# Patient Record
Sex: MALE | Race: WHITE | NOT HISPANIC OR LATINO | Employment: OTHER | URBAN - NONMETROPOLITAN AREA
[De-identification: names, ages, dates, MRNs, and addresses within clinical notes are randomized per-mention and may not be internally consistent; named-entity substitution may affect disease eponyms.]

---

## 2017-01-03 ENCOUNTER — HOSPITAL ENCOUNTER (OUTPATIENT)
Dept: SPEECH THERAPY | Facility: HOSPITAL | Age: 77
Setting detail: THERAPIES SERIES
Discharge: HOME | End: 2017-01-03

## 2017-01-03 DIAGNOSIS — R13.12 OROPHARYNGEAL DYSPHAGIA: Primary | ICD-10-CM

## 2017-01-03 PROCEDURE — 92526 ORAL FUNCTION THERAPY: CPT | Performed by: SPEECH-LANGUAGE PATHOLOGIST

## 2017-01-03 NOTE — PROGRESS NOTES
Outpatient Speech Language Pathology   Adult Swallow Treatment Note   [place]     Patient Name: Elliot Palma  : 1940  MRN: [id number]  Today's Date: 1/3/2017         Visit Date: 2017   Patient Active Problem List   Diagnosis   • Metastatic squamous cell carcinoma   • Cerebrovascular accident (CVA) due to thrombosis of left middle cerebral artery   • Atrial fibrillation   • Dyslipidemia   • Paroxysmal atrial flutter        Visit Dx:    ICD-10-CM ICD-9-CM   1. Oropharyngeal dysphagia R13.12 787.22                               SLP OP Goals       17 0859 16 0915 16 0918    Subjective Comments    Subjective Comments Patient reports that he has been doing his exercises. He reports improvement in his ability to use his duck call which involves manipulation of itra-oral pressure, precise tongue movements, and blowing strength and endurance.    -KG Patient was alert and able to participate in all therapy activities. He has been practing his goals at home.   -KG     Subjective Pain    Able to rate subjective pain? no  -KG no  -KG no  -KG    Dysphagia Goals    Dysphagia LTG's Patient will safely consume the recommended diet without complications such as aspiration pneumonia  -KG Patient will safely consume the recommended diet without complications such as aspiration pneumonia  -KG Patient will safely consume the recommended diet without complications such as aspiration pneumonia  -KG    Patient will safely consume the recommended diet without complications such as aspiration pneumonia Regular diet with nectar thick liquids  -KG Regular diet with nectar thick liquids  -KG Regular diet with nectar thick liquids  -KG    Status: Patient will safely consume the recommended diet without complications such as aspiration pneumonia Progressing as expected  -KG Progressing as expected  -KG Progressing as expected  -KG    Comments: Patient will safely consume the recommended diet without complications  such as aspiration pneumonia Patient reports no problems with regular diet. Less anterior loss today.   -KG Patient reports no problems with regular diet. Some anterior loss and drooling noted during therapy today.   -KG Per [facility] repeated, pt consuming regular diet with thin liquids. Reports no overt problems, does clear throat.   -KG    Dysphagia STG's Patient will improve oral skills to enhance safety and increase eating efficiency and bolus control through labial seal exercises;Patient will improve oral skills to enhance safety and increase eating efficiency by increasing accuracy of back of tongue control;Patient will increase laryngeal elevation to reduce residue that might fall into airway by completing;Patient will increase closure of larynx to keep food from falling into the airway by completing;Patient will increase strength of tongue base and posterior pharyngeal walls to reduce residue that might fall into airway by completing  -KG Patient will improve oral skills to enhance safety and increase eating efficiency and bolus control through labial seal exercises;Patient will improve oral skills to enhance safety and increase eating efficiency by increasing accuracy of back of tongue control;Patient will increase laryngeal elevation to reduce residue that might fall into airway by completing;Patient will increase closure of larynx to keep food from falling into the airway by completing;Patient will increase strength of tongue base and posterior pharyngeal walls to reduce residue that might fall into airway by completing  -KG Patient will improve oral skills to enhance safety and increase eating efficiency and bolus control through labial seal exercises;Patient will improve oral skills to enhance safety and increase eating efficiency by increasing accuracy of back of tongue control;Patient will increase laryngeal elevation to reduce residue that might fall into airway by completing;Patient will increase  closure of larynx to keep food from falling into the airway by completing;Patient will increase strength of tongue base and posterior pharyngeal walls to reduce residue that might fall into airway by completing  -KG    Patient will improve oral skills to enhance safety and increase eating efficiency and bolus control through labial seal exercises with cues  -KG with cues  -KG with cues  -KG    Status: Patient will improve oral skills to enhance safety and increase eating efficiency and bolus control through labial seal exercises Progressing as expected  -KG Progressing as expected  -KG Progressing as expected  -KG    Comments: Patient will improve oral skills to enhance safety and increase eating efficiency and bolus control through labial seal exercises Able to complete exercises with minimal cues.   -KG Able to complete exercises with instruction and cues.   -KG Able to complete exercises with instruction and cues.   -KG    Patient will improve oral skills to enhance safety and increase eating efficiency by increasing accuracy of back of tongue control with cues  -KG with cues  -KG with cues  -KG    Status: Patient will improve oral skills to enhance safety and increase eating efficiency by increasing accuracy of back of tongue control Progressing as expected  -KG Progressing as expected  -KG Progressing as expected  -KG    Comments: Patient will improve oral skills to enhance safety and increase eating efficiency by increasing accuracy of back of tongue control Able to complete exercises with minimal cues.   -KG Able to complete exercises with instruction and cues.   -KG Able to complete exercises with instruction and cues.   -KG    Patient will increase laryngeal elevation to reduce residue that might fall into airway by completing Mendelsohn maneuver;falsetto/pitch glide;with cues  -KG Mendelsohn maneuver;falsetto/pitch glide;with cues  -KG Mendelsohn maneuver;falsetto/pitch glide;with cues  -KG    Status:  Patient will increase laryngeal elevation to reduce residue that might fall into airway by completing Progressing as expected  -KG Progressing as expected  -KG Progressing as expected  -KG    Comments: Patient will increase laryngeal elevation to reduce residue that might fall into airway by completing Able to complete exercises with minimal cues.   -KG Able to complete exercises with instruction and cues.   -KG Able to complete exercises with instruction and cues.   -KG    Patient will increase closure of larynx to keep food from falling into the airway by completing super-supraglottic swallow;push-pull with breath hold;with cues  -KG super-supraglottic swallow;push-pull with breath hold;with cues  -KG super-supraglottic swallow;push-pull with breath hold;with cues  -KG    Status: Patient will increase closure of larynx to keep food from falling into the airway by completing Progressing as expected  -KG Progressing as expected  -KG Progressing as expected  -KG    Comments: Patient will increase closure of larynx to keep food from falling into the airway by completing able to complete with minimal cues.  -KG able to complete with instruction and cues.  -KG able to complete with instruction and cues.  -KG    Patient will increase strength of tongue base and posterior pharyngeal walls to reduce residue that might fall into airway by completing effotful swallow;Carly (tongue hold);with cues  -KG effotful swallow;Carly (tongue hold);with cues  -KG effotful swallow;Acrly (tongue hold);with cues  -KG    Status: Patient will increase strength of tongue base and posterior pharyngeal walls to reduce residue that might fall into airway by completing Progressing as expected  -KG Progressing as expected  -KG Progressing as expected  -KG    Comments: Patient will increase strength of tongue base and posterior pharyngeal walls to reduce residue that might fall into airway by completing Able to complete exercises with minimal  cues.   -KG Able to complete exercises with instruction and cues.   -KG Able to complete exercises with instruction and cues.   -KG    SLP Time Calculation    SLP Goal Re-Cert Due Date 01/06/17  -KG 01/06/17  -KG 01/06/17  -KG      User Key  (r) = Recorded By, (t) = Taken By, (c) = Cosigned By    Initials Name Provider Type    [facility]WOT [facility] Speech and Language Pathologist                OP SLP Education       01/03/17 0908          Education    Barriers to Learning Hearing deficit  -KG      Action Taken to Address Barriers Increased volume and repeated when necessary.   -KG      Education Provided Patient demonstrated recommended strategies  -KG      Assessed Learning needs;Learning motivation;Learning preferences;Learning readiness  -KG      Learning Motivation Strong  -KG      Learning Method Explanation;Demonstration  -KG      Teaching Response Verbalized understanding;Demonstrated understanding  -KG        User Key  (r) = Recorded By, (t) = Taken By, (c) = Cosigned By    Initials Name Effective Dates    KG HIWOT Jacome 08/02/16 -                 OP SLP Assessment/Plan - 01/03/17 0906     SLP Assessment    Clinical Impression Comments Patient performing exercieses with minimal cues. Swallow improving. Oral skills improving.   -KG    SLP Plan    Plan Comments Continue therapy  -KG      User Key  (r) = Recorded By, (t) = Taken By, (c) = Cosigned By    Initials Name Provider Type    CHAD Hurtado CCC-SLP Speech and Language Pathologist                Time Calculation:   SLP Start Time: 0845  SLP Stop Time: 0930  SLP Time Calculation (min): 45 min    Therapy Charges for Today     Code Description Service Date Service Provider Modifiers Qty    [id number] HC ST TREATMENT SWALLOW 3 1/3/2017 HIWOT Jacome GN 1                   HIWOT Mcneill  1/3/2017

## 2017-01-05 ENCOUNTER — HOSPITAL ENCOUNTER (OUTPATIENT)
Dept: SPEECH THERAPY | Facility: HOSPITAL | Age: 77
Setting detail: THERAPIES SERIES
Discharge: HOME | End: 2017-01-05

## 2017-01-05 DIAGNOSIS — R13.12 OROPHARYNGEAL DYSPHAGIA: Primary | ICD-10-CM

## 2017-01-05 PROCEDURE — G8996 SWALLOW CURRENT STATUS: HCPCS | Performed by: SPEECH-LANGUAGE PATHOLOGIST

## 2017-01-05 PROCEDURE — 92526 ORAL FUNCTION THERAPY: CPT | Performed by: SPEECH-LANGUAGE PATHOLOGIST

## 2017-01-05 PROCEDURE — G8997 SWALLOW GOAL STATUS: HCPCS | Performed by: SPEECH-LANGUAGE PATHOLOGIST

## 2017-01-05 NOTE — PROGRESS NOTES
Outpatient Speech Language Pathology   Adult Swallow Progress Note   [place]     Patient Name: Elliot Palma  : 1940  MRN: [id number]  Today's Date: 2017         Visit Date: 2017   Patient Active Problem List   Diagnosis   • Metastatic squamous cell carcinoma   • Cerebrovascular accident (CVA) due to thrombosis of left middle cerebral artery   • Atrial fibrillation   • Dyslipidemia   • Paroxysmal atrial flutter        Visit Dx:    ICD-10-CM ICD-9-CM   1. Oropharyngeal dysphagia R13.12 787.22                               SLP OP Goals       17 0856 17 0859 16 0915    Goal Type Needed    Goal Type Needed Other Adult Goals  -KG      Subjective Comments    Subjective Comments Patient alert and able to participate. Tolerated estim at level 15.5 through swallow exercises.   -KG Patient reports that he has been doing his exercises. He reports improvement in his ability to use his duck call which involves manipulation of itra-oral pressure, precise tongue movements, and blowing strength and endurance.    -KG Patient was alert and able to participate in all therapy activities. He has been practing his goals at home.   -KG    Subjective Pain    Able to rate subjective pain? no  -KG no  -KG no  -KG    Dysphagia Goals    Dysphagia LTG's Patient will safely consume the recommended diet without complications such as aspiration pneumonia  -KG Patient will safely consume the recommended diet without complications such as aspiration pneumonia  -KG Patient will safely consume the recommended diet without complications such as aspiration pneumonia  -KG    Patient will safely consume the recommended diet without complications such as aspiration pneumonia Regular diet with nectar thick liquids  -KG Regular diet with nectar thick liquids  -KG Regular diet with nectar thick liquids  -KG    Status: Patient will safely consume the recommended diet without complications such as aspiration pneumonia  Progressing as expected  -KG Progressing as expected  -KG Progressing as expected  -KG    Comments: Patient will safely consume the recommended diet without complications such as aspiration pneumonia Patient reports no problems with regular diet. Continues to clear throat with and without drinking thin water.   -KG Patient reports no problems with regular diet. Less anterior loss today.   -KG Patient reports no problems with regular diet. Some anterior loss and drooling noted during therapy today.   -KG    Dysphagia STG's Patient will improve oral skills to enhance safety and increase eating efficiency and bolus control through labial seal exercises;Patient will improve oral skills to enhance safety and increase eating efficiency by increasing accuracy of back of tongue control;Patient will increase laryngeal elevation to reduce residue that might fall into airway by completing;Patient will increase closure of larynx to keep food from falling into the airway by completing;Patient will increase strength of tongue base and posterior pharyngeal walls to reduce residue that might fall into airway by completing  -KG Patient will improve oral skills to enhance safety and increase eating efficiency and bolus control through labial seal exercises;Patient will improve oral skills to enhance safety and increase eating efficiency by increasing accuracy of back of tongue control;Patient will increase laryngeal elevation to reduce residue that might fall into airway by completing;Patient will increase closure of larynx to keep food from falling into the airway by completing;Patient will increase strength of tongue base and posterior pharyngeal walls to reduce residue that might fall into airway by completing  -KG Patient will improve oral skills to enhance safety and increase eating efficiency and bolus control through labial seal exercises;Patient will improve oral skills to enhance safety and increase eating efficiency by  increasing accuracy of back of tongue control;Patient will increase laryngeal elevation to reduce residue that might fall into airway by completing;Patient will increase closure of larynx to keep food from falling into the airway by completing;Patient will increase strength of tongue base and posterior pharyngeal walls to reduce residue that might fall into airway by completing  -KG    Patient will improve oral skills to enhance safety and increase eating efficiency and bolus control through labial seal exercises with cues;without cues  -KG with cues  -KG with cues  -KG    Status: Patient will improve oral skills to enhance safety and increase eating efficiency and bolus control through labial seal exercises Progressing as expected  -KG Progressing as expected  -KG Progressing as expected  -KG    Comments: Patient will improve oral skills to enhance safety and increase eating efficiency and bolus control through labial seal exercises Able to complete exercises with minimal cues.   -KG Able to complete exercises with minimal cues.   -KG Able to complete exercises with instruction and cues.   -KG    Patient will improve oral skills to enhance safety and increase eating efficiency by increasing accuracy of back of tongue control without cues  -KG with cues  -KG with cues  -KG    Status: Patient will improve oral skills to enhance safety and increase eating efficiency by increasing accuracy of back of tongue control Progressing as expected  -KG Progressing as expected  -KG Progressing as expected  -KG    Comments: Patient will improve oral skills to enhance safety and increase eating efficiency by increasing accuracy of back of tongue control Able to complete exercises with minimal cues.   -KG Able to complete exercises with minimal cues.   -KG Able to complete exercises with instruction and cues.   -KG    Patient will increase laryngeal elevation to reduce residue that might fall into airway by completing Mendelsohn  maneuver;falsetto/pitch glide;without cues  -KG Mendelsohn maneuver;falsetto/pitch glide;with cues  -KG Mendelsohn maneuver;falsetto/pitch glide;with cues  -KG    Status: Patient will increase laryngeal elevation to reduce residue that might fall into airway by completing Progressing as expected  -KG Progressing as expected  -KG Progressing as expected  -KG    Comments: Patient will increase laryngeal elevation to reduce residue that might fall into airway by completing Able to complete exercises with minimal cues.   -KG Able to complete exercises with minimal cues.   -KG Able to complete exercises with instruction and cues.   -KG    Patient will increase closure of larynx to keep food from falling into the airway by completing super-supraglottic swallow;push-pull with breath hold;without cues  -KG super-supraglottic swallow;push-pull with breath hold;with cues  -KG super-supraglottic swallow;push-pull with breath hold;with cues  -KG    Status: Patient will increase closure of larynx to keep food from falling into the airway by completing Progressing as expected  -KG Progressing as expected  -KG Progressing as expected  -KG    Comments: Patient will increase closure of larynx to keep food from falling into the airway by completing able to complete with minimal cues.  -KG able to complete with minimal cues.  -KG able to complete with instruction and cues.  -KG    Patient will increase strength of tongue base and posterior pharyngeal walls to reduce residue that might fall into airway by completing effotful swallow;Carly (tongue hold);without cues  -KG effotful swallow;Carly (tongue hold);with cues  -KG effotful swallow;Carly (tongue hold);with cues  -KG    Status: Patient will increase strength of tongue base and posterior pharyngeal walls to reduce residue that might fall into airway by completing Progressing as expected  -KG Progressing as expected  -KG Progressing as expected  -KG    Comments: Patient will  increase strength of tongue base and posterior pharyngeal walls to reduce residue that might fall into airway by completing Able to complete exercises with minimal cues.   -KG Able to complete exercises with minimal cues.   -KG Able to complete exercises with instruction and cues.   -KG    Other Goals    Other Adult Goal- 1 Patient will increase lip closure to reduce anterior loss and drooling by completing lip closure exercises.   -KG      Status: Other Adult Goal- 1 New  -KG      Other Adult Goal- 2 Patient will increase cheek strength for bolus formation by completing cheek exercises.   -KG      Status: Other Adult Goal- 2 New  -KG      SLP Time Calculation    SLP Goal Re-Cert Due Date 02/03/17  -KG 01/06/17  -KG 01/06/17  -KG      12/28/16 0918          Subjective Pain    Able to rate subjective pain? no  -KG      Dysphagia Goals    Dysphagia LTG's Patient will safely consume the recommended diet without complications such as aspiration pneumonia  -KG      Patient will safely consume the recommended diet without complications such as aspiration pneumonia Regular diet with nectar thick liquids  -KG      Status: Patient will safely consume the recommended diet without complications such as aspiration pneumonia Progressing as expected  -KG      Comments: Patient will safely consume the recommended diet without complications such as aspiration pneumonia Per [facility] repeated, pt consuming regular diet with thin liquids. Reports no overt problems, does clear throat.   -KG      Dysphagia STG's Patient will improve oral skills to enhance safety and increase eating efficiency and bolus control through labial seal exercises;Patient will improve oral skills to enhance safety and increase eating efficiency by increasing accuracy of back of tongue control;Patient will increase laryngeal elevation to reduce residue that might fall into airway by completing;Patient will increase closure of larynx to keep food from falling into  the airway by completing;Patient will increase strength of tongue base and posterior pharyngeal walls to reduce residue that might fall into airway by completing  -KG      Patient will improve oral skills to enhance safety and increase eating efficiency and bolus control through labial seal exercises with cues  -KG      Status: Patient will improve oral skills to enhance safety and increase eating efficiency and bolus control through labial seal exercises Progressing as expected  -KG      Comments: Patient will improve oral skills to enhance safety and increase eating efficiency and bolus control through labial seal exercises Able to complete exercises with instruction and cues.   -KG      Patient will improve oral skills to enhance safety and increase eating efficiency by increasing accuracy of back of tongue control with cues  -KG      Status: Patient will improve oral skills to enhance safety and increase eating efficiency by increasing accuracy of back of tongue control Progressing as expected  -KG      Comments: Patient will improve oral skills to enhance safety and increase eating efficiency by increasing accuracy of back of tongue control Able to complete exercises with instruction and cues.   -KG      Patient will increase laryngeal elevation to reduce residue that might fall into airway by completing Mendelsohn maneuver;falsetto/pitch glide;with cues  -KG      Status: Patient will increase laryngeal elevation to reduce residue that might fall into airway by completing Progressing as expected  -KG      Comments: Patient will increase laryngeal elevation to reduce residue that might fall into airway by completing Able to complete exercises with instruction and cues.   -KG      Patient will increase closure of larynx to keep food from falling into the airway by completing super-supraglottic swallow;push-pull with breath hold;with cues  -KG      Status: Patient will increase closure of larynx to keep food from  falling into the airway by completing Progressing as expected  -KG      Comments: Patient will increase closure of larynx to keep food from falling into the airway by completing able to complete with instruction and cues.  -KG      Patient will increase strength of tongue base and posterior pharyngeal walls to reduce residue that might fall into airway by completing effotful swallow;Carly (tongue hold);with cues  -KG      Status: Patient will increase strength of tongue base and posterior pharyngeal walls to reduce residue that might fall into airway by completing Progressing as expected  -KG      Comments: Patient will increase strength of tongue base and posterior pharyngeal walls to reduce residue that might fall into airway by completing Able to complete exercises with instruction and cues.   -KG      SLP Time Calculation    SLP Goal Re-Cert Due Date 01/06/17  -KG        User Key  (r) = Recorded By, (t) = Taken By, (c) = Cosigned By    Initials Name Provider Type    CHAD Hurtado CCC-SLP Speech and Language Pathologist                OP SLP Education       01/05/17 0919 01/03/17 0908       Education    Barriers to Learning Hearing deficit  -KG Hearing deficit  -KG     Action Taken to Address Barriers Paitent demonstrated understanding.   -KG Increased volume and repeated when necessary.   -KG     Education Provided Patient demonstrated recommended strategies  -KG Patient demonstrated recommended strategies  -KG     Assessed Learning needs;Learning motivation;Learning preferences;Learning readiness  -KG Learning needs;Learning motivation;Learning preferences;Learning readiness  -KG     Learning Motivation Strong  -KG Strong  -KG     Learning Method Explanation;Demonstration  -KG Explanation;Demonstration  -KG     Teaching Response Verbalized understanding;Demonstrated understanding  -KG Verbalized understanding;Demonstrated understanding  -KG       User Key  (r) = Recorded By, (t) = Taken By, (c) =  Cosigned By    Initials Name Effective Dates    KG HIWOT Jacome 08/02/16 -                 OP SLP Assessment/Plan - 01/05/17 0917     SLP Assessment    Functional Problems Swallowing  -KG    Clinical Impression: Swallowing Mild:;oropharyngeal phase dysphagia  -KG    Clinical Impression Comments Patient able to complete swallow exercises. Reduced lip/cheek strength remains.   -KG    SLP Diagnosis Oralpharyngeal dysphagia  -KG    Prognosis Good (comment)  -KG    Patient/caregiver participated in establishment of treatment plan and goals Yes  -KG    Patient would benefit from skilled therapy intervention Yes  -KG    SLP Plan    Frequency 2x/ week  -KG    Duration 4 weeks  -KG    Planned CPT's? SLP SWALLOW THERAPY: 69167  -KG    Expected Duration Therapy Session (min) 15-30 minutes  -KG    Plan Comments continue therapy  -KG      User Key  (r) = Recorded By, (t) = Taken By, (c) = Cosigned By    Initials Name Provider Type    KG HIWOT Jacome Speech and Language Pathologist                Time Calculation:   SLP Start Time: 0845  SLP Stop Time: 0928  SLP Time Calculation (min): 43 min    Therapy Charges for Today     Code Description Service Date Service Provider Modifiers Qty    30846534823 HC ST SWALLOWING CURRENT STATUS 1/5/2017 HIWOT Jacome GN, CI 1    [id number] HC ST SWALLOWING PROJECTED 1/5/2017 HIWOT Jacome,  1    [id number] HC ST TREATMENT SWALLOW 3 1/5/2017 HIWOT Jacome 1          SLP G-Codes  SLP NOMS Used?: Yes  Functional Limitations: Swallowing  Swallow Current Status (): At least 1 percent but less than 20 percent impaired, limited or restricted  Swallow Goal Status (): 0 percent impaired, limited or restricted        HIWOT Mcneill  1/5/2017

## 2017-01-10 ENCOUNTER — HOSPITAL ENCOUNTER (OUTPATIENT)
Dept: SPEECH THERAPY | Facility: HOSPITAL | Age: 77
Setting detail: THERAPIES SERIES
Discharge: HOME | End: 2017-01-10

## 2017-01-10 DIAGNOSIS — R13.12 OROPHARYNGEAL DYSPHAGIA: Primary | ICD-10-CM

## 2017-01-10 PROCEDURE — 92526 ORAL FUNCTION THERAPY: CPT | Performed by: SPEECH-LANGUAGE PATHOLOGIST

## 2017-01-10 NOTE — PROGRESS NOTES
Outpatient Speech Language Pathology   Adult Swallow Treatment Note   [place]     Patient Name: Elliot Palma  : 1940  MRN: [id number]  Today's Date: 1/10/2017         Visit Date: 01/10/2017   Patient Active Problem List   Diagnosis   • Metastatic squamous cell carcinoma   • Cerebrovascular accident (CVA) due to thrombosis of left middle cerebral artery   • Atrial fibrillation   • Dyslipidemia   • Paroxysmal atrial flutter        Visit Dx:    ICD-10-CM ICD-9-CM   1. Oropharyngeal dysphagia R13.12 787.22                               SLP OP Goals       01/10/17 0900 17 [id number]    Goal Type Needed    Goal Type Needed  Other Adult Goals  -KG     Subjective Comments    Subjective Comments Patient continues to complete swallowing exercises at home.   -KG Patient alert and able to participate. Tolerated estim at level 15.5 through swallow exercises.   -KG Patient reports that he has been doing his exercises. He reports improvement in his ability to use his duck call which involves manipulation of itra-oral pressure, precise tongue movements, and blowing strength and endurance.    -KG    Subjective Pain    Able to rate subjective pain? no  -KG no  -KG no  -KG    Dysphagia Goals    Dysphagia LTG's Patient will safely consume the recommended diet without complications such as aspiration pneumonia  -KG Patient will safely consume the recommended diet without complications such as aspiration pneumonia  -KG Patient will safely consume the recommended diet without complications such as aspiration pneumonia  -KG    Patient will safely consume the recommended diet without complications such as aspiration pneumonia Regular diet with nectar thick liquids  -KG Regular diet with nectar thick liquids  -KG Regular diet with nectar thick liquids  -KG    Status: Patient will safely consume the recommended diet without complications such as aspiration pneumonia Progressing as expected  -KG Progressing as expected   -KG Progressing as expected  -KG    Comments: Patient will safely consume the recommended diet without complications such as aspiration pneumonia Patient reports no problems with regular diet. Continues to clear throat.  -KG Patient reports no problems with regular diet. Continues to clear throat with and without drinking thin water.   -KG Patient reports no problems with regular diet. Less anterior loss today.   -KG    Dysphagia STG's Patient will improve oral skills to enhance safety and increase eating efficiency and bolus control through labial seal exercises;Patient will improve oral skills to enhance safety and increase eating efficiency by increasing accuracy of back of tongue control;Patient will increase laryngeal elevation to reduce residue that might fall into airway by completing;Patient will increase closure of larynx to keep food from falling into the airway by completing;Patient will increase strength of tongue base and posterior pharyngeal walls to reduce residue that might fall into airway by completing  -KG Patient will improve oral skills to enhance safety and increase eating efficiency and bolus control through labial seal exercises;Patient will improve oral skills to enhance safety and increase eating efficiency by increasing accuracy of back of tongue control;Patient will increase laryngeal elevation to reduce residue that might fall into airway by completing;Patient will increase closure of larynx to keep food from falling into the airway by completing;Patient will increase strength of tongue base and posterior pharyngeal walls to reduce residue that might fall into airway by completing  -KG Patient will improve oral skills to enhance safety and increase eating efficiency and bolus control through labial seal exercises;Patient will improve oral skills to enhance safety and increase eating efficiency by increasing accuracy of back of tongue control;Patient will increase laryngeal elevation to  reduce residue that might fall into airway by completing;Patient will increase closure of larynx to keep food from falling into the airway by completing;Patient will increase strength of tongue base and posterior pharyngeal walls to reduce residue that might fall into airway by completing  -KG    Patient will improve oral skills to enhance safety and increase eating efficiency and bolus control through labial seal exercises with cues;without cues  -KG with cues;without cues  -KG with cues  -KG    Status: Patient will improve oral skills to enhance safety and increase eating efficiency and bolus control through labial seal exercises Progressing as expected  -KG Progressing as expected  -KG Progressing as expected  -KG    Comments: Patient will improve oral skills to enhance safety and increase eating efficiency and bolus control through labial seal exercises Able to complete exercises with estim on level 13.   -KG Able to complete exercises with minimal cues.   -KG Able to complete exercises with minimal cues.   -KG    Patient will improve oral skills to enhance safety and increase eating efficiency by increasing accuracy of back of tongue control without cues  -KG without cues  -KG with cues  -KG    Status: Patient will improve oral skills to enhance safety and increase eating efficiency by increasing accuracy of back of tongue control Progressing as expected  -KG Progressing as expected  -KG Progressing as expected  -KG    Comments: Patient will improve oral skills to enhance safety and increase eating efficiency by increasing accuracy of back of tongue control Completing exercises at home.   -KG Able to complete exercises with minimal cues.   -KG Able to complete exercises with minimal cues.   -KG    Patient will increase laryngeal elevation to reduce residue that might fall into airway by completing Mendelsohn maneuver;falsetto/pitch glide;without cues  -KG Mendelsohn maneuver;falsetto/pitch glide;without cues   -KG Mendelsohn maneuver;falsetto/pitch glide;with cues  -KG    Status: Patient will increase laryngeal elevation to reduce residue that might fall into airway by completing Progressing as expected  -KG Progressing as expected  -KG Progressing as expected  -KG    Comments: Patient will increase laryngeal elevation to reduce residue that might fall into airway by completing completing swallow exercises at home.   -KG Able to complete exercises with minimal cues.   -KG Able to complete exercises with minimal cues.   -KG    Patient will increase closure of larynx to keep food from falling into the airway by completing super-supraglottic swallow;push-pull with breath hold;without cues  -KG super-supraglottic swallow;push-pull with breath hold;without cues  -KG super-supraglottic swallow;push-pull with breath hold;with cues  -KG    Status: Patient will increase closure of larynx to keep food from falling into the airway by completing Progressing as expected  -KG Progressing as expected  -KG Progressing as expected  -KG    Comments: Patient will increase closure of larynx to keep food from falling into the airway by completing completing swallow exercises at home.   -KG able to complete with minimal cues.  -KG able to complete with minimal cues.  -KG    Patient will increase strength of tongue base and posterior pharyngeal walls to reduce residue that might fall into airway by completing effotful swallow;Carly (tongue hold);without cues  -KG effotful swallow;Carly (tongue hold);without cues  -KG effotful swallow;Caryl (tongue hold);with cues  -KG    Status: Patient will increase strength of tongue base and posterior pharyngeal walls to reduce residue that might fall into airway by completing Progressing as expected  -KG Progressing as expected  -KG Progressing as expected  -KG    Comments: Patient will increase strength of tongue base and posterior pharyngeal walls to reduce residue that might fall into airway by  completing completing swallow exercises at home.   -KG Able to complete exercises with minimal cues.   -KG Able to complete exercises with minimal cues.   -KG    Other Goals    Other Adult Goal- 1 Patient will increase lip closure to reduce anterior loss and drooling by completing lip closure exercises.   -KG Patient will increase lip closure to reduce anterior loss and drooling by completing lip closure exercises.   -KG     Status: Other Adult Goal- 1 Progressing as expected  -KG New  -KG     Comments: Other Adult Goal- 1 Able to complete with estim on  -KG      Other Adult Goal- 2 Patient will increase cheek strength for bolus formation by completing cheek exercises.   -KG Patient will increase cheek strength for bolus formation by completing cheek exercises.   -KG     Status: Other Adult Goal- 2 Progressing as expected  -KG New  -KG     Comments: Other Adult Goal- 2 completing swallow exercises at home.   -KG      SLP Time Calculation    SLP Goal Re-Cert Due Date 02/03/17  -KG 02/03/17  -KG 01/06/17  -KG      12/29/16 0915 12/28/16 0918       Subjective Comments    Subjective Comments Patient was alert and able to participate in all therapy activities. He has been practing his goals at home.   -KG      Subjective Pain    Able to rate subjective pain? no  -KG no  -KG     Dysphagia Goals    Dysphagia LTG's Patient will safely consume the recommended diet without complications such as aspiration pneumonia  -KG Patient will safely consume the recommended diet without complications such as aspiration pneumonia  -KG     Patient will safely consume the recommended diet without complications such as aspiration pneumonia Regular diet with nectar thick liquids  -KG Regular diet with nectar thick liquids  -KG     Status: Patient will safely consume the recommended diet without complications such as aspiration pneumonia Progressing as expected  -KG Progressing as expected  -KG     Comments: Patient will safely consume the  recommended diet without complications such as aspiration pneumonia Patient reports no problems with regular diet. Some anterior loss and drooling noted during therapy today.   -KG Per [facility] repeated, pt consuming regular diet with thin liquids. Reports no overt problems, does clear throat.   -KG     Dysphagia STG's Patient will improve oral skills to enhance safety and increase eating efficiency and bolus control through labial seal exercises;Patient will improve oral skills to enhance safety and increase eating efficiency by increasing accuracy of back of tongue control;Patient will increase laryngeal elevation to reduce residue that might fall into airway by completing;Patient will increase closure of larynx to keep food from falling into the airway by completing;Patient will increase strength of tongue base and posterior pharyngeal walls to reduce residue that might fall into airway by completing  -KG Patient will improve oral skills to enhance safety and increase eating efficiency and bolus control through labial seal exercises;Patient will improve oral skills to enhance safety and increase eating efficiency by increasing accuracy of back of tongue control;Patient will increase laryngeal elevation to reduce residue that might fall into airway by completing;Patient will increase closure of larynx to keep food from falling into the airway by completing;Patient will increase strength of tongue base and posterior pharyngeal walls to reduce residue that might fall into airway by completing  -KG     Patient will improve oral skills to enhance safety and increase eating efficiency and bolus control through labial seal exercises with cues  -KG with cues  -KG     Status: Patient will improve oral skills to enhance safety and increase eating efficiency and bolus control through labial seal exercises Progressing as expected  -KG Progressing as expected  -KG     Comments: Patient will improve oral skills to enhance safety  and increase eating efficiency and bolus control through labial seal exercises Able to complete exercises with instruction and cues.   -KG Able to complete exercises with instruction and cues.   -KG     Patient will improve oral skills to enhance safety and increase eating efficiency by increasing accuracy of back of tongue control with cues  -KG with cues  -KG     Status: Patient will improve oral skills to enhance safety and increase eating efficiency by increasing accuracy of back of tongue control Progressing as expected  -KG Progressing as expected  -KG     Comments: Patient will improve oral skills to enhance safety and increase eating efficiency by increasing accuracy of back of tongue control Able to complete exercises with instruction and cues.   -KG Able to complete exercises with instruction and cues.   -KG     Patient will increase laryngeal elevation to reduce residue that might fall into airway by completing Mendelsohn maneuver;falsetto/pitch glide;with cues  -KG Mendelsohn maneuver;falsetto/pitch glide;with cues  -KG     Status: Patient will increase laryngeal elevation to reduce residue that might fall into airway by completing Progressing as expected  -KG Progressing as expected  -KG     Comments: Patient will increase laryngeal elevation to reduce residue that might fall into airway by completing Able to complete exercises with instruction and cues.   -KG Able to complete exercises with instruction and cues.   -KG     Patient will increase closure of larynx to keep food from falling into the airway by completing super-supraglottic swallow;push-pull with breath hold;with cues  -KG super-supraglottic swallow;push-pull with breath hold;with cues  -KG     Status: Patient will increase closure of larynx to keep food from falling into the airway by completing Progressing as expected  -KG Progressing as expected  -KG     Comments: Patient will increase closure of larynx to keep food from falling into the  airway by completing able to complete with instruction and cues.  -KG able to complete with instruction and cues.  -KG     Patient will increase strength of tongue base and posterior pharyngeal walls to reduce residue that might fall into airway by completing effotful swallow;Carly (tongue hold);with cues  -KG effotful swallow;Carly (tongue hold);with cues  -KG     Status: Patient will increase strength of tongue base and posterior pharyngeal walls to reduce residue that might fall into airway by completing Progressing as expected  -KG Progressing as expected  -KG     Comments: Patient will increase strength of tongue base and posterior pharyngeal walls to reduce residue that might fall into airway by completing Able to complete exercises with instruction and cues.   -KG Able to complete exercises with instruction and cues.   -KG     SLP Time Calculation    SLP Goal Re-Cert Due Date 01/06/17  -KG 01/06/17  -KG       User Key  (r) = Recorded By, (t) = Taken By, (c) = Cosigned By    Initials Name Provider Type    KG Waleska Hurtado CCC-SLP Speech and Language Pathologist                OP SLP Education       01/10/17 0919          Education    Barriers to Learning Hearing deficit  -KG      Action Taken to Address Barriers Pt demonstrated understanding  -KG      Education Provided Patient demonstrated recommended strategies  -KG      Assessed Learning needs;Learning motivation;Learning preferences;Learning readiness  -KG      Learning Motivation Strong  -KG      Learning Method Explanation;Demonstration  -KG      Teaching Response Verbalized understanding;Demonstrated understanding  -KG        User Key  (r) = Recorded By, (t) = Taken By, (c) = Cosigned By    Initials Name Effective Dates    KG Waleska Hurtado CCC-SLP 08/02/16 -                 OP SLP Assessment/Plan - 01/10/17 0918     SLP Assessment    Clinical Impression Comments Continuing to progress.   -KG    SLP Plan    Plan Comments continue therapy  -KG       User Key  (r) = Recorded By, (t) = Taken By, (c) = Cosigned By    Initials Name Provider Type    KG Waleska Hurtado CCC-SLP Speech and Language Pathologist                Time Calculation:   SLP Start Time: 0850  SLP Stop Time: 0930  SLP Time Calculation (min): 40 min    Therapy Charges for Today     Code Description Service Date Service Provider Modifiers Qty    [id number] HC ST TREATMENT SWALLOW 3 1/10/2017 Waleska Hurtado CCC-SLP GN 1                   HIWOT Mcneill  1/10/2017

## 2017-01-12 ENCOUNTER — HOSPITAL ENCOUNTER (OUTPATIENT)
Dept: SPEECH THERAPY | Facility: HOSPITAL | Age: 77
Setting detail: THERAPIES SERIES
Discharge: HOME | End: 2017-01-12

## 2017-01-12 DIAGNOSIS — R13.12 OROPHARYNGEAL DYSPHAGIA: Primary | ICD-10-CM

## 2017-01-12 PROCEDURE — 92526 ORAL FUNCTION THERAPY: CPT | Performed by: SPEECH-LANGUAGE PATHOLOGIST

## 2017-01-12 NOTE — PROGRESS NOTES
Outpatient Speech Language Pathology   Adult Swallow Treatment Note   [facility]     Patient Name: Elliot Palma  : 1940  MRN: [id number]  Today's Date: 2017         Visit Date: 2017   Patient Active Problem List   Diagnosis   • Metastatic squamous cell carcinoma   • Cerebrovascular accident (CVA) due to thrombosis of left middle cerebral artery   • Atrial fibrillation   • Dyslipidemia   • Paroxysmal atrial flutter        Visit Dx:    ICD-10-CM ICD-9-CM   1. Oropharyngeal dysphagia R13.12 787.22                               SLP OP Goals       17 0900 01/10/17 0900 17 0856    Goal Type Needed    Goal Type Needed   Other Adult Goals  -KG    Subjective Comments    Subjective Comments Patient was alert and able to complete all exercies. He reported no problems swallowing and is with completing swallow exercises at home.  -KG Patient continues to complete swallowing exercises at home.   -KG Patient alert and able to participate. Tolerated estim at level 15.5 through swallow exercises.   -KG    Subjective Pain    Able to rate subjective pain? no  -KG no  -KG no  -KG    Dysphagia Goals    Dysphagia LTG's Patient will safely consume the recommended diet without complications such as aspiration pneumonia  -KG Patient will safely consume the recommended diet without complications such as aspiration pneumonia  -KG Patient will safely consume the recommended diet without complications such as aspiration pneumonia  -KG    Patient will safely consume the recommended diet without complications such as aspiration pneumonia Regular diet with nectar thick liquids  -KG Regular diet with nectar thick liquids  -KG Regular diet with nectar thick liquids  -KG    Status: Patient will safely consume the recommended diet without complications such as aspiration pneumonia Progressing as expected  -KG Progressing as expected  -KG Progressing as expected  -KG    Comments: Patient will safely consume the recommended  diet without complications such as aspiration pneumonia Patient reports no problems with regular diet. Continues to clear throat.  -KG Patient reports no problems with regular diet. Continues to clear throat.  -KG Patient reports no problems with regular diet. Continues to clear throat with and without drinking thin water.   -KG    Dysphagia STG's Patient will improve oral skills to enhance safety and increase eating efficiency and bolus control through labial seal exercises;Patient will improve oral skills to enhance safety and increase eating efficiency by increasing accuracy of back of tongue control;Patient will increase laryngeal elevation to reduce residue that might fall into airway by completing;Patient will increase closure of larynx to keep food from falling into the airway by completing;Patient will increase strength of tongue base and posterior pharyngeal walls to reduce residue that might fall into airway by completing  -KG Patient will improve oral skills to enhance safety and increase eating efficiency and bolus control through labial seal exercises;Patient will improve oral skills to enhance safety and increase eating efficiency by increasing accuracy of back of tongue control;Patient will increase laryngeal elevation to reduce residue that might fall into airway by completing;Patient will increase closure of larynx to keep food from falling into the airway by completing;Patient will increase strength of tongue base and posterior pharyngeal walls to reduce residue that might fall into airway by completing  -KG Patient will improve oral skills to enhance safety and increase eating efficiency and bolus control through labial seal exercises;Patient will improve oral skills to enhance safety and increase eating efficiency by increasing accuracy of back of tongue control;Patient will increase laryngeal elevation to reduce residue that might fall into airway by completing;Patient will increase closure of  larynx to keep food from falling into the airway by completing;Patient will increase strength of tongue base and posterior pharyngeal walls to reduce residue that might fall into airway by completing  -KG    Patient will improve oral skills to enhance safety and increase eating efficiency and bolus control through labial seal exercises with cues;without cues  -KG with cues;without cues  -KG with cues;without cues  -KG    Status: Patient will improve oral skills to enhance safety and increase eating efficiency and bolus control through labial seal exercises Progressing as expected  -KG Progressing as expected  -KG Progressing as expected  -KG    Comments: Patient will improve oral skills to enhance safety and increase eating efficiency and bolus control through labial seal exercises Able to complete exercises with estim on level 16  -KG Able to complete exercises with estim on level 13.   -KG Able to complete exercises with minimal cues.   -KG    Patient will improve oral skills to enhance safety and increase eating efficiency by increasing accuracy of back of tongue control without cues  -KG without cues  -KG without cues  -KG    Status: Patient will improve oral skills to enhance safety and increase eating efficiency by increasing accuracy of back of tongue control Progressing as expected  -KG Progressing as expected  -KG Progressing as expected  -KG    Comments: Patient will improve oral skills to enhance safety and increase eating efficiency by increasing accuracy of back of tongue control Completing exercises at home.   -KG Completing exercises at home.   -KG Able to complete exercises with minimal cues.   -KG    Patient will increase laryngeal elevation to reduce residue that might fall into airway by completing Mendelsohn maneuver;falsetto/pitch glide;without cues  -KG Mendelsohn maneuver;falsetto/pitch glide;without cues  -KG Mendelsohn maneuver;falsetto/pitch glide;without cues  -KG    Status: Patient will  increase laryngeal elevation to reduce residue that might fall into airway by completing Progressing as expected  -KG Progressing as expected  -KG Progressing as expected  -KG    Comments: Patient will increase laryngeal elevation to reduce residue that might fall into airway by completing completing swallow exercises at home.   -KG completing swallow exercises at home.   -KG Able to complete exercises with minimal cues.   -KG    Patient will increase closure of larynx to keep food from falling into the airway by completing super-supraglottic swallow;push-pull with breath hold;without cues  -KG super-supraglottic swallow;push-pull with breath hold;without cues  -KG super-supraglottic swallow;push-pull with breath hold;without cues  -KG    Status: Patient will increase closure of larynx to keep food from falling into the airway by completing Progressing as expected  -KG Progressing as expected  -KG Progressing as expected  -KG    Comments: Patient will increase closure of larynx to keep food from falling into the airway by completing completing swallow exercises at home.   -KG completing swallow exercises at home.   -KG able to complete with minimal cues.  -KG    Patient will increase strength of tongue base and posterior pharyngeal walls to reduce residue that might fall into airway by completing effotful swallow;Carly (tongue hold);without cues  -KG effotful swallow;Carly (tongue hold);without cues  -KG effotful swallow;Carly (tongue hold);without cues  -KG    Status: Patient will increase strength of tongue base and posterior pharyngeal walls to reduce residue that might fall into airway by completing Progressing as expected  -KG Progressing as expected  -KG Progressing as expected  -KG    Comments: Patient will increase strength of tongue base and posterior pharyngeal walls to reduce residue that might fall into airway by completing completing swallow exercises at home.   -KG completing swallow exercises at  home.   -KG Able to complete exercises with minimal cues.   -KG    Other Goals    Other Adult Goal- 1 Patient will increase lip closure to reduce anterior loss and drooling by completing lip closure exercises.   -KG Patient will increase lip closure to reduce anterior loss and drooling by completing lip closure exercises.   -KG Patient will increase lip closure to reduce anterior loss and drooling by completing lip closure exercises.   -KG    Status: Other Adult Goal- 1 Progressing as expected  -KG Progressing as expected  -KG New  -KG    Comments: Other Adult Goal- 1 Able to complete with estim on  -KG Able to complete with estim on  -KG     Other Adult Goal- 2 Patient will increase cheek strength for bolus formation by completing cheek exercises.   -KG Patient will increase cheek strength for bolus formation by completing cheek exercises.   -KG Patient will increase cheek strength for bolus formation by completing cheek exercises.   -KG    Status: Other Adult Goal- 2 Progressing as expected  -KG Progressing as expected  -KG New  -KG    Comments: Other Adult Goal- 2 He is completing oral motor and swallow exercises at home.   -KG completing swallow exercises at home.   -KG     SLP Time Calculation    SLP Goal Re-Cert Due Date 02/03/17  -KG 02/03/17  -KG 02/03/17  -KG      01/03/17 1256          Subjective Comments    Subjective Comments Patient reports that he has been doing his exercises. He reports improvement in his ability to use his duck call which involves manipulation of itra-oral pressure, precise tongue movements, and blowing strength and endurance.    -KG      Subjective Pain    Able to rate subjective pain? no  -KG      Dysphagia Goals    Dysphagia LTG's Patient will safely consume the recommended diet without complications such as aspiration pneumonia  -KG      Patient will safely consume the recommended diet without complications such as aspiration pneumonia Regular diet with nectar thick liquids  -KG       Status: Patient will safely consume the recommended diet without complications such as aspiration pneumonia Progressing as expected  -KG      Comments: Patient will safely consume the recommended diet without complications such as aspiration pneumonia Patient reports no problems with regular diet. Less anterior loss today.   -KG      Dysphagia STG's Patient will improve oral skills to enhance safety and increase eating efficiency and bolus control through labial seal exercises;Patient will improve oral skills to enhance safety and increase eating efficiency by increasing accuracy of back of tongue control;Patient will increase laryngeal elevation to reduce residue that might fall into airway by completing;Patient will increase closure of larynx to keep food from falling into the airway by completing;Patient will increase strength of tongue base and posterior pharyngeal walls to reduce residue that might fall into airway by completing  -KG      Patient will improve oral skills to enhance safety and increase eating efficiency and bolus control through labial seal exercises with cues  -KG      Status: Patient will improve oral skills to enhance safety and increase eating efficiency and bolus control through labial seal exercises Progressing as expected  -KG      Comments: Patient will improve oral skills to enhance safety and increase eating efficiency and bolus control through labial seal exercises Able to complete exercises with minimal cues.   -KG      Patient will improve oral skills to enhance safety and increase eating efficiency by increasing accuracy of back of tongue control with cues  -KG      Status: Patient will improve oral skills to enhance safety and increase eating efficiency by increasing accuracy of back of tongue control Progressing as expected  -KG      Comments: Patient will improve oral skills to enhance safety and increase eating efficiency by increasing accuracy of back of tongue control  Able to complete exercises with minimal cues.   -KG      Patient will increase laryngeal elevation to reduce residue that might fall into airway by completing Mendelsohn maneuver;falsetto/pitch glide;with cues  -KG      Status: Patient will increase laryngeal elevation to reduce residue that might fall into airway by completing Progressing as expected  -KG      Comments: Patient will increase laryngeal elevation to reduce residue that might fall into airway by completing Able to complete exercises with minimal cues.   -KG      Patient will increase closure of larynx to keep food from falling into the airway by completing super-supraglottic swallow;push-pull with breath hold;with cues  -KG      Status: Patient will increase closure of larynx to keep food from falling into the airway by completing Progressing as expected  -KG      Comments: Patient will increase closure of larynx to keep food from falling into the airway by completing able to complete with minimal cues.  -KG      Patient will increase strength of tongue base and posterior pharyngeal walls to reduce residue that might fall into airway by completing effotful swallow;Carly (tongue hold);with cues  -KG      Status: Patient will increase strength of tongue base and posterior pharyngeal walls to reduce residue that might fall into airway by completing Progressing as expected  -KG      Comments: Patient will increase strength of tongue base and posterior pharyngeal walls to reduce residue that might fall into airway by completing Able to complete exercises with minimal cues.   -KG      SLP Time Calculation    SLP Goal Re-Cert Due Date 01/06/17  -KG        User Key  (r) = Recorded By, (t) = Taken By, (c) = Cosigned By    Initials Name Provider Type    KG Waleska Hurtado CCC-SLP Speech and Language Pathologist                OP SLP Education       01/12/17 1103 01/10/17 0919       Education    Barriers to Learning No barriers identified  -KG Hearing deficit   -KG     Action Taken to Address Barriers  Pt demonstrated understanding  -KG     Education Provided  Patient demonstrated recommended strategies  -KG     Assessed  Learning needs;Learning motivation;Learning preferences;Learning readiness  -KG     Learning Motivation  Strong  -KG     Learning Method  Explanation;Demonstration  -KG     Teaching Response  Verbalized understanding;Demonstrated understanding  -KG     Education Comments Patient demonstrated knowledge of goals and progress. Demonstrated knowledge of swallow exercises completed at home.   -KG        User Key  (r) = Recorded By, (t) = Taken By, (c) = Cosigned By    Initials Name Effective Dates    KG HIWOT Jacome 08/02/16 -                 OP SLP Assessment/Plan - 01/12/17 1103     SLP Assessment    Clinical Impression Comments Continuing to progress.   -KG    SLP Plan    Plan Comments Continue therapy  -KG      User Key  (r) = Recorded By, (t) = Taken By, (c) = Cosigned By    Initials Name Provider Type    CHAD Hurtado CCC-SLP Speech and Language Pathologist                Time Calculation:   SLP Start Time: 0845  SLP Stop Time: 0930  SLP Time Calculation (min): 45 min    Therapy Charges for Today     Code Description Service Date Service Provider Modifiers Qty    [id number] HC ST TREATMENT SWALLOW 3 1/12/2017 HIWOT Jacome GN 1                   HIWOT Mcneill  1/12/2017

## 2017-01-17 ENCOUNTER — HOSPITAL ENCOUNTER (OUTPATIENT)
Dept: SPEECH THERAPY | Facility: HOSPITAL | Age: 77
Setting detail: THERAPIES SERIES
Discharge: HOME | End: 2017-01-17

## 2017-01-17 DIAGNOSIS — R13.12 OROPHARYNGEAL DYSPHAGIA: Primary | ICD-10-CM

## 2017-01-17 PROCEDURE — 92526 ORAL FUNCTION THERAPY: CPT | Performed by: SPEECH-LANGUAGE PATHOLOGIST

## 2017-01-17 NOTE — PROGRESS NOTES
Outpatient Speech Language Pathology   Adult Swallow Treatment Note   Tutor Key     Patient Name: Elliot Palma  : 1940  MRN: [id number]  Today's Date: 2017         Visit Date: 2017   Patient Active Problem List   Diagnosis   • Metastatic squamous cell carcinoma   • Cerebrovascular accident (CVA) due to thrombosis of left middle cerebral artery   • Atrial fibrillation   • Dyslipidemia   • Paroxysmal atrial flutter        Visit Dx:    ICD-10-CM ICD-9-CM   1. Oropharyngeal dysphagia R13.12 787.22                               SLP OP Goals       1700 01/12/17 0900 01/10/17 0900    Subjective Comments    Subjective Comments Patient expressed no concerns. He was able to complete all exercises independently with estim on level 16.5  -KG Patient was alert and able to complete all exercies. He reported no problems swallowing and is with completing swallow exercises at home.  -KG Patient continues to complete swallowing exercises at home.   -KG    Subjective Pain    Able to rate subjective pain? no  -KG no  -KG no  -KG    Dysphagia Goals    Dysphagia LTG's Patient will safely consume the recommended diet without complications such as aspiration pneumonia  -KG Patient will safely consume the recommended diet without complications such as aspiration pneumonia  -KG Patient will safely consume the recommended diet without complications such as aspiration pneumonia  -KG    Patient will safely consume the recommended diet without complications such as aspiration pneumonia Regular diet with nectar thick liquids  -KG Regular diet with nectar thick liquids  -KG Regular diet with nectar thick liquids  -KG    Status: Patient will safely consume the recommended diet without complications such as aspiration pneumonia Progressing as expected  -KG Progressing as expected  -KG Progressing as expected  -KG    Comments: Patient will safely consume the recommended diet without complications such as aspiration  pneumonia Patient reports no problems with regular diet. Continues to clear throat, throat clearing has improved.   -KG Patient reports no problems with regular diet. Continues to clear throat.  -KG Patient reports no problems with regular diet. Continues to clear throat.  -KG    Dysphagia STG's Patient will improve oral skills to enhance safety and increase eating efficiency and bolus control through labial seal exercises;Patient will improve oral skills to enhance safety and increase eating efficiency by increasing accuracy of back of tongue control;Patient will increase laryngeal elevation to reduce residue that might fall into airway by completing;Patient will increase closure of larynx to keep food from falling into the airway by completing;Patient will increase strength of tongue base and posterior pharyngeal walls to reduce residue that might fall into airway by completing  -KG Patient will improve oral skills to enhance safety and increase eating efficiency and bolus control through labial seal exercises;Patient will improve oral skills to enhance safety and increase eating efficiency by increasing accuracy of back of tongue control;Patient will increase laryngeal elevation to reduce residue that might fall into airway by completing;Patient will increase closure of larynx to keep food from falling into the airway by completing;Patient will increase strength of tongue base and posterior pharyngeal walls to reduce residue that might fall into airway by completing  -KG Patient will improve oral skills to enhance safety and increase eating efficiency and bolus control through labial seal exercises;Patient will improve oral skills to enhance safety and increase eating efficiency by increasing accuracy of back of tongue control;Patient will increase laryngeal elevation to reduce residue that might fall into airway by completing;Patient will increase closure of larynx to keep food from falling into the airway by  completing;Patient will increase strength of tongue base and posterior pharyngeal walls to reduce residue that might fall into airway by completing  -KG    Patient will improve oral skills to enhance safety and increase eating efficiency and bolus control through labial seal exercises with cues;without cues  -KG with cues;without cues  -KG with cues;without cues  -KG    Status: Patient will improve oral skills to enhance safety and increase eating efficiency and bolus control through labial seal exercises Progressing as expected  -KG Progressing as expected  -KG Progressing as expected  -KG    Comments: Patient will improve oral skills to enhance safety and increase eating efficiency and bolus control through labial seal exercises Able to complete exercises with estim on level 16.5  -KG Able to complete exercises with estim on level 16  -KG Able to complete exercises with estim on level 13.   -KG    Patient will improve oral skills to enhance safety and increase eating efficiency by increasing accuracy of back of tongue control without cues  -KG without cues  -KG without cues  -KG    Status: Patient will improve oral skills to enhance safety and increase eating efficiency by increasing accuracy of back of tongue control Progressing as expected  -KG Progressing as expected  -KG Progressing as expected  -KG    Comments: Patient will improve oral skills to enhance safety and increase eating efficiency by increasing accuracy of back of tongue control Completing exercises at home.   -KG Completing exercises at home.   -KG Completing exercises at home.   -KG    Patient will increase laryngeal elevation to reduce residue that might fall into airway by completing Mendelsohn maneuver;falsetto/pitch glide;without cues  -KG Mendelsohn maneuver;falsetto/pitch glide;without cues  -KG Mendelsohn maneuver;falsetto/pitch glide;without cues  -KG    Status: Patient will increase laryngeal elevation to reduce residue that might fall  into airway by completing Progressing as expected  -KG Progressing as expected  -KG Progressing as expected  -KG    Comments: Patient will increase laryngeal elevation to reduce residue that might fall into airway by completing completing swallow exercises at home.   -KG completing swallow exercises at home.   -KG completing swallow exercises at home.   -KG    Patient will increase closure of larynx to keep food from falling into the airway by completing super-supraglottic swallow;push-pull with breath hold;without cues  -KG super-supraglottic swallow;push-pull with breath hold;without cues  -KG super-supraglottic swallow;push-pull with breath hold;without cues  -KG    Status: Patient will increase closure of larynx to keep food from falling into the airway by completing Progressing as expected  -KG Progressing as expected  -KG Progressing as expected  -KG    Comments: Patient will increase closure of larynx to keep food from falling into the airway by completing completing swallow exercises at home.   -KG completing swallow exercises at home.   -KG completing swallow exercises at home.   -KG    Patient will increase strength of tongue base and posterior pharyngeal walls to reduce residue that might fall into airway by completing effotful swallow;Carly (tongue hold);without cues  -KG effotful swallow;Carly (tongue hold);without cues  -KG effotful swallow;Carly (tongue hold);without cues  -KG    Status: Patient will increase strength of tongue base and posterior pharyngeal walls to reduce residue that might fall into airway by completing Progressing as expected  -KG Progressing as expected  -KG Progressing as expected  -KG    Comments: Patient will increase strength of tongue base and posterior pharyngeal walls to reduce residue that might fall into airway by completing completing swallow exercises at home.   -KG completing swallow exercises at home.   -KG completing swallow exercises at home.   -KG    Other Goals     Other Adult Goal- 1 Patient will increase lip closure to reduce anterior loss and drooling by completing lip closure exercises.   -KG Patient will increase lip closure to reduce anterior loss and drooling by completing lip closure exercises.   -KG Patient will increase lip closure to reduce anterior loss and drooling by completing lip closure exercises.   -KG    Status: Other Adult Goal- 1 Progressing as expected  -KG Progressing as expected  -KG Progressing as expected  -KG    Comments: Other Adult Goal- 1 Able to complete with estim on  -KG Able to complete with estim on  -KG Able to complete with estim on  -KG    Other Adult Goal- 2 Patient will increase cheek strength for bolus formation by completing cheek exercises.   -KG Patient will increase cheek strength for bolus formation by completing cheek exercises.   -KG Patient will increase cheek strength for bolus formation by completing cheek exercises.   -KG    Status: Other Adult Goal- 2 Progressing as expected  -KG Progressing as expected  -KG Progressing as expected  -KG    Comments: Other Adult Goal- 2 He is completing oral motor and swallow exercises at home.   -KG He is completing oral motor and swallow exercises at home.   -KG completing swallow exercises at home.   -KG    SLP Time Calculation    SLP Goal Re-Cert Due Date 02/03/17  -KG 02/03/17  -KG 02/03/17  -KG      01/05/17 0856          Goal Type Needed    Goal Type Needed Other Adult Goals  -KG      Subjective Comments    Subjective Comments Patient alert and able to participate. Tolerated estim at level 15.5 through swallow exercises.   -KG      Subjective Pain    Able to rate subjective pain? no  -KG      Dysphagia Goals    Dysphagia LTG's Patient will safely consume the recommended diet without complications such as aspiration pneumonia  -KG      Patient will safely consume the recommended diet without complications such as aspiration pneumonia Regular diet with nectar thick liquids  -KG       Status: Patient will safely consume the recommended diet without complications such as aspiration pneumonia Progressing as expected  -KG      Comments: Patient will safely consume the recommended diet without complications such as aspiration pneumonia Patient reports no problems with regular diet. Continues to clear throat with and without drinking thin water.   -KG      Dysphagia STG's Patient will improve oral skills to enhance safety and increase eating efficiency and bolus control through labial seal exercises;Patient will improve oral skills to enhance safety and increase eating efficiency by increasing accuracy of back of tongue control;Patient will increase laryngeal elevation to reduce residue that might fall into airway by completing;Patient will increase closure of larynx to keep food from falling into the airway by completing;Patient will increase strength of tongue base and posterior pharyngeal walls to reduce residue that might fall into airway by completing  -KG      Patient will improve oral skills to enhance safety and increase eating efficiency and bolus control through labial seal exercises with cues;without cues  -KG      Status: Patient will improve oral skills to enhance safety and increase eating efficiency and bolus control through labial seal exercises Progressing as expected  -KG      Comments: Patient will improve oral skills to enhance safety and increase eating efficiency and bolus control through labial seal exercises Able to complete exercises with minimal cues.   -KG      Patient will improve oral skills to enhance safety and increase eating efficiency by increasing accuracy of back of tongue control without cues  -KG      Status: Patient will improve oral skills to enhance safety and increase eating efficiency by increasing accuracy of back of tongue control Progressing as expected  -KG      Comments: Patient will improve oral skills to enhance safety and increase eating efficiency by  increasing accuracy of back of tongue control Able to complete exercises with minimal cues.   -KG      Patient will increase laryngeal elevation to reduce residue that might fall into airway by completing Mendelsohn maneuver;falsetto/pitch glide;without cues  -KG      Status: Patient will increase laryngeal elevation to reduce residue that might fall into airway by completing Progressing as expected  -KG      Comments: Patient will increase laryngeal elevation to reduce residue that might fall into airway by completing Able to complete exercises with minimal cues.   -KG      Patient will increase closure of larynx to keep food from falling into the airway by completing super-supraglottic swallow;push-pull with breath hold;without cues  -KG      Status: Patient will increase closure of larynx to keep food from falling into the airway by completing Progressing as expected  -KG      Comments: Patient will increase closure of larynx to keep food from falling into the airway by completing able to complete with minimal cues.  -KG      Patient will increase strength of tongue base and posterior pharyngeal walls to reduce residue that might fall into airway by completing effotful swallow;Carly (tongue hold);without cues  -KG      Status: Patient will increase strength of tongue base and posterior pharyngeal walls to reduce residue that might fall into airway by completing Progressing as expected  -KG      Comments: Patient will increase strength of tongue base and posterior pharyngeal walls to reduce residue that might fall into airway by completing Able to complete exercises with minimal cues.   -KG      Other Goals    Other Adult Goal- 1 Patient will increase lip closure to reduce anterior loss and drooling by completing lip closure exercises.   -KG      Status: Other Adult Goal- 1 New  -KG      Other Adult Goal- 2 Patient will increase cheek strength for bolus formation by completing cheek exercises.   -KG      Status:  Other Adult Goal- 2 New  -KG      SLP Time Calculation    SLP Goal Re-Cert Due Date 02/03/17  -KG        User Key  (r) = Recorded By, (t) = Taken By, (c) = Cosigned By    Initials Name Provider Type    [facility] Lawrence Speech and Language Pathologist                OP SLP Education       01/17/17 0905          Education    Barriers to Learning No barriers identified  -KG      Education Provided Patient demonstrated recommended strategies  -KG      Assessed Learning needs;Learning motivation;Learning preferences;Learning readiness  -KG      Learning Motivation Strong  -KG      Learning Method Demonstration  -KG      Teaching Response Verbalized understanding;Demonstrated understanding  -KG      Education Comments Continue therapy  -KG        User Key  (r) = Recorded By, (t) = Taken By, (c) = Cosigned By    Initials Name Effective Dates    [facility]OT Lawrence 08/02/16 -                 OP SLP Assessment/Plan - 01/17/17 0904     SLP Assessment    Clinical Impression Comments Continuing to progress, Decrease in facial asymmetry.   -KG    SLP Plan    Plan Comments Continue therapy.  -KG      User Key  (r) = Recorded By, (t) = Taken By, (c) = Cosigned By    Initials Name Provider Type    [facility] Speech and Language Pathologist                Time Calculation:   SLP Start Time: 0845  SLP Stop Time: 0925  SLP Time Calculation (min): 40 min    Therapy Charges for Today     Code Description Service Date Service Provider Modifiers Qty    [id number] HC ST TREATMENT SWALLOW 3 1/17/2017 HI[facility]OT Jacome GN 1                   HIWOT Mcneill  1/17/2017

## 2017-01-19 ENCOUNTER — HOSPITAL ENCOUNTER (OUTPATIENT)
Dept: SPEECH THERAPY | Facility: HOSPITAL | Age: 77
Setting detail: THERAPIES SERIES
Discharge: HOME | End: 2017-01-19

## 2017-01-19 DIAGNOSIS — R13.12 OROPHARYNGEAL DYSPHAGIA: Primary | ICD-10-CM

## 2017-01-19 PROCEDURE — 92526 ORAL FUNCTION THERAPY: CPT | Performed by: SPEECH-LANGUAGE PATHOLOGIST

## 2017-01-19 NOTE — PROGRESS NOTES
Outpatient Speech Language Pathology   Adult Swallow Treatment Note   Colon     Patient Name: Elliot Palma  : 1940  MRN: [id number]  Today's Date: 2017         Visit Date: 2017   Patient Active Problem List   Diagnosis   • Metastatic squamous cell carcinoma   • Cerebrovascular accident (CVA) due to thrombosis of left middle cerebral artery   • Atrial fibrillation   • Dyslipidemia   • Paroxysmal atrial flutter        Visit Dx:    ICD-10-CM ICD-9-CM   1. Oropharyngeal dysphagia R13.12 787.22                               SLP OP Goals       17 0858 17 0900 17 09    Subjective Comments    Subjective Comments Patient expressed no concerns. He feels his cheek is getting stronger. No problems with diet.   -KG Patient expressed no concerns. He was able to complete all exercises independently with estim on level 16.5  -KG Patient was alert and able to complete all exercies. He reported no problems swallowing and is with completing swallow exercises at home.  -KG    Subjective Pain    Able to rate subjective pain?  no  -KG no  -KG    Dysphagia Goals    Dysphagia LTG's Patient will safely consume the recommended diet without complications such as aspiration pneumonia  -KG Patient will safely consume the recommended diet without complications such as aspiration pneumonia  -KG Patient will safely consume the recommended diet without complications such as aspiration pneumonia  -KG    Patient will safely consume the recommended diet without complications such as aspiration pneumonia Regular diet with nectar thick liquids  -KG Regular diet with nectar thick liquids  -KG Regular diet with nectar thick liquids  -KG    Status: Patient will safely consume the recommended diet without complications such as aspiration pneumonia Progressing as expected  -KG Progressing as expected  -KG Progressing as expected  -KG    Comments: Patient will safely consume the recommended diet without complications  such as aspiration pneumonia Patient reports no problems with regular diet. Throat clearing has improved.   -KG Patient reports no problems with regular diet. Continues to clear throat, throat clearing has improved.   -KG Patient reports no problems with regular diet. Continues to clear throat.  -KG    Dysphagia STG's Patient will improve oral skills to enhance safety and increase eating efficiency and bolus control through labial seal exercises;Patient will improve oral skills to enhance safety and increase eating efficiency by increasing accuracy of back of tongue control;Patient will increase laryngeal elevation to reduce residue that might fall into airway by completing;Patient will increase closure of larynx to keep food from falling into the airway by completing;Patient will increase strength of tongue base and posterior pharyngeal walls to reduce residue that might fall into airway by completing  -KG Patient will improve oral skills to enhance safety and increase eating efficiency and bolus control through labial seal exercises;Patient will improve oral skills to enhance safety and increase eating efficiency by increasing accuracy of back of tongue control;Patient will increase laryngeal elevation to reduce residue that might fall into airway by completing;Patient will increase closure of larynx to keep food from falling into the airway by completing;Patient will increase strength of tongue base and posterior pharyngeal walls to reduce residue that might fall into airway by completing  -KG Patient will improve oral skills to enhance safety and increase eating efficiency and bolus control through labial seal exercises;Patient will improve oral skills to enhance safety and increase eating efficiency by increasing accuracy of back of tongue control;Patient will increase laryngeal elevation to reduce residue that might fall into airway by completing;Patient will increase closure of larynx to keep food from  falling into the airway by completing;Patient will increase strength of tongue base and posterior pharyngeal walls to reduce residue that might fall into airway by completing  -KG    Patient will improve oral skills to enhance safety and increase eating efficiency and bolus control through labial seal exercises with cues;without cues  -KG with cues;without cues  -KG with cues;without cues  -KG    Status: Patient will improve oral skills to enhance safety and increase eating efficiency and bolus control through labial seal exercises Progressing as expected  -KG Progressing as expected  -KG Progressing as expected  -KG    Comments: Patient will improve oral skills to enhance safety and increase eating efficiency and bolus control through labial seal exercises Able to complete exercises with estim on level 16.5  -KG Able to complete exercises with estim on level 16.5  -KG Able to complete exercises with estim on level 16  -KG    Patient will improve oral skills to enhance safety and increase eating efficiency by increasing accuracy of back of tongue control without cues  -KG without cues  -KG without cues  -KG    Status: Patient will improve oral skills to enhance safety and increase eating efficiency by increasing accuracy of back of tongue control Progressing as expected  -KG Progressing as expected  -KG Progressing as expected  -KG    Comments: Patient will improve oral skills to enhance safety and increase eating efficiency by increasing accuracy of back of tongue control Completing exercises at home.   -KG Completing exercises at home.   -KG Completing exercises at home.   -KG    Patient will increase laryngeal elevation to reduce residue that might fall into airway by completing Mendelsohn maneuver;falsetto/pitch glide;without cues  -KG Mendelsohn maneuver;falsetto/pitch glide;without cues  -KG Mendelsohn maneuver;falsetto/pitch glide;without cues  -KG    Status: Patient will increase laryngeal elevation to  reduce residue that might fall into airway by completing Progressing as expected  -KG Progressing as expected  -KG Progressing as expected  -KG    Comments: Patient will increase laryngeal elevation to reduce residue that might fall into airway by completing completing swallow exercises at home.   -KG completing swallow exercises at home.   -KG completing swallow exercises at home.   -KG    Patient will increase closure of larynx to keep food from falling into the airway by completing super-supraglottic swallow;push-pull with breath hold;without cues  -KG super-supraglottic swallow;push-pull with breath hold;without cues  -KG super-supraglottic swallow;push-pull with breath hold;without cues  -KG    Status: Patient will increase closure of larynx to keep food from falling into the airway by completing Progressing as expected  -KG Progressing as expected  -KG Progressing as expected  -KG    Comments: Patient will increase closure of larynx to keep food from falling into the airway by completing completing swallow exercises at home.   -KG completing swallow exercises at home.   -KG completing swallow exercises at home.   -KG    Patient will increase strength of tongue base and posterior pharyngeal walls to reduce residue that might fall into airway by completing effotful swallow;Carly (tongue hold);without cues  -KG effotful swallow;Carly (tongue hold);without cues  -KG effotful swallow;Carly (tongue hold);without cues  -KG    Status: Patient will increase strength of tongue base and posterior pharyngeal walls to reduce residue that might fall into airway by completing Progressing as expected  -KG Progressing as expected  -KG Progressing as expected  -KG    Comments: Patient will increase strength of tongue base and posterior pharyngeal walls to reduce residue that might fall into airway by completing completing swallow exercises at home.   -KG completing swallow exercises at home.   -KG completing swallow  exercises at home.   -KG    Other Goals    Other Adult Goal- 1 Patient will increase lip closure to reduce anterior loss and drooling by completing lip closure exercises.   -KG Patient will increase lip closure to reduce anterior loss and drooling by completing lip closure exercises.   -KG Patient will increase lip closure to reduce anterior loss and drooling by completing lip closure exercises.   -KG    Status: Other Adult Goal- 1 Progressing as expected  -KG Progressing as expected  -KG Progressing as expected  -KG    Comments: Other Adult Goal- 1 Able to complete with estim on  -KG Able to complete with estim on  -KG Able to complete with estim on  -KG    Other Adult Goal- 2 Patient will increase cheek strength for bolus formation by completing cheek exercises.   -KG Patient will increase cheek strength for bolus formation by completing cheek exercises.   -KG Patient will increase cheek strength for bolus formation by completing cheek exercises.   -KG    Status: Other Adult Goal- 2 Progressing as expected  -KG Progressing as expected  -KG Progressing as expected  -KG    Comments: Other Adult Goal- 2 He is completing oral motor and swallow exercises at home.   -KG He is completing oral motor and swallow exercises at home.   -KG He is completing oral motor and swallow exercises at home.   -KG    SLP Time Calculation    SLP Goal Re-Cert Due Date 02/23/17  -KG 02/03/17  -KG 02/03/17  -KG      01/10/17 0900          Subjective Comments    Subjective Comments Patient continues to complete swallowing exercises at home.   -KG      Subjective Pain    Able to rate subjective pain? no  -KG      Dysphagia Goals    Dysphagia LTG's Patient will safely consume the recommended diet without complications such as aspiration pneumonia  -KG      Patient will safely consume the recommended diet without complications such as aspiration pneumonia Regular diet with nectar thick liquids  -KG      Status: Patient will safely consume the  recommended diet without complications such as aspiration pneumonia Progressing as expected  -KG      Comments: Patient will safely consume the recommended diet without complications such as aspiration pneumonia Patient reports no problems with regular diet. Continues to clear throat.  -KG      Dysphagia STG's Patient will improve oral skills to enhance safety and increase eating efficiency and bolus control through labial seal exercises;Patient will improve oral skills to enhance safety and increase eating efficiency by increasing accuracy of back of tongue control;Patient will increase laryngeal elevation to reduce residue that might fall into airway by completing;Patient will increase closure of larynx to keep food from falling into the airway by completing;Patient will increase strength of tongue base and posterior pharyngeal walls to reduce residue that might fall into airway by completing  -KG      Patient will improve oral skills to enhance safety and increase eating efficiency and bolus control through labial seal exercises with cues;without cues  -KG      Status: Patient will improve oral skills to enhance safety and increase eating efficiency and bolus control through labial seal exercises Progressing as expected  -KG      Comments: Patient will improve oral skills to enhance safety and increase eating efficiency and bolus control through labial seal exercises Able to complete exercises with estim on level 13.   -KG      Patient will improve oral skills to enhance safety and increase eating efficiency by increasing accuracy of back of tongue control without cues  -KG      Status: Patient will improve oral skills to enhance safety and increase eating efficiency by increasing accuracy of back of tongue control Progressing as expected  -KG      Comments: Patient will improve oral skills to enhance safety and increase eating efficiency by increasing accuracy of back of tongue control Completing exercises at  home.   -KG      Patient will increase laryngeal elevation to reduce residue that might fall into airway by completing Mendelsohn maneuver;falsetto/pitch glide;without cues  -KG      Status: Patient will increase laryngeal elevation to reduce residue that might fall into airway by completing Progressing as expected  -KG      Comments: Patient will increase laryngeal elevation to reduce residue that might fall into airway by completing completing swallow exercises at home.   -KG      Patient will increase closure of larynx to keep food from falling into the airway by completing super-supraglottic swallow;push-pull with breath hold;without cues  -KG      Status: Patient will increase closure of larynx to keep food from falling into the airway by completing Progressing as expected  -KG      Comments: Patient will increase closure of larynx to keep food from falling into the airway by completing completing swallow exercises at home.   -KG      Patient will increase strength of tongue base and posterior pharyngeal walls to reduce residue that might fall into airway by completing effotful swallow;Carly (tongue hold);without cues  -KG      Status: Patient will increase strength of tongue base and posterior pharyngeal walls to reduce residue that might fall into airway by completing Progressing as expected  -KG      Comments: Patient will increase strength of tongue base and posterior pharyngeal walls to reduce residue that might fall into airway by completing completing swallow exercises at home.   -KG      Other Goals    Other Adult Goal- 1 Patient will increase lip closure to reduce anterior loss and drooling by completing lip closure exercises.   -KG      Status: Other Adult Goal- 1 Progressing as expected  -KG      Comments: Other Adult Goal- 1 Able to complete with estim on  -KG      Other Adult Goal- 2 Patient will increase cheek strength for bolus formation by completing cheek exercises.   -KG      Status: Other  Adult Goal- 2 Progressing as expected  -KG      Comments: Other Adult Goal- 2 completing swallow exercises at home.   -KG      SLP Time Calculation    SLP Goal Re-Cert Due Date 02/03/17  -KG        User Key  (r) = Recorded By, (t) = Taken By, (c) = Cosigned By    Initials Name Provider Type    [facility] Speech and Language Pathologist                OP SLP Education       01/19/17 0900 01/17/17 0905       Education    Barriers to Learning  No barriers identified  -KG     Education Provided  Patient demonstrated recommended strategies  -KG     Assessed  Learning needs;Learning motivation;Learning preferences;Learning readiness  -KG     Learning Motivation  Strong  -KG     Learning Method  Demonstration  -KG     Teaching Response  Verbalized understanding;Demonstrated understanding  -KG     Education Comments Patient expressed understanding of goals and progress.   -KG Continue therapy  -KG       User Key  (r) = Recorded By, (t) = Taken By, (c) = Cosigned By    Initials Name Effective Dates    KG [facility] Jacome 08/02/16 -                 OP SLP Assessment/Plan - 01/19/17 0900     SLP Assessment    Clinical Impression Comments Continuing to progress. Cheek is stronger.   -KG    SLP Plan    Plan Comments Continue therapy.   -KG      User Key  (r) = Recorded By, (t) = Taken By, (c) = Cosigned By    Initials Name Provider Type    [facility] Speech and Language Pathologist                Time Calculation:   SLP Start Time: 0845  SLP Stop Time: 0925  SLP Time Calculation (min): 40 min    Therapy Charges for Today     Code Description Service Date Service Provider Modifiers Qty    [id number] HC ST TREATMENT SWALLOW 3 1/19/2017 HI[facility]OT Jacome GN 1                   HIWOT Mcneill  1/19/2017

## 2017-01-24 ENCOUNTER — HOSPITAL ENCOUNTER (OUTPATIENT)
Dept: SPEECH THERAPY | Facility: HOSPITAL | Age: 77
Setting detail: THERAPIES SERIES
Discharge: HOME | End: 2017-01-24

## 2017-01-24 DIAGNOSIS — R13.12 OROPHARYNGEAL DYSPHAGIA: Primary | ICD-10-CM

## 2017-01-24 PROCEDURE — 92526 ORAL FUNCTION THERAPY: CPT | Performed by: SPEECH-LANGUAGE PATHOLOGIST

## 2017-01-24 NOTE — PROGRESS NOTES
Outpatient Speech Language Pathology   Adult Swallow Treatment Note   [place]     Patient Name: Elliot Palma  : 1940  MRN: [id number]  Today's Date: 2017         Visit Date: 2017   Patient Active Problem List   Diagnosis   • Metastatic squamous cell carcinoma   • Cerebrovascular accident (CVA) due to thrombosis of left middle cerebral artery   • Atrial fibrillation   • Dyslipidemia   • Paroxysmal atrial flutter        Visit Dx:    ICD-10-CM ICD-9-CM   1. Oropharyngeal dysphagia R13.12 787.22                               SLP OP Goals       17 0900 17 [id number] 17    Subjective Comments    Subjective Comments Patient was alert, he stated that he feels he is improving.   -KG Patient expressed no concerns. He feels his cheek is getting stronger. No problems with diet.   -KG Patient expressed no concerns. He was able to complete all exercises independently with estim on level 16.5  -KG    Subjective Pain    Able to rate subjective pain? no  -KG  no  -KG    Dysphagia Goals    Dysphagia LTG's Patient will safely consume the recommended diet without complications such as aspiration pneumonia  -KG Patient will safely consume the recommended diet without complications such as aspiration pneumonia  -KG Patient will safely consume the recommended diet without complications such as aspiration pneumonia  -KG    Patient will safely consume the recommended diet without complications such as aspiration pneumonia Regular diet with nectar thick liquids  -KG Regular diet with nectar thick liquids  -KG Regular diet with nectar thick liquids  -KG    Status: Patient will safely consume the recommended diet without complications such as aspiration pneumonia Progressing as expected  -KG Progressing as expected  -KG Progressing as expected  -KG    Comments: Patient will safely consume the recommended diet without complications such as aspiration pneumonia Patient reports no problems with regular diet.  Throat clearing has improved.   -KG Patient reports no problems with regular diet. Throat clearing has improved.   -KG Patient reports no problems with regular diet. Continues to clear throat, throat clearing has improved.   -KG    Dysphagia STG's Patient will improve oral skills to enhance safety and increase eating efficiency and bolus control through labial seal exercises;Patient will improve oral skills to enhance safety and increase eating efficiency by increasing accuracy of back of tongue control;Patient will increase laryngeal elevation to reduce residue that might fall into airway by completing;Patient will increase closure of larynx to keep food from falling into the airway by completing;Patient will increase strength of tongue base and posterior pharyngeal walls to reduce residue that might fall into airway by completing  -KG Patient will improve oral skills to enhance safety and increase eating efficiency and bolus control through labial seal exercises;Patient will improve oral skills to enhance safety and increase eating efficiency by increasing accuracy of back of tongue control;Patient will increase laryngeal elevation to reduce residue that might fall into airway by completing;Patient will increase closure of larynx to keep food from falling into the airway by completing;Patient will increase strength of tongue base and posterior pharyngeal walls to reduce residue that might fall into airway by completing  -KG Patient will improve oral skills to enhance safety and increase eating efficiency and bolus control through labial seal exercises;Patient will improve oral skills to enhance safety and increase eating efficiency by increasing accuracy of back of tongue control;Patient will increase laryngeal elevation to reduce residue that might fall into airway by completing;Patient will increase closure of larynx to keep food from falling into the airway by completing;Patient will increase strength of tongue  base and posterior pharyngeal walls to reduce residue that might fall into airway by completing  -KG    Patient will improve oral skills to enhance safety and increase eating efficiency and bolus control through labial seal exercises with cues;without cues  -KG with cues;without cues  -KG with cues;without cues  -KG    Status: Patient will improve oral skills to enhance safety and increase eating efficiency and bolus control through labial seal exercises Progressing as expected  -KG Progressing as expected  -KG Progressing as expected  -KG    Comments: Patient will improve oral skills to enhance safety and increase eating efficiency and bolus control through labial seal exercises Able to complete exercises with estim on level 16.5  -KG Able to complete exercises with estim on level 16.5  -KG Able to complete exercises with estim on level 16.5  -KG    Patient will improve oral skills to enhance safety and increase eating efficiency by increasing accuracy of back of tongue control without cues  -KG without cues  -KG without cues  -KG    Status: Patient will improve oral skills to enhance safety and increase eating efficiency by increasing accuracy of back of tongue control Progressing as expected  -KG Progressing as expected  -KG Progressing as expected  -KG    Comments: Patient will improve oral skills to enhance safety and increase eating efficiency by increasing accuracy of back of tongue control Completing exercises at home.   -KG Completing exercises at home.   -KG Completing exercises at home.   -KG    Patient will increase laryngeal elevation to reduce residue that might fall into airway by completing Mendelsohn maneuver;falsetto/pitch glide;without cues  -KG Mendelsohn maneuver;falsetto/pitch glide;without cues  -KG Mendelsohn maneuver;falsetto/pitch glide;without cues  -KG    Status: Patient will increase laryngeal elevation to reduce residue that might fall into airway by completing Progressing as expected   -KG Progressing as expected  -KG Progressing as expected  -KG    Comments: Patient will increase laryngeal elevation to reduce residue that might fall into airway by completing completing swallow exercises at home.   -KG completing swallow exercises at home.   -KG completing swallow exercises at home.   -KG    Patient will increase closure of larynx to keep food from falling into the airway by completing super-supraglottic swallow;push-pull with breath hold;without cues  -KG super-supraglottic swallow;push-pull with breath hold;without cues  -KG super-supraglottic swallow;push-pull with breath hold;without cues  -KG    Status: Patient will increase closure of larynx to keep food from falling into the airway by completing Progressing as expected  -KG Progressing as expected  -KG Progressing as expected  -KG    Comments: Patient will increase closure of larynx to keep food from falling into the airway by completing completing swallow exercises at home.   -KG completing swallow exercises at home.   -KG completing swallow exercises at home.   -KG    Patient will increase strength of tongue base and posterior pharyngeal walls to reduce residue that might fall into airway by completing effotful swallow;Carly (tongue hold);without cues  -KG effotful swallow;Carly (tongue hold);without cues  -KG effotful swallow;Carly (tongue hold);without cues  -KG    Status: Patient will increase strength of tongue base and posterior pharyngeal walls to reduce residue that might fall into airway by completing Progressing as expected  -KG Progressing as expected  -KG Progressing as expected  -KG    Comments: Patient will increase strength of tongue base and posterior pharyngeal walls to reduce residue that might fall into airway by completing completing swallow exercises at home.   -KG completing swallow exercises at home.   -KG completing swallow exercises at home.   -KG    Other Goals    Other Adult Goal- 1 Patient will increase lip  closure to reduce anterior loss and drooling by completing lip closure exercises.   -KG Patient will increase lip closure to reduce anterior loss and drooling by completing lip closure exercises.   -KG Patient will increase lip closure to reduce anterior loss and drooling by completing lip closure exercises.   -KG    Status: Other Adult Goal- 1 Progressing as expected  -KG Progressing as expected  -KG Progressing as expected  -KG    Comments: Other Adult Goal- 1 Able to complete with estim on. Facial symmetry is improved. Drooling has decreased.   -KG Able to complete with estim on  -KG Able to complete with estim on  -KG    Other Adult Goal- 2 Patient will increase cheek strength for bolus formation by completing cheek exercises.   -KG Patient will increase cheek strength for bolus formation by completing cheek exercises.   -KG Patient will increase cheek strength for bolus formation by completing cheek exercises.   -KG    Status: Other Adult Goal- 2 Progressing as expected  -KG Progressing as expected  -KG Progressing as expected  -KG    Comments: Other Adult Goal- 2 He is completing oral motor and swallow exercises at home. Facial symmetry is improved. No reports of chokeing.   -KG He is completing oral motor and swallow exercises at home.   -KG He is completing oral motor and swallow exercises at home.   -KG    SLP Time Calculation    SLP Goal Re-Cert Due Date 02/23/17  -KG 02/23/17  -KG 02/03/17  -KG      01/12/17 0900          Subjective Comments    Subjective Comments Patient was alert and able to complete all exercies. He reported no problems swallowing and is with completing swallow exercises at home.  -KG      Subjective Pain    Able to rate subjective pain? no  -KG      Dysphagia Goals    Dysphagia LTG's Patient will safely consume the recommended diet without complications such as aspiration pneumonia  -KG      Patient will safely consume the recommended diet without complications such as aspiration  pneumonia Regular diet with nectar thick liquids  -KG      Status: Patient will safely consume the recommended diet without complications such as aspiration pneumonia Progressing as expected  -KG      Comments: Patient will safely consume the recommended diet without complications such as aspiration pneumonia Patient reports no problems with regular diet. Continues to clear throat.  -KG      Dysphagia STG's Patient will improve oral skills to enhance safety and increase eating efficiency and bolus control through labial seal exercises;Patient will improve oral skills to enhance safety and increase eating efficiency by increasing accuracy of back of tongue control;Patient will increase laryngeal elevation to reduce residue that might fall into airway by completing;Patient will increase closure of larynx to keep food from falling into the airway by completing;Patient will increase strength of tongue base and posterior pharyngeal walls to reduce residue that might fall into airway by completing  -KG      Patient will improve oral skills to enhance safety and increase eating efficiency and bolus control through labial seal exercises with cues;without cues  -KG      Status: Patient will improve oral skills to enhance safety and increase eating efficiency and bolus control through labial seal exercises Progressing as expected  -KG      Comments: Patient will improve oral skills to enhance safety and increase eating efficiency and bolus control through labial seal exercises Able to complete exercises with estim on level 16  -KG      Patient will improve oral skills to enhance safety and increase eating efficiency by increasing accuracy of back of tongue control without cues  -KG      Status: Patient will improve oral skills to enhance safety and increase eating efficiency by increasing accuracy of back of tongue control Progressing as expected  -KG      Comments: Patient will improve oral skills to enhance safety and  increase eating efficiency by increasing accuracy of back of tongue control Completing exercises at home.   -KG      Patient will increase laryngeal elevation to reduce residue that might fall into airway by completing Mendelsohn maneuver;falsetto/pitch glide;without cues  -KG      Status: Patient will increase laryngeal elevation to reduce residue that might fall into airway by completing Progressing as expected  -KG      Comments: Patient will increase laryngeal elevation to reduce residue that might fall into airway by completing completing swallow exercises at home.   -KG      Patient will increase closure of larynx to keep food from falling into the airway by completing super-supraglottic swallow;push-pull with breath hold;without cues  -KG      Status: Patient will increase closure of larynx to keep food from falling into the airway by completing Progressing as expected  -KG      Comments: Patient will increase closure of larynx to keep food from falling into the airway by completing completing swallow exercises at home.   -KG      Patient will increase strength of tongue base and posterior pharyngeal walls to reduce residue that might fall into airway by completing effotful swallow;Carly (tongue hold);without cues  -KG      Status: Patient will increase strength of tongue base and posterior pharyngeal walls to reduce residue that might fall into airway by completing Progressing as expected  -KG      Comments: Patient will increase strength of tongue base and posterior pharyngeal walls to reduce residue that might fall into airway by completing completing swallow exercises at home.   -KG      Other Goals    Other Adult Goal- 1 Patient will increase lip closure to reduce anterior loss and drooling by completing lip closure exercises.   -KG      Status: Other Adult Goal- 1 Progressing as expected  -KG      Comments: Other Adult Goal- 1 Able to complete with estim on  -KG      Other Adult Goal- 2 Patient will  increase cheek strength for bolus formation by completing cheek exercises.   -KG      Status: Other Adult Goal- 2 Progressing as expected  -KG      Comments: Other Adult Goal- 2 He is completing oral motor and swallow exercises at home.   -KG      SLP Time Calculation    SLP Goal Re-Cert Due Date 02/03/17  -KG        User Key  (r) = Recorded By, (t) = Taken By, (c) = Cosigned By    Initials Name Provider Type    [facility] Speech and Language Pathologist                OP SLP Education       01/24/17 0905          Education    Barriers to Learning No barriers identified  -KG      Education Provided Patient demonstrated recommended strategies  -KG      Assessed Learning needs;Learning motivation;Learning preferences;Learning readiness  -KG      Learning Motivation Strong  -KG      Learning Method Explanation;Demonstration  -KG      Teaching Response Verbalized understanding;Demonstrated understanding  -KG        User Key  (r) = Recorded By, (t) = Taken By, (c) = Cosigned By    Initials Name Effective Dates    KG [facility] 08/02/16 -                 OP SLP Assessment/Plan - 01/24/17 0904     SLP Assessment    Clinical Impression Comments Continues to progress. Facial symmetry is imrproved.   -KG    SLP Plan    Plan Comments Continue therapy  -KG      User Key  (r) = Recorded By, (t) = Taken By, (c) = Cosigned By    Initials Name Provider Type    [facility] Speech and Language Pathologist                Time Calculation:   SLP Start Time: 0845  SLP Stop Time: 0925  SLP Time Calculation (min): 40 min    Therapy Charges for Today     Code Description Service Date Service Provider Modifiers Qty    [id number] HC ST TREATMENT SWALLOW 3 1/24/2017 HI[facility] Jacome GN 1                   [facility] Mcneill  1/24/2017

## 2017-01-26 ENCOUNTER — HOSPITAL ENCOUNTER (OUTPATIENT)
Dept: SPEECH THERAPY | Facility: HOSPITAL | Age: 77
Setting detail: THERAPIES SERIES
Discharge: HOME | End: 2017-01-26

## 2017-01-26 DIAGNOSIS — R13.12 OROPHARYNGEAL DYSPHAGIA: ICD-10-CM

## 2017-01-26 PROCEDURE — 92526 ORAL FUNCTION THERAPY: CPT | Performed by: SPEECH-LANGUAGE PATHOLOGIST

## 2017-01-26 NOTE — PROGRESS NOTES
Outpatient Speech Language Pathology   Adult Swallow Treatment Note   [place]     Patient Name: Elliot Palma  : 1940  MRN: [id number]  Today's Date: 2017         Visit Date: 2017   Patient Active Problem List   Diagnosis   • Metastatic squamous cell carcinoma   • Cerebrovascular accident (CVA) due to thrombosis of left middle cerebral artery   • Atrial fibrillation   • Dyslipidemia   • Paroxysmal atrial flutter        Visit Dx:    ICD-10-CM ICD-9-CM   1. Oropharyngeal dysphagia R13.12 787.22                               SLP OP Goals       17 0900 17 0900 17 [id number]    Subjective Comments    Subjective Comments Patient was alert.   -KG Patient was alert, he stated that he feels he is improving.   -KG Patient expressed no concerns. He feels his cheek is getting stronger. No problems with diet.   -KG    Subjective Pain    Able to rate subjective pain? no  -KG no  -KG     Dysphagia Goals    Dysphagia LTG's Patient will safely consume the recommended diet without complications such as aspiration pneumonia  -KG Patient will safely consume the recommended diet without complications such as aspiration pneumonia  -KG Patient will safely consume the recommended diet without complications such as aspiration pneumonia  -KG    Patient will safely consume the recommended diet without complications such as aspiration pneumonia Regular diet with nectar thick liquids  -KG Regular diet with nectar thick liquids  -KG Regular diet with nectar thick liquids  -KG    Status: Patient will safely consume the recommended diet without complications such as aspiration pneumonia Progressing as expected  -KG Progressing as expected  -KG Progressing as expected  -KG    Comments: Patient will safely consume the recommended diet without complications such as aspiration pneumonia Patient reports no problems with regular diet.   -KG Patient reports no problems with regular diet. Throat clearing has improved.   -KG  Patient reports no problems with regular diet. Throat clearing has improved.   -KG    Dysphagia STG's Patient will improve oral skills to enhance safety and increase eating efficiency and bolus control through labial seal exercises;Patient will improve oral skills to enhance safety and increase eating efficiency by increasing accuracy of back of tongue control;Patient will increase laryngeal elevation to reduce residue that might fall into airway by completing;Patient will increase closure of larynx to keep food from falling into the airway by completing;Patient will increase strength of tongue base and posterior pharyngeal walls to reduce residue that might fall into airway by completing  -KG Patient will improve oral skills to enhance safety and increase eating efficiency and bolus control through labial seal exercises;Patient will improve oral skills to enhance safety and increase eating efficiency by increasing accuracy of back of tongue control;Patient will increase laryngeal elevation to reduce residue that might fall into airway by completing;Patient will increase closure of larynx to keep food from falling into the airway by completing;Patient will increase strength of tongue base and posterior pharyngeal walls to reduce residue that might fall into airway by completing  -KG Patient will improve oral skills to enhance safety and increase eating efficiency and bolus control through labial seal exercises;Patient will improve oral skills to enhance safety and increase eating efficiency by increasing accuracy of back of tongue control;Patient will increase laryngeal elevation to reduce residue that might fall into airway by completing;Patient will increase closure of larynx to keep food from falling into the airway by completing;Patient will increase strength of tongue base and posterior pharyngeal walls to reduce residue that might fall into airway by completing  -KG    Patient will improve oral skills to  enhance safety and increase eating efficiency and bolus control through labial seal exercises with cues;without cues  -KG with cues;without cues  -KG with cues;without cues  -KG    Status: Patient will improve oral skills to enhance safety and increase eating efficiency and bolus control through labial seal exercises Progressing as expected  -KG Progressing as expected  -KG Progressing as expected  -KG    Comments: Patient will improve oral skills to enhance safety and increase eating efficiency and bolus control through labial seal exercises Able to complete exercises with estim on level 16.5  -KG Able to complete exercises with estim on level 16.5  -KG Able to complete exercises with estim on level 16.5  -KG    Patient will improve oral skills to enhance safety and increase eating efficiency by increasing accuracy of back of tongue control without cues  -KG without cues  -KG without cues  -KG    Status: Patient will improve oral skills to enhance safety and increase eating efficiency by increasing accuracy of back of tongue control Progressing as expected  -KG Progressing as expected  -KG Progressing as expected  -KG    Comments: Patient will improve oral skills to enhance safety and increase eating efficiency by increasing accuracy of back of tongue control Completing exercises at home. No reported problems  -KG Completing exercises at home.   -KG Completing exercises at home.   -KG    Patient will increase laryngeal elevation to reduce residue that might fall into airway by completing Mendelsohn maneuver;falsetto/pitch glide;without cues  -KG Mendelsohn maneuver;falsetto/pitch glide;without cues  -KG Mendelsohn maneuver;falsetto/pitch glide;without cues  -KG    Status: Patient will increase laryngeal elevation to reduce residue that might fall into airway by completing Progressing as expected  -KG Progressing as expected  -KG Progressing as expected  -KG    Comments: Patient will increase laryngeal elevation to  reduce residue that might fall into airway by completing completing swallow exercises at home. No reported problems.  -KG completing swallow exercises at home.   -KG completing swallow exercises at home.   -KG    Patient will increase closure of larynx to keep food from falling into the airway by completing super-supraglottic swallow;push-pull with breath hold;without cues  -KG super-supraglottic swallow;push-pull with breath hold;without cues  -KG super-supraglottic swallow;push-pull with breath hold;without cues  -KG    Status: Patient will increase closure of larynx to keep food from falling into the airway by completing Progressing as expected  -KG Progressing as expected  -KG Progressing as expected  -KG    Comments: Patient will increase closure of larynx to keep food from falling into the airway by completing completing swallow exercises at home. No reported problems.   -KG completing swallow exercises at home.   -KG completing swallow exercises at home.   -KG    Patient will increase strength of tongue base and posterior pharyngeal walls to reduce residue that might fall into airway by completing effotful swallow;Carly (tongue hold);without cues  -KG effotful swallow;Carly (tongue hold);without cues  -KG effotful swallow;Carly (tongue hold);without cues  -KG    Status: Patient will increase strength of tongue base and posterior pharyngeal walls to reduce residue that might fall into airway by completing Progressing as expected  -KG Progressing as expected  -KG Progressing as expected  -KG    Comments: Patient will increase strength of tongue base and posterior pharyngeal walls to reduce residue that might fall into airway by completing completing swallow exercises at home. No reported problems.   -KG completing swallow exercises at home.   -KG completing swallow exercises at home.   -KG    Other Goals    Other Adult Goal- 1 Patient will increase lip closure to reduce anterior loss and drooling by  completing lip closure exercises.   -KG Patient will increase lip closure to reduce anterior loss and drooling by completing lip closure exercises.   -KG Patient will increase lip closure to reduce anterior loss and drooling by completing lip closure exercises.   -KG    Status: Other Adult Goal- 1 Progressing as expected  -KG Progressing as expected  -KG Progressing as expected  -KG    Comments: Other Adult Goal- 1 Able to complete with estim on. Facial symmetry is improved. Drooling has decreased.   -KG Able to complete with estim on. Facial symmetry is improved. Drooling has decreased.   -KG Able to complete with estim on  -KG    Other Adult Goal- 2 Patient will increase cheek strength for bolus formation by completing cheek exercises.   -KG Patient will increase cheek strength for bolus formation by completing cheek exercises.   -KG Patient will increase cheek strength for bolus formation by completing cheek exercises.   -KG    Status: Other Adult Goal- 2 Progressing as expected  -KG Progressing as expected  -KG Progressing as expected  -KG    Comments: Other Adult Goal- 2 He is completing oral motor and swallow exercises at home. Facial symmetry is improved. No reports of choking.   -KG He is completing oral motor and swallow exercises at home. Facial symmetry is improved. No reports of chokeing.   -KG He is completing oral motor and swallow exercises at home.   -KG    SLP Time Calculation    SLP Goal Re-Cert Due Date 02/23/17  -KG 02/23/17  -KG 02/23/17  -KG      01/17/17 0900          Subjective Comments    Subjective Comments Patient expressed no concerns. He was able to complete all exercises independently with estim on level 16.5  -KG      Subjective Pain    Able to rate subjective pain? no  -KG      Dysphagia Goals    Dysphagia LTG's Patient will safely consume the recommended diet without complications such as aspiration pneumonia  -KG      Patient will safely consume the recommended diet without  complications such as aspiration pneumonia Regular diet with nectar thick liquids  -KG      Status: Patient will safely consume the recommended diet without complications such as aspiration pneumonia Progressing as expected  -KG      Comments: Patient will safely consume the recommended diet without complications such as aspiration pneumonia Patient reports no problems with regular diet. Continues to clear throat, throat clearing has improved.   -KG      Dysphagia STG's Patient will improve oral skills to enhance safety and increase eating efficiency and bolus control through labial seal exercises;Patient will improve oral skills to enhance safety and increase eating efficiency by increasing accuracy of back of tongue control;Patient will increase laryngeal elevation to reduce residue that might fall into airway by completing;Patient will increase closure of larynx to keep food from falling into the airway by completing;Patient will increase strength of tongue base and posterior pharyngeal walls to reduce residue that might fall into airway by completing  -KG      Patient will improve oral skills to enhance safety and increase eating efficiency and bolus control through labial seal exercises with cues;without cues  -KG      Status: Patient will improve oral skills to enhance safety and increase eating efficiency and bolus control through labial seal exercises Progressing as expected  -KG      Comments: Patient will improve oral skills to enhance safety and increase eating efficiency and bolus control through labial seal exercises Able to complete exercises with estim on level 16.5  -KG      Patient will improve oral skills to enhance safety and increase eating efficiency by increasing accuracy of back of tongue control without cues  -KG      Status: Patient will improve oral skills to enhance safety and increase eating efficiency by increasing accuracy of back of tongue control Progressing as expected  -KG       Comments: Patient will improve oral skills to enhance safety and increase eating efficiency by increasing accuracy of back of tongue control Completing exercises at home.   -KG      Patient will increase laryngeal elevation to reduce residue that might fall into airway by completing Mendelsohn maneuver;falsetto/pitch glide;without cues  -KG      Status: Patient will increase laryngeal elevation to reduce residue that might fall into airway by completing Progressing as expected  -KG      Comments: Patient will increase laryngeal elevation to reduce residue that might fall into airway by completing completing swallow exercises at home.   -KG      Patient will increase closure of larynx to keep food from falling into the airway by completing super-supraglottic swallow;push-pull with breath hold;without cues  -KG      Status: Patient will increase closure of larynx to keep food from falling into the airway by completing Progressing as expected  -KG      Comments: Patient will increase closure of larynx to keep food from falling into the airway by completing completing swallow exercises at home.   -KG      Patient will increase strength of tongue base and posterior pharyngeal walls to reduce residue that might fall into airway by completing effotful swallow;Carly (tongue hold);without cues  -KG      Status: Patient will increase strength of tongue base and posterior pharyngeal walls to reduce residue that might fall into airway by completing Progressing as expected  -KG      Comments: Patient will increase strength of tongue base and posterior pharyngeal walls to reduce residue that might fall into airway by completing completing swallow exercises at home.   -KG      Other Goals    Other Adult Goal- 1 Patient will increase lip closure to reduce anterior loss and drooling by completing lip closure exercises.   -KG      Status: Other Adult Goal- 1 Progressing as expected  -KG      Comments: Other Adult Goal- 1 Able to  complete with estim on  -KG      Other Adult Goal- 2 Patient will increase cheek strength for bolus formation by completing cheek exercises.   -KG      Status: Other Adult Goal- 2 Progressing as expected  -KG      Comments: Other Adult Goal- 2 He is completing oral motor and swallow exercises at home.   -KG      SLP Time Calculation    SLP Goal Re-Cert Due Date 02/03/17  -KG        User Key  (r) = Recorded By, (t) = Taken By, (c) = Cosigned By    Initials Name Provider Type    CHAD Hurtado CCC-SLP Speech and Language Pathologist                OP SLP Education       01/26/17 0912 01/24/17 0905       Education    Barriers to Learning No barriers identified  -KG No barriers identified  -KG     Education Provided Patient demonstrated recommended strategies  -KG Patient demonstrated recommended strategies  -KG     Assessed Learning needs;Learning motivation;Learning preferences;Learning readiness  -KG Learning needs;Learning motivation;Learning preferences;Learning readiness  -KG     Learning Motivation Strong  -KG Strong  -KG     Learning Method Explanation;Demonstration  -KG Explanation;Demonstration  -KG     Teaching Response Verbalized understanding  -KG Verbalized understanding;Demonstrated understanding  -KG       User Key  (r) = Recorded By, (t) = Taken By, (c) = Cosigned By    Initials Name Effective Dates    KG MARGARITO JacomeSLP 08/02/16 -                 OP SLP Assessment/Plan - 01/26/17 0911     SLP Assessment    Clinical Impression Comments Patient continues to progress. Drooling is decreased. Facial symmetry improved. No reports of choking.   -KG    SLP Plan    Plan Comments Continue therapy one more week (2 sessions).   -KG      User Key  (r) = Recorded By, (t) = Taken By, (c) = Cosigned By    Initials Name Provider Type    CHAD Hurtado CCC-SLP Speech and Language Pathologist                Time Calculation:   SLP Start Time: 0845  SLP Stop Time: 0925  SLP Time Calculation (min): 40  min    Therapy Charges for Today     Code Description Service Date Service Provider Modifiers Qty    [id number]  ST TREATMENT SWALLOW 2 1/26/2017 Waleska Hurtado CCC-SLP GN 1                   Waleska Hurtado CCC-DENITA  1/26/2017

## 2017-01-31 ENCOUNTER — HOSPITAL ENCOUNTER (OUTPATIENT)
Dept: SPEECH THERAPY | Facility: HOSPITAL | Age: 77
Setting detail: THERAPIES SERIES
Discharge: HOME | End: 2017-01-31

## 2017-01-31 DIAGNOSIS — R13.12 OROPHARYNGEAL DYSPHAGIA: Primary | ICD-10-CM

## 2017-01-31 PROCEDURE — 92526 ORAL FUNCTION THERAPY: CPT | Performed by: SPEECH-LANGUAGE PATHOLOGIST

## 2017-02-02 ENCOUNTER — HOSPITAL ENCOUNTER (OUTPATIENT)
Dept: SPEECH THERAPY | Facility: HOSPITAL | Age: 77
Setting detail: THERAPIES SERIES
Discharge: HOME | End: 2017-02-02

## 2017-02-02 DIAGNOSIS — R13.12 OROPHARYNGEAL DYSPHAGIA: Primary | ICD-10-CM

## 2017-02-02 PROCEDURE — 92526 ORAL FUNCTION THERAPY: CPT

## 2017-02-02 PROCEDURE — G8997 SWALLOW GOAL STATUS: HCPCS | Performed by: SPEECH-LANGUAGE PATHOLOGIST

## 2017-02-02 PROCEDURE — G8998 SWALLOW D/C STATUS: HCPCS | Performed by: SPEECH-LANGUAGE PATHOLOGIST

## 2017-02-02 NOTE — THERAPY DISCHARGE NOTE
Outpatient Speech Language Pathology   Adult Swallow Treatment Note/Discharge Summary   [place]     Patient Name: Elilot Palma  : 1940  MRN: [id number]  Today's Date: 2017         Visit Date: 2017   Patient Active Problem List   Diagnosis   • Metastatic squamous cell carcinoma   • Cerebrovascular accident (CVA) due to thrombosis of left middle cerebral artery   • Atrial fibrillation   • Dyslipidemia   • Paroxysmal atrial flutter        Visit Dx:    ICD-10-CM ICD-9-CM   1. Oropharyngeal dysphagia R13.12 787.22                               SLP OP Goals       17    Subjective Comments    Subjective Comments (P)  Patient was alert and cooperative. He was able to complete all exercises with minimal cues.   -KR Patient was alert and able to complete all exercises with minimal cues.   -KG Patient was alert.   -KG    Subjective Pain    Able to rate subjective pain?  no  -KG no  -KG    Dysphagia Goals    Dysphagia LTG's (P)  Patient will safely consume the recommended diet without complications such as aspiration pneumonia  -KR Patient will safely consume the recommended diet without complications such as aspiration pneumonia  -KG Patient will safely consume the recommended diet without complications such as aspiration pneumonia  -KG    Patient will safely consume the recommended diet without complications such as aspiration pneumonia (P)  Regular diet with nectar thick liquids  -KR Regular diet with nectar thick liquids  -KG Regular diet with nectar thick liquids  -KG    Status: Patient will safely consume the recommended diet without complications such as aspiration pneumonia (P)  Progressing as expected  -KR Progressing as expected  -KG Progressing as expected  -KG    Comments: Patient will safely consume the recommended diet without complications such as aspiration pneumonia (P)  Patient reports no problems with regular diet.   -KR Patient reports no problems  with regular diet.   -KG Patient reports no problems with regular diet.   -KG    Dysphagia STG's (P)  Patient will improve oral skills to enhance safety and increase eating efficiency and bolus control through labial seal exercises;Patient will improve oral skills to enhance safety and increase eating efficiency by increasing accuracy of back of tongue control;Patient will increase laryngeal elevation to reduce residue that might fall into airway by completing;Patient will increase closure of larynx to keep food from falling into the airway by completing;Patient will increase strength of tongue base and posterior pharyngeal walls to reduce residue that might fall into airway by completing  -KR Patient will improve oral skills to enhance safety and increase eating efficiency and bolus control through labial seal exercises;Patient will improve oral skills to enhance safety and increase eating efficiency by increasing accuracy of back of tongue control;Patient will increase laryngeal elevation to reduce residue that might fall into airway by completing;Patient will increase closure of larynx to keep food from falling into the airway by completing;Patient will increase strength of tongue base and posterior pharyngeal walls to reduce residue that might fall into airway by completing  -KG Patient will improve oral skills to enhance safety and increase eating efficiency and bolus control through labial seal exercises;Patient will improve oral skills to enhance safety and increase eating efficiency by increasing accuracy of back of tongue control;Patient will increase laryngeal elevation to reduce residue that might fall into airway by completing;Patient will increase closure of larynx to keep food from falling into the airway by completing;Patient will increase strength of tongue base and posterior pharyngeal walls to reduce residue that might fall into airway by completing  -KG    Patient will improve oral skills to  enhance safety and increase eating efficiency and bolus control through labial seal exercises (P)  with cues;without cues  -KR with cues;without cues  -KG with cues;without cues  -KG    Status: Patient will improve oral skills to enhance safety and increase eating efficiency and bolus control through labial seal exercises (P)  Progressing as expected  -KR Progressing as expected  -KG Progressing as expected  -KG    Comments: Patient will improve oral skills to enhance safety and increase eating efficiency and bolus control through labial seal exercises (P)  Able to complete exercises with estim on level 16.5  -KR Able to complete exercises with estim on level 16.5  -KG Able to complete exercises with estim on level 16.5  -KG    Patient will improve oral skills to enhance safety and increase eating efficiency by increasing accuracy of back of tongue control (P)  without cues  -KR without cues  -KG without cues  -KG    Status: Patient will improve oral skills to enhance safety and increase eating efficiency by increasing accuracy of back of tongue control (P)  Progressing as expected  -KR Progressing as expected  -KG Progressing as expected  -KG    Comments: Patient will improve oral skills to enhance safety and increase eating efficiency by increasing accuracy of back of tongue control (P)  Patient reports completing exercises at home with no problems.   -KR Completing exercises at home. No reported problems  -KG Completing exercises at home. No reported problems  -KG    Patient will increase laryngeal elevation to reduce residue that might fall into airway by completing (P)  Mendelsohn maneuver;falsetto/pitch glide;without cues  -KR Mendelsohn maneuver;falsetto/pitch glide;without cues  -KG Mendelsohn maneuver;falsetto/pitch glide;without cues  -KG    Status: Patient will increase laryngeal elevation to reduce residue that might fall into airway by completing (P)  Progressing as expected  -KR Progressing as expected   -KG Progressing as expected  -KG    Comments: Patient will increase laryngeal elevation to reduce residue that might fall into airway by completing (P)  Patient completes exercises at home with no problems.   -KR completing swallow exercises at home. No reported problems.  -KG completing swallow exercises at home. No reported problems.  -KG    Patient will increase closure of larynx to keep food from falling into the airway by completing (P)  super-supraglottic swallow;push-pull with breath hold;without cues  -KR super-supraglottic swallow;push-pull with breath hold;without cues  -KG super-supraglottic swallow;push-pull with breath hold;without cues  -KG    Status: Patient will increase closure of larynx to keep food from falling into the airway by completing (P)  Progressing as expected  -KR Progressing as expected  -KG Progressing as expected  -KG    Comments: Patient will increase closure of larynx to keep food from falling into the airway by completing (P)  completing swallow exercises at home. No reported problems.   -KR completing swallow exercises at home. No reported problems.   -KG completing swallow exercises at home. No reported problems.   -KG    Patient will increase strength of tongue base and posterior pharyngeal walls to reduce residue that might fall into airway by completing (P)  effotful swallow;Carly (tongue hold);without cues  -KR effotful swallow;Carly (tongue hold);without cues  -KG effotful swallow;Carly (tongue hold);without cues  -KG    Status: Patient will increase strength of tongue base and posterior pharyngeal walls to reduce residue that might fall into airway by completing (P)  Progressing as expected  -KR Progressing as expected  -KG Progressing as expected  -KG    Comments: Patient will increase strength of tongue base and posterior pharyngeal walls to reduce residue that might fall into airway by completing (P)  completing swallow exercises at home. No reported problems.   -KR  completing swallow exercises at home. No reported problems.   -KG completing swallow exercises at home. No reported problems.   -KG    Other Goals    Other Adult Goal- 1 (P)  Patient will increase lip closure to reduce anterior loss and drooling by completing lip closure exercises.   -KR Patient will increase lip closure to reduce anterior loss and drooling by completing lip closure exercises.   -KG Patient will increase lip closure to reduce anterior loss and drooling by completing lip closure exercises.   -KG    Status: Other Adult Goal- 1 (P)  Progressing as expected  -KR Progressing as expected  -KG Progressing as expected  -KG    Comments: Other Adult Goal- 1 (P)  Able to complete with estim on. Facial symmetry is improved with no drooling or other difficulties noted.   -KR Able to complete with estim on. Facial symmetry is improved. No drooling or mouth wiping noted.   -KG Able to complete with estim on. Facial symmetry is improved. Drooling has decreased.   -KG    Other Adult Goal- 2 (P)  Patient will increase cheek strength for bolus formation by completing cheek exercises.   -KR Patient will increase cheek strength for bolus formation by completing cheek exercises.   -KG Patient will increase cheek strength for bolus formation by completing cheek exercises.   -KG    Status: Other Adult Goal- 2 (P)  Progressing as expected  -KR Progressing as expected  -KG Progressing as expected  -KG    Comments: Other Adult Goal- 2 (P)  He is completing oral motor and swallow exercises at home. Facial symmetry is improved and no drooling was noted. Patient reported improvement since initially beginning therapy with no continuing issues.  -KR He is completing oral motor and swallow exercises at home. Facial symmetry is improved. No reports of choking.   -KG He is completing oral motor and swallow exercises at home. Facial symmetry is improved. No reports of choking.   -KG    SLP Time Calculation    SLP Goal Re-Cert Due Date  (P)  02/02/17  -KR 02/02/17   error previous note  -KG 02/23/17  -KG      01/24/17 0900          Subjective Comments    Subjective Comments Patient was alert, he stated that he feels he is improving.   -KG      Subjective Pain    Able to rate subjective pain? no  -KG      Dysphagia Goals    Dysphagia LTG's Patient will safely consume the recommended diet without complications such as aspiration pneumonia  -KG      Patient will safely consume the recommended diet without complications such as aspiration pneumonia Regular diet with nectar thick liquids  -KG      Status: Patient will safely consume the recommended diet without complications such as aspiration pneumonia Progressing as expected  -KG      Comments: Patient will safely consume the recommended diet without complications such as aspiration pneumonia Patient reports no problems with regular diet. Throat clearing has improved.   -KG      Dysphagia STG's Patient will improve oral skills to enhance safety and increase eating efficiency and bolus control through labial seal exercises;Patient will improve oral skills to enhance safety and increase eating efficiency by increasing accuracy of back of tongue control;Patient will increase laryngeal elevation to reduce residue that might fall into airway by completing;Patient will increase closure of larynx to keep food from falling into the airway by completing;Patient will increase strength of tongue base and posterior pharyngeal walls to reduce residue that might fall into airway by completing  -KG      Patient will improve oral skills to enhance safety and increase eating efficiency and bolus control through labial seal exercises with cues;without cues  -KG      Status: Patient will improve oral skills to enhance safety and increase eating efficiency and bolus control through labial seal exercises Progressing as expected  -KG      Comments: Patient will improve oral skills to enhance safety and increase eating  efficiency and bolus control through labial seal exercises Able to complete exercises with estim on level 16.5  -KG      Patient will improve oral skills to enhance safety and increase eating efficiency by increasing accuracy of back of tongue control without cues  -KG      Status: Patient will improve oral skills to enhance safety and increase eating efficiency by increasing accuracy of back of tongue control Progressing as expected  -KG      Comments: Patient will improve oral skills to enhance safety and increase eating efficiency by increasing accuracy of back of tongue control Completing exercises at home.   -KG      Patient will increase laryngeal elevation to reduce residue that might fall into airway by completing Mendelsohn maneuver;falsetto/pitch glide;without cues  -KG      Status: Patient will increase laryngeal elevation to reduce residue that might fall into airway by completing Progressing as expected  -KG      Comments: Patient will increase laryngeal elevation to reduce residue that might fall into airway by completing completing swallow exercises at home.   -KG      Patient will increase closure of larynx to keep food from falling into the airway by completing super-supraglottic swallow;push-pull with breath hold;without cues  -KG      Status: Patient will increase closure of larynx to keep food from falling into the airway by completing Progressing as expected  -KG      Comments: Patient will increase closure of larynx to keep food from falling into the airway by completing completing swallow exercises at home.   -KG      Patient will increase strength of tongue base and posterior pharyngeal walls to reduce residue that might fall into airway by completing effotful swallow;Carly (tongue hold);without cues  -KG      Status: Patient will increase strength of tongue base and posterior pharyngeal walls to reduce residue that might fall into airway by completing Progressing as expected  -KG       Comments: Patient will increase strength of tongue base and posterior pharyngeal walls to reduce residue that might fall into airway by completing completing swallow exercises at home.   -KG      Other Goals    Other Adult Goal- 1 Patient will increase lip closure to reduce anterior loss and drooling by completing lip closure exercises.   -KG      Status: Other Adult Goal- 1 Progressing as expected  -KG      Comments: Other Adult Goal- 1 Able to complete with estim on. Facial symmetry is improved. Drooling has decreased.   -KG      Other Adult Goal- 2 Patient will increase cheek strength for bolus formation by completing cheek exercises.   -KG      Status: Other Adult Goal- 2 Progressing as expected  -KG      Comments: Other Adult Goal- 2 He is completing oral motor and swallow exercises at home. Facial symmetry is improved. No reports of chokeing.   -KG      SLP Time Calculation    SLP Goal Re-Cert Due Date 02/23/17  -KG        User Key  (r) = Recorded By, (t) = Taken By, (c) = Cosigned By    Initials Name Provider Type    KG Waleska Hurtado CCC-SLP Speech and Language Pathologist    ZARA Villegas, Speech Therapy Student Speech Therapy Student                OP SLP Education       02/02/17 1119 01/31/17 0906       Education    Barriers to Learning (P)  No barriers identified  -KR No barriers identified  -KG     Education Provided (P)  Patient demonstrated recommended strategies  -KR Patient demonstrated recommended strategies  -KG     Assessed (P)  Learning needs;Learning motivation;Learning preferences;Learning readiness  -KR Learning needs;Learning motivation;Learning preferences;Learning readiness  -KG     Learning Motivation (P)  Strong  -KR Strong  -KG     Learning Method (P)  Demonstration;Explanation  -KR Teach back  -KG     Teaching Response (P)  Demonstrated understanding  -KR Verbalized understanding  -KG       User Key  (r) = Recorded By, (t) = Taken By, (c) = Cosigned By    Initials Name  Effective Dates    CHAD Galeano MALIKA Hurtado CCC-SLP 08/02/16 -     ZARA Villegas Speech Therapy Student 12/19/16 -                 OP SLP Assessment/Plan - 02/02/17 1116     SLP Assessment    Clinical Impression Comments (P)  Patient was able to complete all swallowing and oral motor exercises with minimal cues. Patient reported successfully completing exercises at home with no issues. Facial symmetry has increased and there was no sign of drooling or choking.   -KR    SLP Plan    Plan Comments (P)  Patient will be discharged from therapy and was instructed to continue routine oral motor and swallowing exercises at home.   -ZARA      User Key  (r) = Recorded By, (t) = Taken By, (c) = Cosigned By    Initials Name Provider Type    ZARA Villegas Speech Therapy Student Speech Therapy Student                Time Calculation:   SLP Start Time: (P) 0900  SLP Stop Time: (P) 0940  SLP Time Calculation (min): (P) 40 min    Therapy Charges for Today     Code Description Service Date Service Provider Modifiers Qty    [id number] HC ST TREATMENT SWALLOW 3 2/2/2017 Carole Villegas Speech Therapy Student GN 1               OP SLP Discharge Summary  Date of Discharge: (P) 02/02/17  Reason for Discharge: (P) All goals achieved  Outcomes Achieved: (P) Able to achieve all goals within established timeline  Discharge Destination: (P) Home with home program  Discharge Instructions: (P) Patient will continue with home exercises.       Carole Villegas Speech Therapy Student  2/2/2017

## 2017-02-24 ENCOUNTER — OFFICE VISIT (OUTPATIENT)
Dept: NEUROLOGY | Facility: CLINIC | Age: 77
End: 2017-02-24

## 2017-02-24 VITALS
BODY MASS INDEX: 21.94 KG/M2 | WEIGHT: 162 LBS | SYSTOLIC BLOOD PRESSURE: 118 MMHG | HEART RATE: 64 BPM | DIASTOLIC BLOOD PRESSURE: 82 MMHG | RESPIRATION RATE: 18 BRPM | HEIGHT: 72 IN

## 2017-02-24 DIAGNOSIS — I63.312 CEREBROVASCULAR ACCIDENT (CVA) DUE TO THROMBOSIS OF LEFT MIDDLE CEREBRAL ARTERY (HCC): Primary | ICD-10-CM

## 2017-02-24 DIAGNOSIS — R13.10 DYSPHAGIA, UNSPECIFIED TYPE: ICD-10-CM

## 2017-02-24 DIAGNOSIS — E78.5 DYSLIPIDEMIA: ICD-10-CM

## 2017-02-24 DIAGNOSIS — I48.91 ATRIAL FIBRILLATION, UNSPECIFIED TYPE (HCC): ICD-10-CM

## 2017-02-24 PROCEDURE — 99213 OFFICE O/P EST LOW 20 MIN: CPT | Performed by: CLINICAL NURSE SPECIALIST

## 2017-02-24 RX ORDER — DOXYCYCLINE HYCLATE 50 MG/1
CAPSULE ORAL
Refills: 1 | COMMUNITY
Start: 2017-01-27 | End: 2017-08-22

## 2017-02-24 RX ORDER — BRIMONIDINE TARTRATE 2 MG/ML
SOLUTION/ DROPS OPHTHALMIC
Refills: 0 | COMMUNITY
Start: 2017-02-06 | End: 2023-01-25

## 2017-02-24 RX ORDER — LATANOPROST 50 UG/ML
SOLUTION/ DROPS OPHTHALMIC
Refills: 0 | COMMUNITY
Start: 2017-02-06

## 2017-02-24 RX ORDER — ATENOLOL 100 MG/1
TABLET ORAL
Refills: 5 | COMMUNITY
Start: 2017-02-06 | End: 2017-03-17 | Stop reason: SDUPTHER

## 2017-02-24 RX ORDER — LISINOPRIL AND HYDROCHLOROTHIAZIDE 20; 12.5 MG/1; MG/1
1 TABLET ORAL DAILY
COMMUNITY
Start: 2017-02-22

## 2017-02-24 NOTE — PROGRESS NOTES
Subjective     Chief Complaint   Patient presents with   • Stroke     3 month          Elliot Palma is a 76 y.o. male right handed retiree, . He is here today for follow up for stroke. He was last seen 11/2016. At that time he was having choking with liquids and was referred to . Dysphagia has resolved and he was released 1/31/17.  He has no complaints today. As you recall he had a right frontal stroke 11/2016 and was found to have afib. He has been taking Pradaxa since an denies bleeding.  He is also taking a statin. He did have petechial hemorrhage after and f/u Ct head showed evolving hemorrhage.         HPI Comments: Patient woke on the morning of 11/7/16 with slurred speech and left facial droop. He was outside the window for TPA. He was newly diagnosed with AFIB and at that time was not taking an antocoagulant.     Stroke   This is a new problem. The current episode started 1 to 4 weeks ago (11/7/2016). The problem has been gradually improving. Pertinent negatives include no arthralgias, chest pain, fatigue, fever, headaches, nausea, sore throat, vomiting or weakness. Associated symptoms comments: Left lower facial droop, slurred speech, and drooling. This has improved and with mild flattening of nasolabial crease and patient does report some coughing with liquids.. Exacerbated by: was new diagnosis of afib and not taking anticoagulant. Treatments tried: now taking Pradaxa.        Current Outpatient Prescriptions   Medication Sig Dispense Refill   • aspirin 81 MG chewable tablet Chew 81 mg Daily.     • atorvastatin (LIPITOR) 10 MG tablet Take 1 tablet by mouth Every Night. 30 tablet 5   • brimonidine (ALPHAGAN) 0.2 % ophthalmic solution INSTILL ONE DROP INTO EACH EYE BID  0   • Dabigatran Etexilate Mesylate (PRADAXA PO) Take  by mouth 2 (Two) Times a Day.     • doxycycline (VIBRAMYCIN) 100 MG capsule Take 100 mg by mouth 2 (Two) Times a Day.     • latanoprost (XALATAN) 0.005 % ophthalmic  solution INSTILL ONE DROP INTO EACH EYE QHS  0   • lisinopril-hydrochlorothiazide (PRINZIDE,ZESTORETIC) 20-12.5 MG per tablet      • Multiple Vitamins-Minerals (MULTIVITAMIN ADULT PO) Take  by mouth.     • PRADAXA 150 MG capsu TK 1 C PO BID  5   • doxycycline (VIBRAMYCIN) 50 MG capsule TK 1 C PO BID  1     No current facility-administered medications for this visit.        Past Medical History   Diagnosis Date   • Arrhythmia    • Atrial fibrillation    • Body mass index (BMI) of 23.0-23.9 in adult    • Glaucoma    • Hearing loss    • Hypertension    • Multiple actinic keratoses    • Squamous cell cancer of scalp and skin of neck    • Squamous cell carcinoma      metastatic, of left posterior cervical lymph node   • Squamous cell carcinoma      of scalp and lower left extremity   • Stroke    • Wears glasses        Past Surgical History   Procedure Laterality Date   • Orif ankle fracture Left    • Knee arthroscopy Bilateral    • Cervical lymph node biopsy/excision Left      excision of left posterior cervical lymph node   • Other surgical history  2013     electrodesiccation and curettage of scalp   • Other surgical history  2011     electrodesiccation and curettage of left lower extremity   • Parotidectomy Left      left superficial parotidectomy/left posterolateral neck dissection/resection of prior scar/ligation of left chle leak   • Colonoscopy       normal   • Mohs surgery Right 09/2016     Right Neck and Right Leg   • Mohs surgery Right 2016     Ear   • Neck surgery         family history includes Cancer in his mother and sister; No Known Problems in his son; Stroke in his father.    Social History   Substance Use Topics   • Smoking status: Never Smoker   • Smokeless tobacco: Never Used      Comment: nonuser   • Alcohol use Yes      Comment: few times a week       Review of Systems   Constitutional: Negative.  Negative for fatigue and fever.   HENT: Negative.  Negative for nosebleeds, sinus pressure and sore  "throat.    Eyes: Negative.  Negative for visual disturbance.   Respiratory: Negative.  Negative for chest tightness and shortness of breath.    Cardiovascular: Negative.  Negative for chest pain.   Gastrointestinal: Negative.  Negative for blood in stool, constipation, diarrhea, nausea and vomiting.   Endocrine: Negative.    Genitourinary: Negative.  Negative for dysuria, frequency and hematuria.   Musculoskeletal: Negative for arthralgias, back pain and gait problem.   Skin: Negative.    Allergic/Immunologic: Negative.    Neurological: Negative.  Negative for speech difficulty, weakness, light-headedness and headaches.        Some coughing with liquids   Hematological: Negative.  Negative for adenopathy.   Psychiatric/Behavioral: Negative.  Negative for agitation and confusion.   All other systems reviewed and are negative.      Objective     Visit Vitals   • /82 (BP Location: Left arm, Patient Position: Sitting, Cuff Size: Adult)   • Pulse 64   • Resp 18   • Ht 72\" (182.9 cm)   • Wt 162 lb (73.5 kg)   • BMI 21.97 kg/m2   , Body mass index is 21.97 kg/(m^2).    Physical Exam   Constitutional: He is oriented to person, place, and time. Vital signs are normal. He appears well-developed and well-nourished.   HENT:   Head: Normocephalic and atraumatic.   Right Ear: Hearing and external ear normal.   Left Ear: Hearing and external ear normal.   Nose: Nose normal.   Mouth/Throat: Uvula is midline, oropharynx is clear and moist and mucous membranes are normal.   Eyes: EOM and lids are normal. Pupils are equal, round, and reactive to light.   Neck: Trachea normal and normal range of motion. Neck supple. Carotid bruit is not present.   Cardiovascular: Normal rate, regular rhythm, S1 normal, S2 normal and normal heart sounds.    No murmur heard.  Pulmonary/Chest: Effort normal and breath sounds normal.   Abdominal: Soft. Bowel sounds are normal.   Musculoskeletal: Normal range of motion.   Neurological: He is alert and " oriented to person, place, and time. He has normal strength and normal reflexes. No cranial nerve deficit (mild flattening of nasolabial crease and expression react symmetrically) or sensory deficit. He displays a negative Romberg sign. Coordination (no ataxia, no tremor) and gait normal. GCS eye subscore is 4. GCS verbal subscore is 5. GCS motor subscore is 6.   Reflex Scores:       Tricep reflexes are 2+ on the right side and 2+ on the left side.       Bicep reflexes are 2+ on the right side and 2+ on the left side.       Brachioradialis reflexes are 2+ on the right side and 2+ on the left side.       Patellar reflexes are 2+ on the right side and 2+ on the left side.       Achilles reflexes are 2+ on the right side and 2+ on the left side.  CN II:  Visual fields full.  Pupils equally reactive to light  CN III, IV, VI:  Extraocular Muscles full with no signs of nystagmus  CN V:  Facial sensory is symmetric with no asymetries.  CN VII:  Facial motor symmetric  CN VIII:  Gross hearing intact bilaterally  CN IX:  Palate elevates symmetrically  CN X:  Palate elevates symmetrically  CN XI:  Shoulder shrug symmetric  CN XII:  Tongue is midline on protrusion   Skin: Skin is warm and dry.   Psychiatric: He has a normal mood and affect. His speech is normal and behavior is normal. Cognition and memory are normal.   Nursing note and vitals reviewed.      Results for orders placed or performed during the hospital encounter of 11/07/16   Comprehensive Metabolic Panel   Result Value Ref Range    Glucose 115 (H) 70 - 100 mg/dL    BUN 16 5 - 21 mg/dL    Creatinine 0.86 0.50 - 1.40 mg/dL    Sodium 141 135 - 145 mmol/L    Potassium 4.5 3.5 - 5.3 mmol/L    Chloride 101 98 - 110 mmol/L    CO2 29.0 24.0 - 31.0 mmol/L    Calcium 9.8 8.4 - 10.4 mg/dL    Total Protein 8.0 6.3 - 8.7 g/dL    Albumin 4.80 3.50 - 5.00 g/dL    ALT (SGPT) 35 0 - 54 U/L    AST (SGOT) 38 7 - 45 U/L    Alkaline Phosphatase 61 24 - 120 U/L    Total Bilirubin 1.3  (H) 0.1 - 1.0 mg/dL    eGFR Non African Amer 64 >60 mL/min/1.73    Globulin 3.2 gm/dL    A/G Ratio 1.5 1.1 - 2.5 g/dL    BUN/Creatinine Ratio 18.6 7.0 - 25.0    Anion Gap 11.0 4.0 - 13.0 mmol/L   Protime-INR   Result Value Ref Range    Protime 12.8 11.9 - 14.6 Seconds    INR 0.94 0.91 - 1.09   CBC Auto Differential   Result Value Ref Range    WBC 3.60 (L) 4.80 - 10.80 10*3/mm3    RBC 4.36 4.20 - 5.40 10*6/mm3    Hemoglobin 14.2 12.0 - 16.0 g/dL    Hematocrit 40.9 37.0 - 47.0 %    MCV 93.8 82.0 - 98.0 fL    MCH 32.6 (H) 28.0 - 32.0 pg    MCHC 34.7 33.0 - 36.0 g/dL    RDW 13.2 12.0 - 15.0 %    RDW-SD 45.0 40.0 - 54.0 fl    MPV 9.6 6.0 - 12.0 fL    Platelets 188 130 - 400 10*3/mm3    Neutrophil % 60.0 39.0 - 78.0 %    Lymphocyte % 29.2 15.0 - 45.0 %    Monocyte % 8.3 4.0 - 12.0 %    Eosinophil % 1.9 0.0 - 4.0 %    Basophil % 0.6 0.0 - 2.0 %    Immature Grans % 0.0 0.0 - 5.0 %    Neutrophils, Absolute 2.16 1.87 - 8.40 10*3/mm3    Lymphocytes, Absolute 1.05 0.72 - 4.86 10*3/mm3    Monocytes, Absolute 0.30 0.19 - 1.30 10*3/mm3    Eosinophils, Absolute 0.07 0.00 - 0.70 10*3/mm3    Basophils, Absolute 0.02 0.00 - 0.20 10*3/mm3    Immature Grans, Absolute 0.00 0.00 - 0.03 10*3/mm3   Hemoglobin A1c   Result Value Ref Range    Hemoglobin A1C 5.6 %   Lipid Panel   Result Value Ref Range    Total Cholesterol 170 130 - 200 mg/dL    Triglycerides 105 0 - 149 mg/dL    HDL Cholesterol 53 >=40 mg/dL    LDL Cholesterol  94 0 - 99 mg/dL    LDL/HDL Ratio 1.81    C-reactive Protein   Result Value Ref Range    C-Reactive Protein <0.50 0.00 - 0.99 mg/dL   Folate   Result Value Ref Range    Folate >20.00 >2.75 ng/mL   Sedimentation Rate   Result Value Ref Range    Sed Rate 9 0 - 15 mm/hr   TSH   Result Value Ref Range    TSH 1.540 0.470 - 4.680 mIU/mL   Vitamin B12   Result Value Ref Range    Vitamin B-12 486 239 - 931 pg/mL   RPR   Result Value Ref Range    RPR Non Reactive Non Reactive   POC Glucose Fingerstick   Result Value Ref Range     Glucose 115 70 - 130 mg/dL   Adult Transthoracic Echo Complete   Result Value Ref Range    BSA 2.0 m^2    IVSd 0.95 cm    LVIDd 4.2 cm    LVIDs 3.1 cm    LVPWd 1.1 cm    IVS/LVPW 0.88     FS 27.4 %    EDV(Teich) 78.6 ml    ESV(Teich) 36.4 ml    EF(Teich) 53.6 %    EDV(cubed) 74.1 ml    ESV(cubed) 28.4 ml    EF(cubed) 61.7 %    LV mass(C)d 138.7 grams    LV mass(C)dI 70.6 grams/m^2    SV(Teich) 42.1 ml    SI(Teich) 21.5 ml/m^2    SV(cubed) 45.7 ml    SI(cubed) 23.3 ml/m^2    Ao root diam 3.8 cm    Ao root area 11.3 cm^2    LA dimension 2.8 cm    LA/Ao 0.74     LVOT diam 2.2 cm    LVOT area 3.8 cm^2    LVOT area(traced) 3.8 cm^2    LVLd ap4 6.9 cm    EDV(MOD-sp4) 34.5 ml    LVLs ap4 6.3 cm    ESV(MOD-sp4) 16.1 ml    EF(MOD-sp4) 53.3 %    SV(MOD-sp4) 18.4 ml    SI(MOD-sp4) 9.4 ml/m^2    Ao root area (BSA corrected) 1.9     CONTRAST EF 4CH 53.3 ml/m^2    LV Diastolic corrected for BSA 17.6 ml/m^2    LV Systolic corrected for BSA 8.2 ml/m^2    MV E max vangie 93.1 cm/sec    MV dec time 0.21 sec    Ao pk vangie 121.0 cm/sec    Ao max PG 5.9 mmHg    Ao max PG (full) 4.0 mmHg    Ao V2 mean 78.4 cm/sec    Ao mean PG 3.0 mmHg    Ao mean PG (full) 2.0 mmHg    Ao V2 VTI 17.9 cm    KEVIN(I,A) 2.4 cm^2    KEVIN(I,D) 2.4 cm^2    KEVIN(V,A) 2.2 cm^2    KEVIN(V,D) 2.2 cm^2    LV V1 max PG 1.9 mmHg    LV V1 mean PG 1.0 mmHg    LV V1 max 68.7 cm/sec    LV V1 mean 39.9 cm/sec    LV V1 VTI 11.3 cm    SV(Ao) 203.0 ml    SI(Ao) 103.4 ml/m^2    SV(LVOT) 43.0 ml    SI(LVOT) 21.9 ml/m^2    TR max vangie 259.0 cm/sec    RVSP(TR) 36.8 mmHg    RAP systole 10.0 mmHg     CV ECHO GABRIEL - BZI_BMI 22.4 kilograms/m^2     CV ECHO GABRIEL - BSA(HAYCOCK) 1.9 m^2     CV ECHO GABRIEL - BZI_METRIC_WEIGHT 74.8 kg     CV ECHO GABRIEL - BZI_METRIC_HEIGHT 182.9 cm   Light Blue Top   Result Value Ref Range    Extra Tube hold for add-on    Green Top (Gel)   Result Value Ref Range    Extra Tube Hold for add-ons.    Lavender Top   Result Value Ref Range    Extra Tube hold for add-on     Red Top   Result Value Ref Range    Extra Tube Hold for add-ons.         ASSESSMENT/PLAN    Diagnoses and all orders for this visit:    Cerebrovascular accident (CVA) due to thrombosis of left middle cerebral artery    Atrial fibrillation, unspecified type    Dyslipidemia    Dysphagia, unspecified type  Comments:  resolved and released from ST    Other orders  -     brimonidine (ALPHAGAN) 0.2 % ophthalmic solution; INSTILL ONE DROP INTO EACH EYE BID  -     PRADAXA 150 MG capsu; TK 1 C PO BID  -     doxycycline (VIBRAMYCIN) 50 MG capsule; TK 1 C PO BID  -     latanoprost (XALATAN) 0.005 % ophthalmic solution; INSTILL ONE DROP INTO EACH EYE QHS  -     lisinopril-hydrochlorothiazide (PRINZIDE,ZESTORETIC) 20-12.5 MG per tablet;     MEDICAL DECISION MAKIN. Continue with Pradaxa per PCP and cardiology. For anticoagulation for afib  2. Continue with statin managed by PCP for LDL goal less than 70.  3.continue with BP management per PCP for systolic less than 140.  4. Patient is counseled on stroke signs and symptoms using FAST and Time Saved is Brain Saved.  5.Discussed secondary stroke prevention to include a systolic blood pressure goal of less than 140, LDL goal less than 70, and 30-40 minutes of heart rate elevating exercise 3-4 times per week. Continue antiplatelet.   6. dysphagia has resolved and released from ST.       allergies and all known medications/prescriptions have been reviewed using resources available on this encounter.    Return in about 6 months (around 2017).        Gita Blanca, [place]

## 2017-03-17 ENCOUNTER — OFFICE VISIT (OUTPATIENT)
Dept: CARDIOLOGY | Facility: CLINIC | Age: 77
End: 2017-03-17

## 2017-03-17 VITALS
BODY MASS INDEX: 21.67 KG/M2 | OXYGEN SATURATION: 95 % | HEIGHT: 72 IN | HEART RATE: 66 BPM | SYSTOLIC BLOOD PRESSURE: 110 MMHG | DIASTOLIC BLOOD PRESSURE: 60 MMHG | WEIGHT: 160 LBS

## 2017-03-17 DIAGNOSIS — I10 ESSENTIAL HYPERTENSION: ICD-10-CM

## 2017-03-17 DIAGNOSIS — I63.312 CEREBROVASCULAR ACCIDENT (CVA) DUE TO THROMBOSIS OF LEFT MIDDLE CEREBRAL ARTERY (HCC): ICD-10-CM

## 2017-03-17 DIAGNOSIS — E78.5 DYSLIPIDEMIA: ICD-10-CM

## 2017-03-17 DIAGNOSIS — I48.92 PAROXYSMAL ATRIAL FLUTTER (HCC): Primary | ICD-10-CM

## 2017-03-17 PROCEDURE — 99214 OFFICE O/P EST MOD 30 MIN: CPT | Performed by: INTERNAL MEDICINE

## 2017-03-17 PROCEDURE — 93000 ELECTROCARDIOGRAM COMPLETE: CPT | Performed by: INTERNAL MEDICINE

## 2017-03-17 NOTE — PROGRESS NOTES
Reason for Visit: cardiovascular follow up.    HPI:  Elliot Palma is a 76 y.o. male is here today for 3 month follow-up.  He was initially found to be in atrial flutter when admitted with a CVA in November 2016.  He was back in sinus rhythm at last visit in December.  He is back in atrial flutter on EKG today.  He has still been running and went about 1.5 to 2 miles this morning.  His blood pressure is low today and he does not check it at home. At last doctor visit his systolic blood pressure was 140.  He denies any dizziness, syncope, PND, or orthopnea.      Previous Cardiac Testing and Procedures:  - Echo (11/09/2016) EF 55-60%, left atrium mildly dilated, normal RV size and function, mild MR and TR    Patient Active Problem List   Diagnosis   • Metastatic squamous cell carcinoma   • Cerebrovascular accident (CVA) due to thrombosis of left middle cerebral artery   • Atrial fibrillation   • Dyslipidemia   • Paroxysmal atrial flutter   • Dysphagia       Social History   Substance Use Topics   • Smoking status: Never Smoker   • Smokeless tobacco: Never Used      Comment: nonuser   • Alcohol use Yes      Comment: few times a week       Family History   Problem Relation Age of Onset   • Cancer Mother      breast and metastatic, cause of death   • Stroke Father      cause of death   • Cancer Sister      skin   • No Known Problems Son        The following portions of the patient's history were reviewed and updated as appropriate: allergies, current medications, past family history, past medical history, past social history, past surgical history and problem list.      Current Outpatient Prescriptions:   •  aspirin 81 MG chewable tablet, Chew 81 mg Daily., Disp: , Rfl:   •  atorvastatin (LIPITOR) 10 MG tablet, Take 1 tablet by mouth Every Night., Disp: 30 tablet, Rfl: 5  •  brimonidine (ALPHAGAN) 0.2 % ophthalmic solution, INSTILL ONE DROP INTO EACH EYE BID, Disp: , Rfl: 0  •  Dabigatran Etexilate Mesylate (PRADAXA  "PO), Take  by mouth 2 (Two) Times a Day., Disp: , Rfl:   •  doxycycline (VIBRAMYCIN) 100 MG capsule, Take 100 mg by mouth 2 (Two) Times a Day., Disp: , Rfl:   •  doxycycline (VIBRAMYCIN) 50 MG capsule, TK 1 C PO BID, Disp: , Rfl: 1  •  latanoprost (XALATAN) 0.005 % ophthalmic solution, INSTILL ONE DROP INTO EACH EYE QHS, Disp: , Rfl: 0  •  lisinopril-hydrochlorothiazide (PRINZIDE,ZESTORETIC) 20-12.5 MG per tablet, , Disp: , Rfl:   •  Multiple Vitamins-Minerals (MULTIVITAMIN ADULT PO), Take  by mouth., Disp: , Rfl:     Review of Systems   Constitution: Negative for chills and fever.   Cardiovascular: Negative for chest pain and paroxysmal nocturnal dyspnea.   Respiratory: Negative for cough and shortness of breath.    Skin: Negative for rash.   Gastrointestinal: Negative for abdominal pain and heartburn.   Neurological: Negative for dizziness and numbness.       Objective   Visit Vitals   • /60 (BP Location: Left arm, Patient Position: Sitting, Cuff Size: Adult)   • Pulse 66   • Ht 72\" (182.9 cm)   • Wt 160 lb (72.6 kg)   • SpO2 95%   • BMI 21.7 kg/m2     Physical Exam   Constitutional: He is oriented to person, place, and time. He appears well-developed and well-nourished.   HENT:   Head: Normocephalic and atraumatic.   Cardiovascular: Normal rate and normal heart sounds.  An irregularly irregular rhythm present.   No murmur heard.  Pulmonary/Chest: Effort normal and breath sounds normal.   Musculoskeletal: He exhibits no edema.   Neurological: He is alert and oriented to person, place, and time.   Skin: Skin is warm and dry.   Psychiatric: He has a normal mood and affect.       ECG 12 Lead  Date/Time: 3/17/2017 9:24 AM  Performed by: HEATH PEREZ  Authorized by: HEATH PEREZ   Comparison: compared with previous ECG from 12/16/2016  Comparison to previous ECG: Atrial flutter has replaced sinus rhythm  Rhythm: atrial flutter  Rate: normal              ICD-10-CM ICD-9-CM   1. Paroxysmal atrial flutter " I48.92 427.32   2. Cerebrovascular accident (CVA) due to thrombosis of left middle cerebral artery I63.312 434.01   3. Dyslipidemia E78.5 272.4   4. Essential hypertension I10 401.9         Assessment/Plan:  1. Paroxysmal atrial flutter: Patient is back in atrial flutter today.  He appears completely asymptomatic with excellent functional capacity and will therefore continue rate control strategy.  Heart rate is controlled without any AV krystina blocking drugs.  Continue anticoagulation with Pradaxa.      2. History of CVA:  Left MCA. November 2016.  Likely secondary to atrial flutter.  Continue Pradaxa.     3. Dyslipidemia: Continue atorvastatin 10 mg.    4. Hypertension: Blood pressure is well controlled on current medications.

## 2017-07-17 ENCOUNTER — OFFICE VISIT (OUTPATIENT)
Dept: CARDIOLOGY | Facility: CLINIC | Age: 77
End: 2017-07-17

## 2017-07-17 VITALS
DIASTOLIC BLOOD PRESSURE: 84 MMHG | HEART RATE: 58 BPM | BODY MASS INDEX: 20.72 KG/M2 | SYSTOLIC BLOOD PRESSURE: 112 MMHG | WEIGHT: 153 LBS | HEIGHT: 72 IN | OXYGEN SATURATION: 98 %

## 2017-07-17 DIAGNOSIS — I10 ESSENTIAL HYPERTENSION: ICD-10-CM

## 2017-07-17 DIAGNOSIS — I48.92 PAROXYSMAL ATRIAL FLUTTER (HCC): Primary | ICD-10-CM

## 2017-07-17 DIAGNOSIS — E78.5 DYSLIPIDEMIA: ICD-10-CM

## 2017-07-17 PROCEDURE — 99213 OFFICE O/P EST LOW 20 MIN: CPT | Performed by: INTERNAL MEDICINE

## 2017-07-17 PROCEDURE — 93000 ELECTROCARDIOGRAM COMPLETE: CPT | Performed by: INTERNAL MEDICINE

## 2017-07-17 NOTE — PROGRESS NOTES
Reason for Visit: cardiovascular follow up.    HPI:  Elliot Palma is a 77 y.o. male is here today for follow-up.  He recently had a basal cell carcinoma removed and had a skin graft to his ear and neck on the right.  He is otherwise doing well.  He has not been able to do much running due to the surgery.  He did not have to stop the Pradaxa.  He does not notice much that he is out of rhythm but he can tell his heart rate is irregular when he checks his rate.      Previous Cardiac Testing and Procedures:  - Echo (11/09/2016) EF 55-60%, left atrium mildly dilated, normal RV size and function, mild MR and TR    Patient Active Problem List   Diagnosis   • Metastatic squamous cell carcinoma   • Cerebrovascular accident (CVA) due to thrombosis of left middle cerebral artery   • Atrial fibrillation   • Dyslipidemia   • Paroxysmal atrial flutter   • Dysphagia       Social History   Substance Use Topics   • Smoking status: Never Smoker   • Smokeless tobacco: Never Used      Comment: nonuser   • Alcohol use Yes      Comment: few times a week       Family History   Problem Relation Age of Onset   • Cancer Mother      breast and metastatic, cause of death   • Stroke Father      cause of death   • Cancer Sister      skin   • No Known Problems Son        The following portions of the patient's history were reviewed and updated as appropriate: allergies, current medications, past family history, past medical history, past social history, past surgical history and problem list.      Current Outpatient Prescriptions:   •  aspirin 81 MG chewable tablet, Chew 81 mg Daily., Disp: , Rfl:   •  atorvastatin (LIPITOR) 10 MG tablet, Take 1 tablet by mouth Every Night., Disp: 30 tablet, Rfl: 5  •  brimonidine (ALPHAGAN) 0.2 % ophthalmic solution, INSTILL ONE DROP INTO EACH EYE BID, Disp: , Rfl: 0  •  Dabigatran Etexilate Mesylate (PRADAXA PO), Take  by mouth 2 (Two) Times a Day., Disp: , Rfl:   •  doxycycline (VIBRAMYCIN) 100 MG capsule,  "Take 100 mg by mouth 2 (Two) Times a Day., Disp: , Rfl:   •  doxycycline (VIBRAMYCIN) 50 MG capsule, TK 1 C PO BID, Disp: , Rfl: 1  •  latanoprost (XALATAN) 0.005 % ophthalmic solution, INSTILL ONE DROP INTO EACH EYE QHS, Disp: , Rfl: 0  •  lisinopril-hydrochlorothiazide (PRINZIDE,ZESTORETIC) 20-12.5 MG per tablet, , Disp: , Rfl:   •  Multiple Vitamins-Minerals (MULTIVITAMIN ADULT PO), Take  by mouth., Disp: , Rfl:     Review of Systems   Constitution: Negative for chills and fever.   Cardiovascular: Negative for chest pain and paroxysmal nocturnal dyspnea.   Respiratory: Negative for cough and shortness of breath.    Skin: Negative for rash.   Gastrointestinal: Negative for abdominal pain and heartburn.   Neurological: Negative for dizziness and numbness.       Objective   /84 (BP Location: Right arm, Patient Position: Sitting, Cuff Size: Adult)  Pulse 58  Ht 72\" (182.9 cm)  Wt 153 lb (69.4 kg)  SpO2 98%  BMI 20.75 kg/m2  Physical Exam   Constitutional: He is oriented to person, place, and time. He appears well-developed and well-nourished.   HENT:   Head: Normocephalic and atraumatic.   Cardiovascular: Normal rate and normal heart sounds.  An irregular rhythm present.   No murmur heard.  Pulmonary/Chest: Effort normal and breath sounds normal.   Musculoskeletal: He exhibits no edema.   Neurological: He is alert and oriented to person, place, and time.   Skin: Skin is warm and dry.   Psychiatric: He has a normal mood and affect.       ECG 12 Lead  Date/Time: 7/17/2017 8:45 AM  Performed by: HEATH PEREZ  Authorized by: HEATH PEREZ   Comparison: compared with previous ECG from 3/17/2017  Rhythm: atrial flutter  Rate: normal              ICD-10-CM ICD-9-CM   1. Paroxysmal atrial flutter I48.92 427.32   2. Essential hypertension I10 401.9   3. Dyslipidemia E78.5 272.4         Assessment/Plan:  1. Paroxysmal atrial flutter: Remains in atrial flutter and primarily asymptomatic.  Continue Pradaxa.  " Consider stopping aspirin.  Heart rate normal without any AV krystina blocking drugs. No strong indication for cardioversion.      2. Hypertension: Blood pressure remains within normal limits.      3. Dyslipidemia: Continue therapy with atorvastatin 10 mg daily.

## 2017-08-22 ENCOUNTER — OFFICE VISIT (OUTPATIENT)
Dept: NEUROLOGY | Facility: CLINIC | Age: 77
End: 2017-08-22

## 2017-08-22 VITALS
HEART RATE: 62 BPM | BODY MASS INDEX: 20.32 KG/M2 | DIASTOLIC BLOOD PRESSURE: 88 MMHG | HEIGHT: 72 IN | SYSTOLIC BLOOD PRESSURE: 128 MMHG | WEIGHT: 150 LBS

## 2017-08-22 DIAGNOSIS — I48.91 ATRIAL FIBRILLATION, UNSPECIFIED TYPE (HCC): ICD-10-CM

## 2017-08-22 DIAGNOSIS — I63.312 CEREBROVASCULAR ACCIDENT (CVA) DUE TO THROMBOSIS OF LEFT MIDDLE CEREBRAL ARTERY (HCC): Primary | ICD-10-CM

## 2017-08-22 DIAGNOSIS — E78.5 DYSLIPIDEMIA: ICD-10-CM

## 2017-08-22 PROCEDURE — 99213 OFFICE O/P EST LOW 20 MIN: CPT | Performed by: CLINICAL NURSE SPECIALIST

## 2017-08-22 NOTE — PROGRESS NOTES
Subjective     Chief Complaint   Patient presents with   • Stroke     No new stroke symptoms         Elliot Palma is a 77 y.o. male right handed retiree, . He is here today for follow up for stroke. He was last seen 2/2017. Since then he as done well and denies new stroke symptoms. He does have bilateral conjuctivae irritation and is seeing Dr. Ellis and is taking eye drops for increased pressure. He denies changes/loss of vision. He has no lasting effects of his stroke. He states he runs aobut 1 1/2 miles daily.      As you recall he had a right frontal stroke 11/2016 and was found to have afib. He has been taking Pradaxa since an denies bleeding.  He is also taking a statin. He did have petechial hemorrhage after and f/u Ct head showed evolving hemorrhage.      HPI Comments: Patient woke on the morning of 11/7/16 with slurred speech and left facial droop. He was outside the window for TPA. He was newly diagnosed with AFIB and at that time was not taking an antocoagulant.     Stroke   This is a new problem. The current episode started 1 to 4 weeks ago (11/7/2016). The problem has been gradually improving. Pertinent negatives include no arthralgias, chest pain, fatigue, fever, headaches, nausea, sore throat, vomiting or weakness. Associated symptoms comments: Left lower facial droop, slurred speech, and drooling. This has improved and with mild flattening of nasolabial crease and patient does report some coughing with liquids.. Exacerbated by: was new diagnosis of afib and not taking anticoagulant. Treatments tried: now taking Pradaxa.        Current Outpatient Prescriptions   Medication Sig Dispense Refill   • aspirin 81 MG chewable tablet Chew 81 mg Daily.     • atorvastatin (LIPITOR) 10 MG tablet Take 1 tablet by mouth Every Night. 30 tablet 5   • brimonidine (ALPHAGAN) 0.2 % ophthalmic solution INSTILL ONE DROP INTO EACH EYE BID  0   • Dabigatran Etexilate Mesylate (PRADAXA PO) Take  by mouth  2 (Two) Times a Day.     • doxycycline (VIBRAMYCIN) 100 MG capsule Take 100 mg by mouth 2 (Two) Times a Day.     • latanoprost (XALATAN) 0.005 % ophthalmic solution INSTILL ONE DROP INTO EACH EYE QHS  0   • lisinopril-hydrochlorothiazide (PRINZIDE,ZESTORETIC) 20-12.5 MG per tablet Take 1 tablet by mouth Daily.     • Multiple Vitamins-Minerals (MULTIVITAMIN ADULT PO) Take  by mouth.       No current facility-administered medications for this visit.        Past Medical History:   Diagnosis Date   • Arrhythmia    • Atrial fibrillation    • Body mass index (BMI) of 23.0-23.9 in adult    • Glaucoma    • Hearing loss    • Hypertension    • Multiple actinic keratoses    • Squamous cell cancer of scalp and skin of neck    • Squamous cell carcinoma     metastatic, of left posterior cervical lymph node   • Squamous cell carcinoma     of scalp and lower left extremity   • Stroke    • Wears glasses        Past Surgical History:   Procedure Laterality Date   • CERVICAL LYMPH NODE BIOPSY/EXCISION Left     excision of left posterior cervical lymph node   • COLONOSCOPY      normal   • KNEE ARTHROSCOPY Bilateral    • MOHS SURGERY Right 09/2016    Right Neck and Right Leg   • MOHS SURGERY Right 2016    Ear   • NECK SURGERY     • ORIF ANKLE FRACTURE Left    • OTHER SURGICAL HISTORY  2013    electrodesiccation and curettage of scalp   • OTHER SURGICAL HISTORY  2011    electrodesiccation and curettage of left lower extremity   • PAROTIDECTOMY Left     left superficial parotidectomy/left posterolateral neck dissection/resection of prior scar/ligation of left chle leak   • SKIN GRAFT      righ ear        family history includes Cancer in his mother and sister; No Known Problems in his son; Stroke in his father.    Social History   Substance Use Topics   • Smoking status: Never Smoker   • Smokeless tobacco: Never Used      Comment: nonuser   • Alcohol use Yes      Comment: few times a week       Review of Systems   Constitutional:  "Negative.  Negative for fatigue and fever.   HENT: Negative.  Negative for nosebleeds, postnasal drip, sinus pressure and sore throat.    Eyes: Negative.  Negative for visual disturbance.   Respiratory: Negative.  Negative for chest tightness and shortness of breath.    Cardiovascular: Negative.  Negative for chest pain.   Gastrointestinal: Negative.  Negative for blood in stool, constipation, diarrhea, nausea and vomiting.   Endocrine: Negative.    Genitourinary: Negative.  Negative for dysuria, frequency and hematuria.   Musculoskeletal: Negative for arthralgias, back pain and gait problem.   Skin: Negative.    Allergic/Immunologic: Negative.    Neurological: Negative.  Negative for speech difficulty, weakness, light-headedness and headaches.        Some coughing with liquids   Hematological: Negative.  Negative for adenopathy.   Psychiatric/Behavioral: Negative.  Negative for agitation, confusion and hallucinations.   All other systems reviewed and are negative.      Objective     /88  Pulse 62  Ht 72\" (182.9 cm)  Wt 150 lb (68 kg)  BMI 20.34 kg/m2, Body mass index is 20.34 kg/(m^2).    Physical Exam   Constitutional: He is oriented to person, place, and time. Vital signs are normal. He appears well-developed and well-nourished.   HENT:   Head: Normocephalic and atraumatic.   Right Ear: Hearing and external ear normal.   Left Ear: Hearing and external ear normal.   Nose: Nose normal.   Mouth/Throat: Uvula is midline, oropharynx is clear and moist and mucous membranes are normal.   Eyes: EOM and lids are normal. Pupils are equal, round, and reactive to light.   Bilateral conjunctivae redness/irritation   Neck: Trachea normal and normal range of motion. Neck supple. Carotid bruit is not present.   Cardiovascular: Normal rate, regular rhythm, S1 normal, S2 normal and normal heart sounds.    No murmur heard.  Pulmonary/Chest: Effort normal and breath sounds normal.   Abdominal: Soft. Bowel sounds are " normal.   Musculoskeletal: Normal range of motion.   Neurological: He is alert and oriented to person, place, and time. He has normal strength and normal reflexes. No cranial nerve deficit (mild flattening of nasolabial crease and expression react symmetrically) or sensory deficit. He displays a negative Romberg sign. Coordination (no ataxia, no tremor) and gait normal. GCS eye subscore is 4. GCS verbal subscore is 5. GCS motor subscore is 6.   Reflex Scores:       Tricep reflexes are 2+ on the right side and 2+ on the left side.       Bicep reflexes are 2+ on the right side and 2+ on the left side.       Brachioradialis reflexes are 2+ on the right side and 2+ on the left side.       Patellar reflexes are 2+ on the right side and 2+ on the left side.       Achilles reflexes are 2+ on the right side and 2+ on the left side.  CN II:  Visual fields full.  Pupils equally reactive to light  CN III, IV, VI:  Extraocular Muscles full with no signs of nystagmus  CN V:  Facial sensory is symmetric with no asymetries.  CN VII:  Facial motor symmetric  CN VIII:  Gross hearing intact bilaterally  CN IX:  Palate elevates symmetrically  CN X:  Palate elevates symmetrically  CN XI:  Shoulder shrug symmetric  CN XII:  Tongue is midline on protrusion   Skin: Skin is warm and dry.   Psychiatric: He has a normal mood and affect. His speech is normal and behavior is normal. Cognition and memory are normal.   Nursing note and vitals reviewed.      Results for orders placed or performed during the hospital encounter of 11/07/16   Comprehensive Metabolic Panel   Result Value Ref Range    Glucose 115 (H) 70 - 100 mg/dL    BUN 16 5 - 21 mg/dL    Creatinine 0.86 0.50 - 1.40 mg/dL    Sodium 141 135 - 145 mmol/L    Potassium 4.5 3.5 - 5.3 mmol/L    Chloride 101 98 - 110 mmol/L    CO2 29.0 24.0 - 31.0 mmol/L    Calcium 9.8 8.4 - 10.4 mg/dL    Total Protein 8.0 6.3 - 8.7 g/dL    Albumin 4.80 3.50 - 5.00 g/dL    ALT (SGPT) 35 0 - 54 U/L    AST  (SGOT) 38 7 - 45 U/L    Alkaline Phosphatase 61 24 - 120 U/L    Total Bilirubin 1.3 (H) 0.1 - 1.0 mg/dL    eGFR Non African Amer 64 >60 mL/min/1.73    Globulin 3.2 gm/dL    A/G Ratio 1.5 1.1 - 2.5 g/dL    BUN/Creatinine Ratio 18.6 7.0 - 25.0    Anion Gap 11.0 4.0 - 13.0 mmol/L   Protime-INR   Result Value Ref Range    Protime 12.8 11.9 - 14.6 Seconds    INR 0.94 0.91 - 1.09   CBC Auto Differential   Result Value Ref Range    WBC 3.60 (L) 4.80 - 10.80 10*3/mm3    RBC 4.36 4.20 - 5.40 10*6/mm3    Hemoglobin 14.2 12.0 - 16.0 g/dL    Hematocrit 40.9 37.0 - 47.0 %    MCV 93.8 82.0 - 98.0 fL    MCH 32.6 (H) 28.0 - 32.0 pg    MCHC 34.7 33.0 - 36.0 g/dL    RDW 13.2 12.0 - 15.0 %    RDW-SD 45.0 40.0 - 54.0 fl    MPV 9.6 6.0 - 12.0 fL    Platelets 188 130 - 400 10*3/mm3    Neutrophil % 60.0 39.0 - 78.0 %    Lymphocyte % 29.2 15.0 - 45.0 %    Monocyte % 8.3 4.0 - 12.0 %    Eosinophil % 1.9 0.0 - 4.0 %    Basophil % 0.6 0.0 - 2.0 %    Immature Grans % 0.0 0.0 - 5.0 %    Neutrophils, Absolute 2.16 1.87 - 8.40 10*3/mm3    Lymphocytes, Absolute 1.05 0.72 - 4.86 10*3/mm3    Monocytes, Absolute 0.30 0.19 - 1.30 10*3/mm3    Eosinophils, Absolute 0.07 0.00 - 0.70 10*3/mm3    Basophils, Absolute 0.02 0.00 - 0.20 10*3/mm3    Immature Grans, Absolute 0.00 0.00 - 0.03 10*3/mm3   Hemoglobin A1c   Result Value Ref Range    Hemoglobin A1C 5.6 %   Lipid Panel   Result Value Ref Range    Total Cholesterol 170 130 - 200 mg/dL    Triglycerides 105 0 - 149 mg/dL    HDL Cholesterol 53 >=40 mg/dL    LDL Cholesterol  94 0 - 99 mg/dL    LDL/HDL Ratio 1.81    C-reactive Protein   Result Value Ref Range    C-Reactive Protein <0.50 0.00 - 0.99 mg/dL   Folate   Result Value Ref Range    Folate >20.00 >2.75 ng/mL   Sedimentation Rate   Result Value Ref Range    Sed Rate 9 0 - 15 mm/hr   TSH   Result Value Ref Range    TSH 1.540 0.470 - 4.680 mIU/mL   Vitamin B12   Result Value Ref Range    Vitamin B-12 486 239 - 931 pg/mL   RPR   Result Value Ref Range     RPR Non Reactive Non Reactive   POC Glucose Fingerstick   Result Value Ref Range    Glucose 115 70 - 130 mg/dL   Adult Transthoracic Echo Complete   Result Value Ref Range    BSA 2.0 m^2    IVSd 0.95 cm    LVIDd 4.2 cm    LVIDs 3.1 cm    LVPWd 1.1 cm    IVS/LVPW 0.88     FS 27.4 %    EDV(Teich) 78.6 ml    ESV(Teich) 36.4 ml    EF(Teich) 53.6 %    EDV(cubed) 74.1 ml    ESV(cubed) 28.4 ml    EF(cubed) 61.7 %    LV mass(C)d 138.7 grams    LV mass(C)dI 70.6 grams/m^2    SV(Teich) 42.1 ml    SI(Teich) 21.5 ml/m^2    SV(cubed) 45.7 ml    SI(cubed) 23.3 ml/m^2    Ao root diam 3.8 cm    Ao root area 11.3 cm^2    LA dimension 2.8 cm    LA/Ao 0.74     LVOT diam 2.2 cm    LVOT area 3.8 cm^2    LVOT area(traced) 3.8 cm^2    LVLd ap4 6.9 cm    EDV(MOD-sp4) 34.5 ml    LVLs ap4 6.3 cm    ESV(MOD-sp4) 16.1 ml    EF(MOD-sp4) 53.3 %    SV(MOD-sp4) 18.4 ml    SI(MOD-sp4) 9.4 ml/m^2    Ao root area (BSA corrected) 1.9     CONTRAST EF 4CH 53.3 ml/m^2    LV Diastolic corrected for BSA 17.6 ml/m^2    LV Systolic corrected for BSA 8.2 ml/m^2    MV E max vangie 93.1 cm/sec    MV dec time 0.21 sec    Ao pk vangie 121.0 cm/sec    Ao max PG 5.9 mmHg    Ao max PG (full) 4.0 mmHg    Ao V2 mean 78.4 cm/sec    Ao mean PG 3.0 mmHg    Ao mean PG (full) 2.0 mmHg    Ao V2 VTI 17.9 cm    KEVIN(I,A) 2.4 cm^2    KEVIN(I,D) 2.4 cm^2    KEVIN(V,A) 2.2 cm^2    KEVIN(V,D) 2.2 cm^2    LV V1 max PG 1.9 mmHg    LV V1 mean PG 1.0 mmHg    LV V1 max 68.7 cm/sec    LV V1 mean 39.9 cm/sec    LV V1 VTI 11.3 cm    SV(Ao) 203.0 ml    SI(Ao) 103.4 ml/m^2    SV(LVOT) 43.0 ml    SI(LVOT) 21.9 ml/m^2    TR max vangie 259.0 cm/sec    RVSP(TR) 36.8 mmHg    RAP systole 10.0 mmHg     CV ECHO GABRIEL - BZI_BMI 22.4 kilograms/m^2     CV ECHO GABRIEL - BSA(HAYCOCK) 1.9 m^2     CV ECHO GABRIEL - BZI_METRIC_WEIGHT 74.8 kg     CV ECHO GABRIEL - BZI_METRIC_HEIGHT 182.9 cm   Light Blue Top   Result Value Ref Range    Extra Tube hold for add-on    Green Top (Gel)   Result Value Ref Range    Extra Tube Hold  for add-ons.    Lavender Top   Result Value Ref Range    Extra Tube hold for add-on    Red Top   Result Value Ref Range    Extra Tube Hold for add-ons.         ASSESSMENT/PLAN    Diagnoses and all orders for this visit:    Cerebrovascular accident (CVA) due to thrombosis of left middle cerebral artery    Atrial fibrillation, unspecified type    Dyslipidemia      MEDICAL DECISION MAKIN. Continue with Pradaxa per PCP and cardiology. For anticoagulation for afib  2. Continue with statin managed by PCP for LDL goal less than 70.  3.continue with BP management per PCP for systolic less than 140.  4. Patient is counseled on stroke signs and symptoms using FAST and Time Saved is Brain Saved.  5.Discussed secondary stroke prevention to include a systolic blood pressure goal of less than 140, LDL goal less than 70, and 30-40 minutes of heart rate elevating exercise 3-4 times per week. Continue antiplatelet.   6. Healthy BMI  7. Does not use tobacco.     allergies and all known medications/prescriptions have been reviewed using resources available on this encounter.    Return in about 6 months (around 2018).        Gita Blanca, APRN

## 2018-02-05 ENCOUNTER — OFFICE VISIT (OUTPATIENT)
Dept: CARDIOLOGY | Facility: CLINIC | Age: 78
End: 2018-02-05

## 2018-02-05 VITALS
HEART RATE: 59 BPM | OXYGEN SATURATION: 98 % | SYSTOLIC BLOOD PRESSURE: 120 MMHG | DIASTOLIC BLOOD PRESSURE: 86 MMHG | HEIGHT: 72 IN | BODY MASS INDEX: 20.99 KG/M2 | WEIGHT: 155 LBS

## 2018-02-05 DIAGNOSIS — R55 SYNCOPE AND COLLAPSE: ICD-10-CM

## 2018-02-05 DIAGNOSIS — I10 ESSENTIAL HYPERTENSION: ICD-10-CM

## 2018-02-05 DIAGNOSIS — E78.5 DYSLIPIDEMIA: ICD-10-CM

## 2018-02-05 DIAGNOSIS — I48.92 PAROXYSMAL ATRIAL FLUTTER (HCC): Primary | ICD-10-CM

## 2018-02-05 PROCEDURE — 99214 OFFICE O/P EST MOD 30 MIN: CPT | Performed by: INTERNAL MEDICINE

## 2018-02-05 NOTE — PROGRESS NOTES
Reason for Visit: cardiovascular follow up.    HPI:  Elliot Palma is a 77 y.o. male is here today for follow-up.  He has been doing well for the most part.  Had some high blood pressure around the time of his skin graft. And had an episode of syncope.  Blood pressure has since been better controlled and has not had any further syncopal episodes.  Has not had any issues from the atrial fibrillation/flutter.      Previous Cardiac Testing and Procedures:  - Echo (11/09/2016) EF 55-60%, left atrium mildly dilated, normal RV size and function, mild MR and TR    Patient Active Problem List   Diagnosis   • Metastatic squamous cell carcinoma   • Cerebrovascular accident (CVA) due to thrombosis of left middle cerebral artery   • Atrial fibrillation   • Dyslipidemia   • Paroxysmal atrial flutter   • Dysphagia   • Essential hypertension       Social History   Substance Use Topics   • Smoking status: Never Smoker   • Smokeless tobacco: Never Used      Comment: nonuser   • Alcohol use Yes      Comment: few times a week       Family History   Problem Relation Age of Onset   • Cancer Mother      breast and metastatic, cause of death   • Stroke Father      cause of death   • Cancer Sister      skin   • No Known Problems Son        The following portions of the patient's history were reviewed and updated as appropriate: allergies, current medications, past family history, past medical history, past social history, past surgical history and problem list.      Current Outpatient Prescriptions:   •  aspirin 81 MG chewable tablet, Chew 81 mg Daily., Disp: , Rfl:   •  atorvastatin (LIPITOR) 10 MG tablet, Take 1 tablet by mouth Every Night., Disp: 30 tablet, Rfl: 5  •  brimonidine (ALPHAGAN) 0.2 % ophthalmic solution, INSTILL ONE DROP INTO EACH EYE BID, Disp: , Rfl: 0  •  Dabigatran Etexilate Mesylate (PRADAXA PO), Take  by mouth 2 (Two) Times a Day., Disp: , Rfl:   •  doxycycline (VIBRAMYCIN) 100 MG capsule, Take 100 mg by mouth 2  "(Two) Times a Day., Disp: , Rfl:   •  latanoprost (XALATAN) 0.005 % ophthalmic solution, INSTILL ONE DROP INTO EACH EYE QHS, Disp: , Rfl: 0  •  lisinopril-hydrochlorothiazide (PRINZIDE,ZESTORETIC) 20-12.5 MG per tablet, Take 1 tablet by mouth Daily., Disp: , Rfl:   •  Multiple Vitamins-Minerals (MULTIVITAMIN ADULT PO), Take  by mouth., Disp: , Rfl:     Review of Systems   Constitution: Negative for chills and fever.   Cardiovascular: Negative for chest pain and paroxysmal nocturnal dyspnea.   Respiratory: Negative for cough and shortness of breath.    Skin: Negative for rash.   Gastrointestinal: Negative for abdominal pain and heartburn.   Neurological: Negative for dizziness and numbness.       Objective   /86 (BP Location: Left arm, Patient Position: Sitting, Cuff Size: Adult)  Pulse 59  Ht 182.9 cm (72.01\")  Wt 70.3 kg (155 lb)  SpO2 98%  BMI 21.02 kg/m2  Physical Exam   Constitutional: He is oriented to person, place, and time. He appears well-developed and well-nourished.   HENT:   Head: Normocephalic and atraumatic.   Cardiovascular: Normal rate and normal heart sounds.  An irregular rhythm present.   No murmur heard.  Pulmonary/Chest: Effort normal and breath sounds normal.   Musculoskeletal: He exhibits no edema.   Neurological: He is alert and oriented to person, place, and time.   Skin: Skin is warm and dry.   Psychiatric: He has a normal mood and affect.     Procedures      ICD-10-CM ICD-9-CM   1. Paroxysmal atrial flutter I48.92 427.32   2. Essential hypertension I10 401.9   3. Dyslipidemia E78.5 272.4   4. Syncope and collapse R55 780.2         Assessment/Plan:  1. Paroxysmal atrial flutter: Remains asymptomatic.  Continue Pradaxa.  Consider stopping aspirin.  Heart rate normal without any AV krystina blocking drugs. No strong indication for cardioversion.      2. Hypertension: Blood pressure remains well controlled on current medications.      3. Dyslipidemia: Continue atorvastatin 10 mg " daily.    4.  Syncope and collapse: Secondary to medication in ED for hypertension.  No further episodes.

## 2018-02-22 ENCOUNTER — OFFICE VISIT (OUTPATIENT)
Dept: NEUROLOGY | Facility: CLINIC | Age: 78
End: 2018-02-22

## 2018-02-22 VITALS
BODY MASS INDEX: 20.86 KG/M2 | SYSTOLIC BLOOD PRESSURE: 116 MMHG | WEIGHT: 154 LBS | HEART RATE: 60 BPM | HEIGHT: 72 IN | DIASTOLIC BLOOD PRESSURE: 78 MMHG

## 2018-02-22 DIAGNOSIS — I63.312 CEREBROVASCULAR ACCIDENT (CVA) DUE TO THROMBOSIS OF LEFT MIDDLE CEREBRAL ARTERY (HCC): Primary | ICD-10-CM

## 2018-02-22 DIAGNOSIS — I48.91 ATRIAL FIBRILLATION, UNSPECIFIED TYPE (HCC): ICD-10-CM

## 2018-02-22 DIAGNOSIS — I10 ESSENTIAL HYPERTENSION: ICD-10-CM

## 2018-02-22 DIAGNOSIS — I48.92 PAROXYSMAL ATRIAL FLUTTER (HCC): ICD-10-CM

## 2018-02-22 PROCEDURE — 99213 OFFICE O/P EST LOW 20 MIN: CPT | Performed by: CLINICAL NURSE SPECIALIST

## 2018-02-22 NOTE — PATIENT INSTRUCTIONS
Stroke Prevention  Some medical conditions and behaviors are associated with an increased chance of having a stroke. You may prevent a stroke by making healthy choices and managing medical conditions.  How can I reduce my risk of having a stroke?  · Stay physically active. Get at least 30 minutes of activity on most or all days.  · Do not smoke. It may also be helpful to avoid exposure to secondhand smoke.  · Limit alcohol use. Moderate alcohol use is considered to be:  ¨ No more than 2 drinks per day for men.  ¨ No more than 1 drink per day for nonpregnant women.  · Eat healthy foods. This involves:  ¨ Eating 5 or more servings of fruits and vegetables a day.  ¨ Making dietary changes that address high blood pressure (hypertension), high cholesterol, diabetes, or obesity.  · Manage your cholesterol levels.  ¨ Making food choices that are high in fiber and low in saturated fat, trans fat, and cholesterol may control cholesterol levels.  ¨ Take any prescribed medicines to control cholesterol as directed by your health care provider.  · Manage your diabetes.  ¨ Controlling your carbohydrate and sugar intake is recommended to manage diabetes.  ¨ Take any prescribed medicines to control diabetes as directed by your health care provider.  · Control your hypertension.  ¨ Making food choices that are low in salt (sodium), saturated fat, trans fat, and cholesterol is recommended to manage hypertension.  ¨ Ask your health care provider if you need treatment to lower your blood pressure. Take any prescribed medicines to control hypertension as directed by your health care provider.  ¨ If you are 18-39 years of age, have your blood pressure checked every 3-5 years. If you are 40 years of age or older, have your blood pressure checked every year.  · Maintain a healthy weight.  ¨ Reducing calorie intake and making food choices that are low in sodium, saturated fat, trans fat, and cholesterol are recommended to manage  weight.  · Stop drug abuse.  · Avoid taking birth control pills.  ¨ Talk to your health care provider about the risks of taking birth control pills if you are over 35 years old, smoke, get migraines, or have ever had a blood clot.  · Get evaluated for sleep disorders (sleep apnea).  ¨ Talk to your health care provider about getting a sleep evaluation if you snore a lot or have excessive sleepiness.  · Take medicines only as directed by your health care provider.  ¨ For some people, aspirin or blood thinners (anticoagulants) are helpful in reducing the risk of forming abnormal blood clots that can lead to stroke. If you have the irregular heart rhythm of atrial fibrillation, you should be on a blood thinner unless there is a good reason you cannot take them.  ¨ Understand all your medicine instructions.  · Make sure that other conditions (such as anemia or atherosclerosis) are addressed.  Get help right away if:  · You have sudden weakness or numbness of the face, arm, or leg, especially on one side of the body.  · Your face or eyelid droops to one side.  · You have sudden confusion.  · You have trouble speaking (aphasia) or understanding.  · You have sudden trouble seeing in one or both eyes.  · You have sudden trouble walking.  · You have dizziness.  · You have a loss of balance or coordination.  · You have a sudden, severe headache with no known cause.  · You have new chest pain or an irregular heartbeat.  Any of these symptoms may represent a serious problem that is an emergency. Do not wait to see if the symptoms will go away. Get medical help at once. Call your local emergency services (911 in [place] Do not drive yourself to the hospital.  This information is not intended to replace advice given to you by your health care provider. Make sure you discuss any questions you have with your health care provider.  Document Released: 01/25/2006 Document Revised: 05/25/2017 Document Reviewed: 06/20/2014  [employer]  Interactive Patient Education © 2017 [employer]  BMI for Adults  Body mass index (BMI) is a number that is calculated from a person's weight and height. In most adults, the number is used to find how much of an adult's weight is made up of fat. BMI is not as accurate as a direct measure of body fat.  How is BMI calculated?  BMI is calculated by dividing weight in kilograms by height in meters squared. It can also be calculated by dividing weight in pounds by height in inches squared, then multiplying the resulting number by 703. Charts are available to help you find your BMI quickly and easily without doing this calculation.  How is BMI interpreted?  Health care professionals use BMI charts to identify whether an adult is underweight, at a normal weight, or overweight based on the following guidelines:  · Underweight: BMI less than 18.5.  · Normal weight: BMI between 18.5 and 24.9.  · Overweight: BMI between 25 and 29.9.  · Obese: BMI of 30 and above.  BMI is usually interpreted the same for males and females.  Weight includes both fat and muscle, so someone with a muscular build, such as an athlete, may have a BMI that is higher than 24.9. In cases like these, BMI may not accurately depict body fat. To determine if excess body fat is the cause of a BMI of 25 or higher, further assessments may need to be done by a health care provider.  Why is BMI a useful tool?  BMI is used to identify a possible weight problem that may be related to a medical problem or may increase the risk for medical problems. BMI can also be used to promote changes to reach a healthy weight.  This information is not intended to replace advice given to you by your health care provider. Make sure you discuss any questions you have with your health care provider.  Document Released: 08/29/2005 Document Revised: 04/27/2017 Document Reviewed: 05/15/2015  [employer] Interactive Patient Education © 2017 [employer]

## 2018-02-22 NOTE — PROGRESS NOTES
Subjective     Chief Complaint   Patient presents with   • Stroke         Elliot Palma is a 77 y.o. male right handed retiree, . He is here today for follow up for stroke. He was last seen 8/2017. Since then he as done well and denies new stroke symptoms.He denies unilateral weakness/paresthesia, facial weakness, slurred speech, or sudden vision changes. He has no lasting effects of his stroke. He states he runs aobut 1 1/2 miles daily. Patient found to have AFIB at time of stroke 11/2016 and does take Pradaxa. He denies BRBPR. He continues with statin and denies myalgias. He had labs done by PCP yesterday and is waiting for results. Patient has no new complaints. At time of stroke CTA Head/neck showed nonocclusive internal carotid disease but did show  diminutive appearance of the left anterior cerebral artery as well as an inferior/anterior branch of the left MCA from the  bifurcation.       HPI Comments: Patient woke on the morning of 11/7/16 with slurred speech and left facial droop. He was outside the window for TPA. He was newly diagnosed with AFIB and at that time was not taking an antocoagulant.     Stroke   This is a chronic (11/2016) problem. The current episode started more than 1 year ago (right frontal stroke). The problem has been resolved. Pertinent negatives include no arthralgias, chest pain, fatigue, fever, headaches, nausea, sore throat, vomiting or weakness. Associated symptoms comments: Left lower facial droop, slurred speech, and drooling. This has improved and with mild flattening of nasolabial crease and patient does report some coughing with liquids.. Exacerbated by: was new diagnosis of afib and not taking anticoagulant. Treatments tried: now taking Pradaxa. The treatment provided significant relief.        Current Outpatient Prescriptions   Medication Sig Dispense Refill   • aspirin 81 MG chewable tablet Chew 81 mg Daily.     • atorvastatin (LIPITOR) 10 MG tablet Take 1  tablet by mouth Every Night. 30 tablet 5   • brimonidine (ALPHAGAN) 0.2 % ophthalmic solution INSTILL ONE DROP INTO EACH EYE BID  0   • Dabigatran Etexilate Mesylate (PRADAXA PO) Take 150 mg by mouth 2 (Two) Times a Day.     • doxycycline (VIBRAMYCIN) 100 MG capsule Take 100 mg by mouth 2 (Two) Times a Day.     • latanoprost (XALATAN) 0.005 % ophthalmic solution INSTILL ONE DROP INTO EACH EYE QHS  0   • lisinopril-hydrochlorothiazide (PRINZIDE,ZESTORETIC) 20-12.5 MG per tablet Take 1 tablet by mouth Daily.     • Multiple Vitamins-Minerals (MULTIVITAMIN ADULT PO) Take  by mouth.       No current facility-administered medications for this visit.        Past Medical History:   Diagnosis Date   • Arrhythmia    • Atrial fibrillation    • Body mass index (BMI) of 23.0-23.9 in adult    • Cancer     basil cell carcinoma    • Glaucoma    • Hearing loss    • Hypertension    • Multiple actinic keratoses    • Squamous cell cancer of scalp and skin of neck    • Squamous cell carcinoma     metastatic, of left posterior cervical lymph node   • Squamous cell carcinoma     of scalp and lower left extremity   • Stroke    • Wears glasses        Past Surgical History:   Procedure Laterality Date   • CERVICAL LYMPH NODE BIOPSY/EXCISION Left     excision of left posterior cervical lymph node   • COLONOSCOPY      normal   • KNEE ARTHROSCOPY Bilateral    • MOHS SURGERY Right 09/2016    Right Neck and Right Leg   • MOHS SURGERY Right 2016    Ear   • NECK SURGERY     • ORIF ANKLE FRACTURE Left    • OTHER SURGICAL HISTORY  2013    electrodesiccation and curettage of scalp   • OTHER SURGICAL HISTORY  2011    electrodesiccation and curettage of left lower extremity   • PAROTIDECTOMY Left     left superficial parotidectomy/left posterolateral neck dissection/resection of prior scar/ligation of left chle leak   • SKIN GRAFT      righ ear        family history includes Cancer in his mother and sister; No Known Problems in his son; Stroke in his  "father.    Social History   Substance Use Topics   • Smoking status: Never Smoker   • Smokeless tobacco: Never Used      Comment: nonuser   • Alcohol use Yes      Comment: few times a week       Review of Systems   Constitutional: Negative.  Negative for fatigue and fever.   HENT: Negative.  Negative for nosebleeds, postnasal drip, sinus pressure and sore throat.    Eyes: Negative.  Negative for visual disturbance.   Respiratory: Negative.  Negative for chest tightness and shortness of breath.    Cardiovascular: Negative.  Negative for chest pain.   Gastrointestinal: Negative.  Negative for blood in stool, constipation, diarrhea, nausea and vomiting.   Endocrine: Negative.    Genitourinary: Negative.  Negative for dysuria, frequency and hematuria.   Musculoskeletal: Negative for arthralgias, back pain and gait problem.   Skin: Negative.    Allergic/Immunologic: Negative.    Neurological: Negative.  Negative for speech difficulty, weakness, light-headedness and headaches.   Hematological: Negative.  Negative for adenopathy.   Psychiatric/Behavioral: Negative.  Negative for agitation, confusion and hallucinations.   All other systems reviewed and are negative.      Objective     /78  Pulse 60  Ht 182.9 cm (72\")  Wt 69.9 kg (154 lb)  BMI 20.89 kg/m2, Body mass index is 20.89 kg/(m^2).    Physical Exam   Constitutional: He is oriented to person, place, and time. Vital signs are normal. He appears well-developed and well-nourished.   HENT:   Head: Normocephalic and atraumatic.   Right Ear: Hearing and external ear normal.   Left Ear: Hearing and external ear normal.   Nose: Nose normal.   Mouth/Throat: Uvula is midline, oropharynx is clear and moist and mucous membranes are normal.   Eyes: EOM and lids are normal. Pupils are equal, round, and reactive to light.   Neck: Trachea normal and normal range of motion. Neck supple. Carotid bruit is not present.   Cardiovascular: Normal rate, regular rhythm, S1 normal, " S2 normal and normal heart sounds.    No murmur heard.  Pulmonary/Chest: Effort normal and breath sounds normal.   Abdominal: Soft. Bowel sounds are normal.   Musculoskeletal: Normal range of motion.   Neurological: He is alert and oriented to person, place, and time. He has normal strength and normal reflexes. He displays no tremor. No cranial nerve deficit (mild flattening of nasolabial crease and expression react symmetrically) or sensory deficit. He exhibits normal muscle tone. He displays a negative Romberg sign. Coordination (no ataxia, no tremor) and gait normal. GCS eye subscore is 4. GCS verbal subscore is 5. GCS motor subscore is 6.   Reflex Scores:       Tricep reflexes are 2+ on the right side and 2+ on the left side.       Bicep reflexes are 2+ on the right side and 2+ on the left side.       Brachioradialis reflexes are 2+ on the right side and 2+ on the left side.       Patellar reflexes are 2+ on the right side and 2+ on the left side.       Achilles reflexes are 2+ on the right side and 2+ on the left side.  Awake, alert. No aphasia, no dysarthria    CN II:  Visual fields full.  Pupils equally reactive to light  CN III, IV, VI:  Extraocular Muscles full with no signs of nystagmus  CN V:  Facial sensory is symmetric with no asymetries.  CN VII:  Facial motor symmetric  CN VIII:  Gross hearing intact bilaterally  CN IX:  Palate elevates symmetrically  CN X:  Palate elevates symmetrically  CN XI:  Shoulder shrug symmetric  CN XII:  Tongue is midline on protrusion    Full and symmetric strength bilateral upper and lower extremities.    NIHSS = 0   Skin: Skin is warm and dry.   Psychiatric: He has a normal mood and affect. His speech is normal and behavior is normal. Cognition and memory are normal.   Nursing note and vitals reviewed.           ASSESSMENT/PLAN    Diagnoses and all orders for this visit:    Cerebrovascular accident (CVA) due to thrombosis of left middle cerebral artery  -     US Carotid  Bilateral; Future    Essential hypertension    Paroxysmal atrial flutter    Atrial fibrillation, unspecified type      MEDICAL DECISION MAKIN. Continue with Pradaxa per PCP and cardiology. For anticoagulation for afib  2. Continue with statin managed by PCP for LDL goal less than 70.  3.continue with BP management per PCP for systolic less than 130.  4. Patient is counseled on stroke signs and symptoms using FAST and Time Saved is Brain Saved.  5.Discussed secondary stroke prevention to include a systolic blood pressure goal of less than 140, LDL goal less than 70, and 30-40 minutes of heart rate elevating exercise 3-4 times per week. Continue antiplatelet.   6. Healthy BMI. Patient's BMI is within normal parameters. No follow-up required.  7. Does not use tobacco.   8. Fall Risk Assessment  Fallen in past 6 months: 0--> No  Mental Status: 0--> no mental status change  Mobility: 0--> No mobility issues  Medications: 0--> No meds  Total Fall Risk Score: 2  9. Will check carotid duplex for surveillance.      allergies and all known medications/prescriptions have been reviewed using resources available on this encounter.    Return in about 1 year (around 2019).        PRESTON Navarro

## 2018-02-27 ENCOUNTER — TELEPHONE (OUTPATIENT)
Dept: PODIATRY | Facility: CLINIC | Age: 78
End: 2018-02-27

## 2018-02-27 NOTE — TELEPHONE ENCOUNTER
Left message reminding Mr Palma of his appointment tomorrow at 9 am with Dr Bradley and advised if he had any questions or needed to reschedule to please call the office at [phone].

## 2018-02-28 ENCOUNTER — HOSPITAL ENCOUNTER (OUTPATIENT)
Dept: GENERAL RADIOLOGY | Facility: HOSPITAL | Age: 78
Discharge: HOME | End: 2018-02-28
Attending: PODIATRIST | Admitting: PODIATRIST

## 2018-02-28 ENCOUNTER — OFFICE VISIT (OUTPATIENT)
Dept: PODIATRY | Facility: CLINIC | Age: 78
End: 2018-02-28

## 2018-02-28 VITALS
BODY MASS INDEX: 20.59 KG/M2 | HEIGHT: 72 IN | OXYGEN SATURATION: 100 % | DIASTOLIC BLOOD PRESSURE: 82 MMHG | SYSTOLIC BLOOD PRESSURE: 124 MMHG | WEIGHT: 152 LBS | HEART RATE: 54 BPM

## 2018-02-28 DIAGNOSIS — L84 FOOT CALLUS: ICD-10-CM

## 2018-02-28 DIAGNOSIS — M79.671 FOOT PAIN, BILATERAL: Primary | ICD-10-CM

## 2018-02-28 DIAGNOSIS — M79.672 FOOT PAIN, BILATERAL: Primary | ICD-10-CM

## 2018-02-28 DIAGNOSIS — M79.671 FOOT PAIN, BILATERAL: ICD-10-CM

## 2018-02-28 DIAGNOSIS — Q66.70 CAVUS DEFORMITY OF FOOT: ICD-10-CM

## 2018-02-28 DIAGNOSIS — M79.672 FOOT PAIN, BILATERAL: ICD-10-CM

## 2018-02-28 PROCEDURE — 73630 X-RAY EXAM OF FOOT: CPT

## 2018-02-28 PROCEDURE — 99203 OFFICE O/P NEW LOW 30 MIN: CPT | Performed by: PODIATRIST

## 2018-02-28 PROCEDURE — 11056 PARNG/CUTG B9 HYPRKR LES 2-4: CPT | Performed by: PODIATRIST

## 2018-02-28 NOTE — PROGRESS NOTES
[facility] - PODIATRY    Today's Date: 02/28/18    Patient Name: Elliot Palma  MRN: [id number]  CSN: [id number]  PCP: Michi Bradley MD  Referring Provider: No ref. provider found    SUBJECTIVE     Chief Complaint   Patient presents with   • Right Foot - Pain     Patient is complaining of bilateral foot pain x 3-4 months. 6/10 on a pain scale. He describes the pain as sharp and burning. PCP Dr. Michi Bradley 02/21/2018   • Left Foot - Pain     HPI: Elliot Palma, a 77 y.o.male, comes to clinic as a(n) new patient complaining of foot pain. Patient has h/o AFib, BCC, Glaucoma, Hearing loss, HTN, actinic keratoses, SCC, Stroke. States that for the past 3-4 months he has had increasing pain across the ball of each foot. He has plantar calluses that have formed that are the sites of most of the pain. Admits frequent jogging. Denies injury or trauma. Denies change in activity or shoe gear. Denies redness or drainage from calluses. Admits pain at 6/10 level and described as burning and sharp. Denies previous treatment. Denies any constitutional symptoms. No other pedal complaints at this time.    Past Medical History:   Diagnosis Date   • Arrhythmia    • Atrial fibrillation    • Body mass index (BMI) of 23.0-23.9 in adult    • Cancer     basil cell carcinoma    • Glaucoma    • Hearing loss    • Hypertension    • Multiple actinic keratoses    • Squamous cell cancer of scalp and skin of neck    • Squamous cell carcinoma     metastatic, of left posterior cervical lymph node   • Squamous cell carcinoma     of scalp and lower left extremity   • Stroke    • Wears glasses      Past Surgical History:   Procedure Laterality Date   • CERVICAL LYMPH NODE BIOPSY/EXCISION Left     excision of left posterior cervical lymph node   • COLONOSCOPY      normal   • KNEE ARTHROSCOPY Bilateral    • MOHS SURGERY Right 09/2016    Right Neck and Right Leg   • MOHS SURGERY Right 2016    Ear   • NECK SURGERY     • ORIF ANKLE  FRACTURE Left    • OTHER SURGICAL HISTORY  2013    electrodesiccation and curettage of scalp   • OTHER SURGICAL HISTORY  2011    electrodesiccation and curettage of left lower extremity   • PAROTIDECTOMY Left     left superficial parotidectomy/left posterolateral neck dissection/resection of prior scar/ligation of left chle leak   • SKIN GRAFT      righ ear      Family History   Problem Relation Age of Onset   • Cancer Mother      breast and metastatic, cause of death   • Stroke Father      cause of death   • Cancer Sister      skin   • No Known Problems Son      Social History     Social History   • Marital status:      Spouse name: N/A   • Number of children: 1   • Years of education: N/A     Occupational History   • Not on file.     Social History Main Topics   • Smoking status: Never Smoker   • Smokeless tobacco: Never Used      Comment: nonuser   • Alcohol use Yes      Comment: few times a week   • Drug use: No   • Sexual activity: Defer     Other Topics Concern   • Not on file     Social History Narrative     No Known Allergies  Current Outpatient Prescriptions   Medication Sig Dispense Refill   • aspirin 81 MG chewable tablet Chew 81 mg Daily.     • atorvastatin (LIPITOR) 10 MG tablet Take 1 tablet by mouth Every Night. 30 tablet 5   • brimonidine (ALPHAGAN) 0.2 % ophthalmic solution INSTILL ONE DROP INTO EACH EYE BID  0   • Dabigatran Etexilate Mesylate (PRADAXA PO) Take 150 mg by mouth 2 (Two) Times a Day.     • doxycycline (VIBRAMYCIN) 100 MG capsule Take 100 mg by mouth 2 (Two) Times a Day.     • latanoprost (XALATAN) 0.005 % ophthalmic solution INSTILL ONE DROP INTO EACH EYE QHS  0   • lisinopril-hydrochlorothiazide (PRINZIDE,ZESTORETIC) 20-12.5 MG per tablet Take 1 tablet by mouth Daily.     • Multiple Vitamins-Minerals (MULTIVITAMIN ADULT PO) Take  by mouth.       No current facility-administered medications for this visit.      Review of Systems   Constitutional: Negative for chills and fever.    HENT: Negative for congestion.    Respiratory: Negative for shortness of breath.    Cardiovascular: Negative for chest pain and leg swelling.   Gastrointestinal: Negative for constipation, diarrhea, nausea and vomiting.   Musculoskeletal:        Foot pain   Skin: Negative for wound.   Neurological: Negative for numbness.       OBJECTIVE     Vitals:    02/28/18 0859   BP: 124/82   Pulse: 54   SpO2: 100%       PHYSICAL EXAM  GEN:   A&Ox3, NAD. Pt presents to clinic ambulating without assistance and wearing Athletic shoes.      NEURO:   Protective sensation intact to 10/10 sites Right foot, 10/10 site Left foot using [place] monofilament  Light touch sensation present  No Tinel's or Villeux sign.    VASC:  Skin temperature Warm to Warm proximal to distal timothy  DP pulses 2/4 Right, 2/4 Left  PT pulses 2/4 Right, 2/4 Left  CFT 3 sec timothy  Pedal hair growth present  no edema noted timothy  Varicosities absent timothy    MUSC/SKEL:  Muscle Strength Right foot Dorsiflexors 5/5, Plantarflexors 5/5, Evertors 5/5, Invertors 5/5  Muscle Strength Left foot Dorsiflexors 5/5, Plantarflexors 5/5, Evertors 5/5, Invertors 5/5  ROM of the 1st MTP is full without pain or crepitus  ROM of the MTJ is full without pain or crepitus    ROM of the STJ is full without pain or crepitus    ROM of the ankle joint is full without pain or crepitus    POP of plantar forefoot skin lesions; diffusely across metatarsal heads  Cavus foot type with increased arch height and bow strung extensor tendons  Gait pattern: Normal    DERM:  Pedal nails x10 are within normal limits of length and thickness  Webspaces are Clean, Dry, and Intact  Skin is normal in turgor and texture with no open wounds or sores appreciated.  Thickened HPK noted sub 1st metatarsal and 5th metatarsal head on right foot; sub 1st metatarsal and 4th metatarsal head on left foot      RADIOLOGY/NUCLEAR:  No results found.    LABORATORY/CULTURE RESULTS:      PATHOLOGY RESULTS:        ASSESSMENT/PLAN     Elliot was seen today for pain and pain.    Diagnoses and all orders for this visit:    Foot pain, bilateral  -     XR Foot 3+ View Bilateral; Future    Foot callus    Cavus deformity of foot      Comprehensive lower extremity examination and evaluation was performed.  Discussed findings and treatment plan including risks, benefits, and treatment options with patient in detail. Patient agreed with treatment plan.  After verbal consent obtained, calluses x4 pared utilizing dermal curette and/or scalpel without incidence  Patient may maintain nails and calluses at home utilizing emery board or pumice stone between visits as needed   Rx: CMO with offloading  An After Visit Summary was printed and given to the patient at discharge, including (if requested) any available informative/educational handouts regarding diagnosis, treatment, or medications. All questions were answered to patient/family satisfaction. Should symptoms fail to improve or worsen they agree to call or return to clinic or to go to the Emergency Department. Discussed the importance of following up with any needed screening tests/labs/specialist appointments and any requested follow-up recommended by me today. Importance of maintaining follow-up discussed and patient accepts that missed appointments can delay diagnosis and potentially lead to worsening of conditions.  Return if symptoms worsen or fail to improve., or sooner if acute issues arise.    Lab Frequency Next Occurrence   US Carotid Bilateral Once 2/27/2018   XR Foot 3+ View Bilateral Once 3/5/2018       This document has been electronically signed by Landen Bradley DPM on February 28, 2018 9:23 AM

## 2018-02-28 NOTE — PATIENT INSTRUCTIONS
Corns and Calluses  Corns are small areas of thickened skin that occur on the top, sides, or tip of a toe. They contain a cone-shaped core with a point that can press on a nerve below. This causes pain. Calluses are areas of thickened skin that can occur anywhere on the body including hands, fingers, palms, soles of the feet, and heels. Calluses are usually larger than corns.  What are the causes?  Corns and calluses are caused by rubbing (friction) or pressure, such as from shoes that are too tight or do not fit properly.  What increases the risk?  Corns are more likely to develop in people who have toe deformities, such as hammer toes.  Since calluses can occur with friction to any area of the skin, calluses are more likely to develop in people who:  · Work with their hands.  · Wear shoes that fit poorly, shoes that are too tight, or shoes that are high-heeled.  · Have toes deformities.  What are the signs or symptoms?  Symptoms of a corn or callus include:  · A hard growth on the skin.  · Pain or tenderness under the skin.  · Redness and swelling.  · Increased discomfort while wearing tight-fitting shoes.  How is this diagnosed?  Corns and calluses may be diagnosed with a medical history and physical exam.  How is this treated?  Corns and calluses may be treated with:  · Removing the cause of the friction or pressure. This may include:  ¨ Changing your shoes.  ¨ Wearing shoe inserts (orthotics) or other protective layers in your shoes, such as a corn pad.  ¨ Wearing gloves.  · Medicines to help soften skin in the hardened, thickened areas.  · Reducing the size of the corn or callus by removing the dead layers of skin.  · Antibiotic medicines to treat infection.  · Surgery, if a toe deformity is the cause.  Follow these instructions at home:  · Take medicines only as directed by your health care provider.  · If you were prescribed an antibiotic, finish all of it even if you start to feel better.  · Wear shoes that  fit well. Avoid wearing high-heeled shoes and shoes that are too tight or too loose.  · Wear any padding, protective layers, gloves, or orthotics as directed by your health care provider.  · Soak your hands or feet and then use a file or pumice stone to soften your corn or callus. Do this as directed by your health care provider.  · Check your corn or callus every day for signs of infection. Watch for:  ¨ Redness, swelling, or pain.  ¨ Fluid, blood, or pus.  Contact a health care provider if:  · Your symptoms do not improve with treatment.  · You have increased redness, swelling, or pain at the site of your corn or callus.  · You have fluid, blood, or pus coming from your corn or callus.  · You have new symptoms.  This information is not intended to replace advice given to you by your health care provider. Make sure you discuss any questions you have with your health care provider.  Document Released: 09/23/2005 Document Revised: 07/07/2017 Document Reviewed: 12/14/2015  [employer] Interactive Patient Education © 2017 [employer]

## 2018-03-01 NOTE — PROGRESS NOTES
Patient was advised of xray results and verbalized understanding. He stated that all questions were answered. Patient was advised to call our office if he had any further questions or concerns.

## 2018-03-05 ENCOUNTER — HOSPITAL ENCOUNTER (OUTPATIENT)
Dept: ULTRASOUND IMAGING | Facility: HOSPITAL | Age: 78
Discharge: HOME | End: 2018-03-05
Admitting: CLINICAL NURSE SPECIALIST

## 2018-03-05 DIAGNOSIS — I63.312 CEREBROVASCULAR ACCIDENT (CVA) DUE TO THROMBOSIS OF LEFT MIDDLE CEREBRAL ARTERY (HCC): ICD-10-CM

## 2018-03-05 PROCEDURE — 93880 EXTRACRANIAL BILAT STUDY: CPT | Performed by: SURGERY

## 2018-03-05 PROCEDURE — 93880 EXTRACRANIAL BILAT STUDY: CPT

## 2018-06-21 LAB
CHOLESTEROL, TOTAL: 135 MG/DL (ref 160–199)
HDLC SERPL-MCNC: 67 MG/DL (ref 55–121)
LDL CHOLESTEROL CALCULATED: 58 MG/DL
TRIGL SERPL-MCNC: 48 MG/DL (ref 0–149)

## 2018-08-10 ENCOUNTER — OFFICE VISIT (OUTPATIENT)
Dept: CARDIOLOGY | Facility: CLINIC | Age: 78
End: 2018-08-10

## 2018-08-10 VITALS
SYSTOLIC BLOOD PRESSURE: 120 MMHG | WEIGHT: 148 LBS | HEIGHT: 72 IN | BODY MASS INDEX: 20.05 KG/M2 | HEART RATE: 87 BPM | OXYGEN SATURATION: 98 % | DIASTOLIC BLOOD PRESSURE: 80 MMHG

## 2018-08-10 DIAGNOSIS — I48.19 PERSISTENT ATRIAL FIBRILLATION (HCC): Primary | ICD-10-CM

## 2018-08-10 DIAGNOSIS — E78.5 DYSLIPIDEMIA: ICD-10-CM

## 2018-08-10 DIAGNOSIS — I10 ESSENTIAL HYPERTENSION: ICD-10-CM

## 2018-08-10 PROCEDURE — 99213 OFFICE O/P EST LOW 20 MIN: CPT | Performed by: INTERNAL MEDICINE

## 2018-08-10 NOTE — PROGRESS NOTES
Reason for Visit: cardiovascular follow up.    HPI:  Elliot Palma is a 78 y.o. male is here today for 6 month follow-up.  He has been doing well for the most part.  Recently had surgery to remove a mole and squamous cell skin cancer.  He denies any chest pain, palpitations, dizziness, syncope, PND, or orthopnea.  He has done well with the Pradaxa and has not had any bleeding problems.  He has continued to take an aspirin.      Previous Cardiac Testing and Procedures:  - Echo (11/09/2016) EF 55-60%, left atrium mildly dilated, normal RV size and function, mild MR and TR    Patient Active Problem List   Diagnosis   • Metastatic squamous cell carcinoma (CMS/HCC)   • Cerebrovascular accident (CVA) due to thrombosis of left middle cerebral artery (CMS/HCC)   • Atrial fibrillation (CMS/HCC)   • Dyslipidemia   • Paroxysmal atrial flutter (CMS/HCC)   • Dysphagia   • Essential hypertension   • Cavus deformity of foot       Social History   Substance Use Topics   • Smoking status: Never Smoker   • Smokeless tobacco: Never Used      Comment: nonuser   • Alcohol use Yes      Comment: few times a week       Family History   Problem Relation Age of Onset   • Cancer Mother         breast and metastatic, cause of death   • Stroke Father         cause of death   • Cancer Sister         skin   • No Known Problems Son        The following portions of the patient's history were reviewed and updated as appropriate: allergies, current medications, past family history, past medical history, past social history, past surgical history and problem list.      Current Outpatient Prescriptions:   •  atorvastatin (LIPITOR) 10 MG tablet, Take 1 tablet by mouth Every Night., Disp: 30 tablet, Rfl: 5  •  brimonidine (ALPHAGAN) 0.2 % ophthalmic solution, INSTILL ONE DROP INTO EACH EYE BID, Disp: , Rfl: 0  •  Dabigatran Etexilate Mesylate (PRADAXA PO), Take 150 mg by mouth 2 (Two) Times a Day., Disp: , Rfl:   •  doxycycline (VIBRAMYCIN) 100 MG  "capsule, Take 100 mg by mouth 2 (Two) Times a Day., Disp: , Rfl:   •  latanoprost (XALATAN) 0.005 % ophthalmic solution, INSTILL ONE DROP INTO EACH EYE QHS, Disp: , Rfl: 0  •  lisinopril-hydrochlorothiazide (PRINZIDE,ZESTORETIC) 20-12.5 MG per tablet, Take 1 tablet by mouth Daily., Disp: , Rfl:   •  Multiple Vitamins-Minerals (MULTIVITAMIN ADULT PO), Take  by mouth., Disp: , Rfl:     Review of Systems   Constitution: Negative for chills and fever.   Cardiovascular: Negative for chest pain and paroxysmal nocturnal dyspnea.   Respiratory: Negative for cough and shortness of breath.    Skin: Negative for rash.   Gastrointestinal: Negative for abdominal pain and heartburn.   Neurological: Negative for dizziness and numbness.       Objective   /80 (BP Location: Left arm, Patient Position: Sitting, Cuff Size: Adult)   Pulse 87   Ht 182.9 cm (72.01\")   Wt 67.1 kg (148 lb)   SpO2 98%   BMI 20.07 kg/m²   Physical Exam   Constitutional: He is oriented to person, place, and time. He appears well-developed and well-nourished.   HENT:   Head: Normocephalic and atraumatic.   Cardiovascular: Normal rate and normal heart sounds.  An irregularly irregular rhythm present.   No murmur heard.  Pulmonary/Chest: Effort normal and breath sounds normal.   Musculoskeletal: He exhibits no edema.   Neurological: He is alert and oriented to person, place, and time.   Skin: Skin is warm and dry.   Psychiatric: He has a normal mood and affect.     Procedures      ICD-10-CM ICD-9-CM   1. Persistent atrial fibrillation (CMS/HCC) I48.1 427.31   2. Essential hypertension I10 401.9   3. Dyslipidemia E78.5 272.4         Assessment/Plan:  1. Paroxysmal atrial flutter: Remains asymptomatic.  Continue anticoagulation with Pradaxa. Stop aspirin.  Heart rate remains normal without any AV krystina blocking drugs. Discussed possible cardioversion and there is no strong indication for cardioversion and he has minimal to no symptoms.      2. " Hypertension: Blood pressure remains well controlled on current therapy.      3. Dyslipidemia: Continue atorvastatin.

## 2018-11-14 LAB
ALBUMIN SERPL-MCNC: 4.5 G/DL (ref 3.5–5.2)
ALP BLD-CCNC: 60 U/L (ref 40–130)
ALT SERPL-CCNC: 21 U/L (ref 5–41)
ANION GAP SERPL CALCULATED.3IONS-SCNC: 12 MMOL/L (ref 7–19)
AST SERPL-CCNC: 27 U/L (ref 5–40)
BILIRUB SERPL-MCNC: 1.2 MG/DL (ref 0.2–1.2)
BUN BLDV-MCNC: 16 MG/DL (ref 8–23)
CALCIUM SERPL-MCNC: 9.5 MG/DL (ref 8.8–10.2)
CHLORIDE BLD-SCNC: 103 MMOL/L (ref 98–111)
CHOLESTEROL, TOTAL: 142 MG/DL (ref 160–199)
CO2: 26 MMOL/L (ref 22–29)
CREAT SERPL-MCNC: 0.9 MG/DL (ref 0.5–1.2)
GFR NON-AFRICAN AMERICAN: >60
GLUCOSE BLD-MCNC: 107 MG/DL (ref 74–109)
HDLC SERPL-MCNC: 68 MG/DL (ref 55–121)
LDL CHOLESTEROL CALCULATED: 63 MG/DL
POTASSIUM SERPL-SCNC: 4.5 MMOL/L (ref 3.5–5)
SODIUM BLD-SCNC: 141 MMOL/L (ref 136–145)
TOTAL PROTEIN: 7.3 G/DL (ref 6.6–8.7)
TRIGL SERPL-MCNC: 57 MG/DL (ref 0–149)

## 2019-02-11 ENCOUNTER — OFFICE VISIT (OUTPATIENT)
Dept: CARDIOLOGY | Facility: CLINIC | Age: 79
End: 2019-02-11

## 2019-02-11 VITALS
HEART RATE: 56 BPM | OXYGEN SATURATION: 98 % | BODY MASS INDEX: 21.4 KG/M2 | SYSTOLIC BLOOD PRESSURE: 138 MMHG | WEIGHT: 158 LBS | HEIGHT: 72 IN | DIASTOLIC BLOOD PRESSURE: 82 MMHG

## 2019-02-11 DIAGNOSIS — I10 ESSENTIAL HYPERTENSION: ICD-10-CM

## 2019-02-11 DIAGNOSIS — I48.19 PERSISTENT ATRIAL FIBRILLATION (HCC): Primary | ICD-10-CM

## 2019-02-11 DIAGNOSIS — E78.5 DYSLIPIDEMIA: ICD-10-CM

## 2019-02-11 PROCEDURE — 99213 OFFICE O/P EST LOW 20 MIN: CPT | Performed by: INTERNAL MEDICINE

## 2019-02-11 PROCEDURE — 93000 ELECTROCARDIOGRAM COMPLETE: CPT | Performed by: INTERNAL MEDICINE

## 2019-02-11 NOTE — PROGRESS NOTES
"  Reason for Visit: cardiovascular follow up.    HPI:  Elliot Palma is a 78 y.o. male {Specialty - why patient here?:[id number]}    Previous Cardiac Testing and Procedures:  -     Patient Active Problem List   Diagnosis   • Metastatic squamous cell carcinoma (CMS/HCC)   • Cerebrovascular accident (CVA) due to thrombosis of left middle cerebral artery (CMS/HCC)   • Atrial fibrillation (CMS/HCC)   • Dyslipidemia   • Paroxysmal atrial flutter (CMS/HCC)   • Dysphagia   • Essential hypertension   • Cavus deformity of foot       Social History     Tobacco Use   • Smoking status: Never Smoker   • Smokeless tobacco: Never Used   • Tobacco comment: nonuser   Substance Use Topics   • Alcohol use: Yes     Comment: few times a week   • Drug use: No       Family History   Problem Relation Age of Onset   • Cancer Mother         breast and metastatic, cause of death   • Stroke Father         cause of death   • Cancer Sister         skin   • No Known Problems Son        {Common H&P Review Areas:93417}      Current Outpatient Medications:   •  atorvastatin (LIPITOR) 10 MG tablet, Take 1 tablet by mouth Every Night., Disp: 30 tablet, Rfl: 5  •  brimonidine (ALPHAGAN) 0.2 % ophthalmic solution, INSTILL ONE DROP INTO EACH EYE BID, Disp: , Rfl: 0  •  Dabigatran Etexilate Mesylate (PRADAXA PO), Take 150 mg by mouth 2 (Two) Times a Day., Disp: , Rfl:   •  doxycycline (VIBRAMYCIN) 100 MG capsule, Take 100 mg by mouth 2 (Two) Times a Day., Disp: , Rfl:   •  latanoprost (XALATAN) 0.005 % ophthalmic solution, INSTILL ONE DROP INTO EACH EYE QHS, Disp: , Rfl: 0  •  lisinopril-hydrochlorothiazide (PRINZIDE,ZESTORETIC) 20-12.5 MG per tablet, Take 1 tablet by mouth Daily., Disp: , Rfl:   •  Multiple Vitamins-Minerals (MULTIVITAMIN ADULT PO), Take  by mouth., Disp: , Rfl:     ROS    Objective   Ht 182.9 cm (72.01\")   BMI 20.07 kg/m²   Physical Exam  Procedures    No diagnosis found.      Assessment/Plan:  1.   "

## 2019-02-11 NOTE — PROGRESS NOTES
Reason for Visit: cardiovascular follow up.    HPI:  Elliot Plama is a 78 y.o. male is here today for 1 year follow-up.  He has been doing well and not having any issues or complaints.  He denies any palpitations, tachycardia, chest pain, dizziness, syncope, PND, or orthopnea.  He has not had any problem with the atrial fibrillation or atrial flutter.  He does not even know he is in it.  He is not having any recurrence of strokelike symptoms.  He has not had any bleeding on the Pradaxa.  He checks his blood pressure at home and that is well controlled.  Systolic blood pressure is always in the 130s or less.    Previous Cardiac Testing and Procedures:  - Echo (11/09/2016) EF 55-60%, left atrium mildly dilated, normal RV size and function, mild MR and TR  - Carotid ultrasound (03/05/2018) less than 50% bilaterally    Patient Active Problem List   Diagnosis   • Metastatic squamous cell carcinoma (CMS/HCC)   • Cerebrovascular accident (CVA) due to thrombosis of left middle cerebral artery (CMS/HCC)   • Atrial fibrillation (CMS/HCC)   • Dyslipidemia   • Paroxysmal atrial flutter (CMS/HCC)   • Dysphagia   • Essential hypertension   • Cavus deformity of foot       Social History     Tobacco Use   • Smoking status: Never Smoker   • Smokeless tobacco: Never Used   • Tobacco comment: nonuser   Substance Use Topics   • Alcohol use: Yes     Comment: few times a week   • Drug use: No       Family History   Problem Relation Age of Onset   • Cancer Mother         breast and metastatic, cause of death   • Stroke Father         cause of death   • Cancer Sister         skin   • No Known Problems Son        The following portions of the patient's history were reviewed and updated as appropriate: allergies, current medications, past family history, past medical history, past social history, past surgical history and problem list.      Current Outpatient Medications:   •  atorvastatin (LIPITOR) 10 MG tablet, Take 1 tablet by mouth  "Every Night., Disp: 30 tablet, Rfl: 5  •  brimonidine (ALPHAGAN) 0.2 % ophthalmic solution, INSTILL ONE DROP INTO EACH EYE BID, Disp: , Rfl: 0  •  Dabigatran Etexilate Mesylate (PRADAXA PO), Take 150 mg by mouth 2 (Two) Times a Day., Disp: , Rfl:   •  doxycycline (VIBRAMYCIN) 100 MG capsule, Take 100 mg by mouth 2 (Two) Times a Day., Disp: , Rfl:   •  latanoprost (XALATAN) 0.005 % ophthalmic solution, INSTILL ONE DROP INTO EACH EYE QHS, Disp: , Rfl: 0  •  lisinopril-hydrochlorothiazide (PRINZIDE,ZESTORETIC) 20-12.5 MG per tablet, Take 1 tablet by mouth Daily., Disp: , Rfl:   •  Multiple Vitamins-Minerals (MULTIVITAMIN ADULT PO), Take  by mouth., Disp: , Rfl:     Review of Systems   Constitution: Negative for chills and fever.   Cardiovascular: Negative for chest pain and paroxysmal nocturnal dyspnea.   Respiratory: Negative for cough and shortness of breath.    Skin: Negative for rash.   Gastrointestinal: Negative for abdominal pain and heartburn.   Neurological: Negative for dizziness and numbness.       Objective   /82 (BP Location: Left arm, Patient Position: Sitting, Cuff Size: Adult)   Pulse 56   Ht 182.9 cm (72.01\")   Wt 71.7 kg (158 lb)   SpO2 98%   BMI 21.42 kg/m²   Physical Exam   Constitutional: He is oriented to person, place, and time. He appears well-developed and well-nourished.   HENT:   Head: Normocephalic and atraumatic.   Cardiovascular: Normal heart sounds. An irregularly irregular rhythm present. Bradycardia present.   No murmur heard.  Pulmonary/Chest: Effort normal and breath sounds normal.   Musculoskeletal: He exhibits no edema.   Neurological: He is alert and oriented to person, place, and time.   Skin: Skin is warm and dry.   Psychiatric: He has a normal mood and affect.       ECG 12 Lead  Date/Time: 2/11/2019 9:09 AM  Performed by: Jomar Ingram MD  Authorized by: Jomar Ingram MD   Comparison: compared with previous ECG from 7/17/2017  Similar to previous ECG  Rhythm: " atrial fibrillation  Rate: bradycardic              ICD-10-CM ICD-9-CM   1. Persistent atrial fibrillation (CMS/HCC) I48.1 427.31   2. Essential hypertension I10 401.9   3. Dyslipidemia E78.5 272.4         Assessment/Plan:  1. Persistent atrial fibrillation/flutter: Remains asymptomatic.  Continue Pradaxa.     2. Hypertension: Blood pressure remains reasonably well controlled on current therapy.      3. Dyslipidemia: Continue atorvastatin.

## 2019-02-21 ENCOUNTER — OFFICE VISIT (OUTPATIENT)
Dept: NEUROLOGY | Facility: CLINIC | Age: 79
End: 2019-02-21

## 2019-02-21 VITALS
HEIGHT: 72 IN | WEIGHT: 156 LBS | DIASTOLIC BLOOD PRESSURE: 80 MMHG | BODY MASS INDEX: 21.13 KG/M2 | HEART RATE: 72 BPM | SYSTOLIC BLOOD PRESSURE: 140 MMHG | RESPIRATION RATE: 18 BRPM

## 2019-02-21 DIAGNOSIS — I10 ESSENTIAL HYPERTENSION: ICD-10-CM

## 2019-02-21 DIAGNOSIS — I48.92 PAROXYSMAL ATRIAL FLUTTER (HCC): ICD-10-CM

## 2019-02-21 DIAGNOSIS — I63.312 CEREBROVASCULAR ACCIDENT (CVA) DUE TO THROMBOSIS OF LEFT MIDDLE CEREBRAL ARTERY (HCC): Primary | ICD-10-CM

## 2019-02-21 PROCEDURE — 99213 OFFICE O/P EST LOW 20 MIN: CPT | Performed by: CLINICAL NURSE SPECIALIST

## 2019-02-21 NOTE — PROGRESS NOTES
Subjective     Chief Complaint   Patient presents with   • Stroke         Elliot Palma is a 78 y.o. male right handed retiree, . He is here today for follow up for stroke. He was last seen 2/2018.  He is by himself. He is ambulating unassisted. He continues to run daily 1-1.5 miles most days of the seek. Since then he as done well and denies new stroke symptoms.He denies unilateral weakness/paresthesia, facial weakness, slurred speech, or sudden vision changes. He has no lasting effects of his stroke. Patient found to have AFIB at time of stroke 11/2016 and does take Pradaxa 150 mg BID managed by cardiology. He denies BRBPR. He continues with statin and denies myalgias. Patient has no new complaints. At time of stroke CTA Head/neck showed nonocclusive internal carotid disease but did show  diminutive appearance of the left anterior cerebral artery as well as an inferior/anterior branch of the left MCA from the bifurcation. He did have follow up CUS 3/2018 that showed less than 50% stenosis bilateral. He denies falls.         Patient woke on the morning of 11/7/16 with slurred speech and left facial droop. He was outside the window for TPA. He was newly diagnosed with AFIB and at that time was not taking an antocoagulant.       Stroke   This is a chronic (11/2016) problem. The current episode started more than 1 year ago (right frontal stroke). The problem has been resolved. Pertinent negatives include no arthralgias, chest pain, fatigue, fever, headaches, nausea, sore throat, vomiting or weakness. Associated symptoms comments: Left lower facial droop, slurred speech, and drooling. This has improved and with mild flattening of nasolabial crease and patient does report some coughing with liquids.. Exacerbated by: was new diagnosis of afib and not taking anticoagulant. Treatments tried: now taking Pradaxa. The treatment provided significant relief.        Current Outpatient Medications   Medication  Sig Dispense Refill   • atorvastatin (LIPITOR) 10 MG tablet Take 1 tablet by mouth Every Night. 30 tablet 5   • brimonidine (ALPHAGAN) 0.2 % ophthalmic solution INSTILL ONE DROP INTO EACH EYE BID  0   • Dabigatran Etexilate Mesylate (PRADAXA PO) Take 150 mg by mouth 2 (Two) Times a Day.     • doxycycline (VIBRAMYCIN) 100 MG capsule Take 100 mg by mouth 2 (Two) Times a Day.     • latanoprost (XALATAN) 0.005 % ophthalmic solution INSTILL ONE DROP INTO EACH EYE QHS  0   • lisinopril-hydrochlorothiazide (PRINZIDE,ZESTORETIC) 20-12.5 MG per tablet Take 1 tablet by mouth Daily.     • Multiple Vitamins-Minerals (MULTIVITAMIN ADULT PO) Take  by mouth.       No current facility-administered medications for this visit.        Past Medical History:   Diagnosis Date   • Arrhythmia    • Atrial fibrillation (CMS/HCC)    • Body mass index (BMI) of 23.0-23.9 in adult    • Cancer (CMS/HCC)     basil cell carcinoma    • Glaucoma    • Hearing loss    • Hypertension    • Multiple actinic keratoses    • Squamous cell cancer of scalp and skin of neck    • Squamous cell carcinoma     metastatic, of left posterior cervical lymph node   • Squamous cell carcinoma     of scalp and lower left extremity   • Stroke (CMS/HCC)    • Wears glasses        Past Surgical History:   Procedure Laterality Date   • CERVICAL LYMPH NODE BIOPSY/EXCISION Left     excision of left posterior cervical lymph node   • COLONOSCOPY      normal   • KNEE ARTHROSCOPY Bilateral    • MOHS SURGERY Right 09/2016    Right Neck and Right Leg   • MOHS SURGERY Right 2016    Ear   • NECK SURGERY     • ORIF ANKLE FRACTURE Left    • OTHER SURGICAL HISTORY  2013    electrodesiccation and curettage of scalp   • OTHER SURGICAL HISTORY  2011    electrodesiccation and curettage of left lower extremity   • PAROTIDECTOMY Left     left superficial parotidectomy/left posterolateral neck dissection/resection of prior scar/ligation of left chle leak   • SKIN GRAFT      [facility]  "history includes Cancer in his mother and sister; No Known Problems in his son; Stroke in his father.    Social History     Tobacco Use   • Smoking status: Never Smoker   • Smokeless tobacco: Never Used   • Tobacco comment: nonuser   Substance Use Topics   • Alcohol use: Yes     Comment: few times a week   • Drug use: No       Review of Systems   Constitutional: Negative.  Negative for fatigue and fever.   HENT: Negative.  Negative for nosebleeds, postnasal drip, sinus pressure and sore throat.    Eyes: Negative.  Negative for visual disturbance.   Respiratory: Negative.  Negative for chest tightness and shortness of breath.    Cardiovascular: Negative.  Negative for chest pain.   Gastrointestinal: Negative.  Negative for blood in stool, constipation, diarrhea, nausea and vomiting.   Endocrine: Negative.  Negative for cold intolerance and heat intolerance.   Genitourinary: Positive for frequency. Negative for dysuria and hematuria.   Musculoskeletal: Negative for arthralgias, back pain and gait problem.   Skin: Negative.    Allergic/Immunologic: Negative.    Neurological: Negative.  Negative for speech difficulty, weakness, light-headedness and headaches.   Hematological: Negative.  Negative for adenopathy.   Psychiatric/Behavioral: Negative.  Negative for agitation, confusion and hallucinations.   All other systems reviewed and are negative.      Objective     /80 (BP Location: Left arm, Patient Position: Sitting)   Pulse 72   Resp 18   Ht 182.9 cm (72\")   Wt 70.8 kg (156 lb)   BMI 21.16 kg/m² , Body mass index is 21.16 kg/m².    Physical Exam   Constitutional: He is oriented to person, place, and time. Vital signs are normal. He appears well-developed and well-nourished.   HENT:   Head: Normocephalic and atraumatic.   Right Ear: Hearing and external ear normal.   Left Ear: Hearing and external ear normal.   Nose: Nose normal.   Mouth/Throat: Uvula is midline, oropharynx is clear and moist and mucous " membranes are normal.   Eyes: EOM and lids are normal. Pupils are equal, round, and reactive to light. Right eye exhibits normal extraocular motion and no nystagmus. Left eye exhibits normal extraocular motion and no nystagmus. Right pupil is round and reactive. Left pupil is round and reactive. Pupils are equal.   Neck: Trachea normal and normal range of motion. Neck supple. Carotid bruit is not present.   Cardiovascular: Normal rate, regular rhythm, S1 normal, S2 normal and normal heart sounds.   No murmur heard.  Pulmonary/Chest: Effort normal and breath sounds normal. He has no decreased breath sounds. He has no rhonchi.   Abdominal: Soft. Bowel sounds are normal.   Musculoskeletal: Normal range of motion.   Neurological: He is alert and oriented to person, place, and time. He has normal strength and normal reflexes. He displays no tremor. No cranial nerve deficit (mild flattening of nasolabial crease and expression react symmetrically) or sensory deficit. He exhibits normal muscle tone. He displays a negative Romberg sign. Coordination (no ataxia, no tremor) and gait normal.   Reflex Scores:       Tricep reflexes are 2+ on the right side and 2+ on the left side.       Bicep reflexes are 2+ on the right side and 2+ on the left side.       Brachioradialis reflexes are 2+ on the right side and 2+ on the left side.       Patellar reflexes are 2+ on the right side and 2+ on the left side.       Achilles reflexes are 2+ on the right side and 2+ on the left side.  Awake, alert. No aphasia, no dysarthria    CN II:  Visual fields full.  Pupils equally reactive to light  CN III, IV, VI:  Extraocular Muscles full with no signs of nystagmus  CN V:  Facial sensory is symmetric with no asymetries.  CN VII:  Facial motor symmetric  CN VIII:  Gross hearing intact bilaterally  CN IX:  Palate elevates symmetrically  CN X:  Palate elevates symmetrically  CN XI:  Shoulder shrug symmetric  CN XII:  Tongue is midline on  protrusion    Full and symmetric strength bilateral upper and lower extremities.    NIHSS = 0   Skin: Skin is warm and dry.   Psychiatric: He has a normal mood and affect. His speech is normal and behavior is normal. Cognition and memory are normal.   Nursing note and vitals reviewed.      Carotid duplex 3/2018:  IMPRESSION:  Impression:  1. There is less than 50% stenosis of the right internal carotid artery.  2. There is less than 50% stenosis of the left internal carotid artery.  3. Antegrade flow is demonstrated in bilateral vertebral arteries.      ASSESSMENT/PLAN    Diagnoses and all orders for this visit:    Cerebrovascular accident (CVA) due to thrombosis of left middle cerebral artery (CMS/HCC)    Paroxysmal atrial flutter (CMS/HCC)    Essential hypertension      MEDICAL DECISION MAKIN. Continue with Pradaxa 150 mg BID per PCP and cardiology. For anticoagulation for afib and secondary stroke prevention.  2. Continue with statin managed by PCP for LDL goal less than 70.   3.continue with BP management per PCP for systolic less than 130.  4. Patient is counseled on stroke signs and symptoms using FAST and Time Saved is Brain Saved.  5.Discussed secondary stroke prevention to include a systolic blood pressure goal of less than 140, LDL goal less than 70, and 30-40 minutes of heart rate elevating exercise 3-4 times per week. Continue antiplatelet.   6. Patient's Body mass index is 21.16 kg/m². BMI is within normal parameters. No follow-up required..  7. Does not use tobacco.  8. Fall Risk Assessment  Fallen in past 6 months: 0--> No  Mental Status: 0--> no mental status change  Mobility: 0--> No mobility issues  Medications: 0--> No meds  Total Fall Risk Score: 2      HPI and ROS reviewed and updated.       allergies and all known medications/prescriptions have been reviewed using resources available on this encounter.    Return if symptoms worsen or fail to improve.        Gita Blanca, APRN

## 2019-08-12 ENCOUNTER — OFFICE VISIT (OUTPATIENT)
Dept: GASTROENTEROLOGY | Facility: CLINIC | Age: 79
End: 2019-08-12

## 2019-08-12 ENCOUNTER — TELEPHONE (OUTPATIENT)
Dept: GASTROENTEROLOGY | Facility: CLINIC | Age: 79
End: 2019-08-12

## 2019-08-12 VITALS
DIASTOLIC BLOOD PRESSURE: 82 MMHG | BODY MASS INDEX: 20.45 KG/M2 | TEMPERATURE: 97.8 F | SYSTOLIC BLOOD PRESSURE: 140 MMHG | HEIGHT: 72 IN | OXYGEN SATURATION: 95 % | HEART RATE: 85 BPM | WEIGHT: 151 LBS

## 2019-08-12 DIAGNOSIS — D68.9 COAGULOPATHY (HCC): ICD-10-CM

## 2019-08-12 DIAGNOSIS — Z86.010 HX OF COLONIC POLYP: ICD-10-CM

## 2019-08-12 DIAGNOSIS — K62.5 BRBPR (BRIGHT RED BLOOD PER RECTUM): Primary | ICD-10-CM

## 2019-08-12 DIAGNOSIS — I10 ESSENTIAL HYPERTENSION: ICD-10-CM

## 2019-08-12 PROBLEM — Z86.0100 HX OF COLONIC POLYP: Status: ACTIVE | Noted: 2019-08-12

## 2019-08-12 PROCEDURE — 99214 OFFICE O/P EST MOD 30 MIN: CPT | Performed by: NURSE PRACTITIONER

## 2019-08-12 NOTE — TELEPHONE ENCOUNTER
Please call patient and let him know dr aguilera's recommendations below. Thank you     ----- Message from Jomar Aguilera MD sent at 8/12/2019 12:47 PM CDT -----      It is acceptable to hold his Pradaxa for up to 72 hours prior to the procedure.  He can resume when safe afterwards.  Bridging with Lovenox is not indicated.    ----- Message -----  From: Elyse Hawkins, [place]  Sent: 8/12/2019  11:44 AM  To: Jomar Aguilera MD

## 2019-08-12 NOTE — PROGRESS NOTES
[facility]    Primary Physician Michi Bradley MD    8/12/2019    Elliot Palma   1940      Chief Complaint   Patient presents with   • Colonoscopy   bright red blood per rectum     Subjective     HPI    Elliot Palma is a 79 y.o. male who presents bright red blood per rectum.  This has occurred intermittently over the last 2 to 3 months.  The blood is a small amount noted on tissue.  He admits to history of hemorrhoids.  He was prescribed a cream to apply to hemorrhoids June 2019 and could not tell that that has helped.  No constipation or diarrhea.  No abdominal pain.  No fevers or chills.  No unintentional weight loss.  No black stool.     Last colonoscopy was 8/2014 noting 2 polyps not retrieved, internal hemorrhoids. Recommended recall 5 years.  The patient does  have history of colon polyps. The patient does not have history of colon cancer.   There is no  family history of colon polyps. There is no family history of colon cancer.       He is on pradaxa for afib.  Cardiologist is Dr. Ingram. He has history of cva as well.     Past Medical History:   Diagnosis Date   • Arrhythmia    • Atrial fibrillation (CMS/HCC)    • Body mass index (BMI) of 23.0-23.9 in adult    • Cancer (CMS/HCC)     basil cell carcinoma    • Glaucoma    • Hearing loss    • Hyperlipidemia    • Hypertension    • Multiple actinic keratoses    • Rosacea    • Squamous cell cancer of scalp and skin of neck    • Squamous cell carcinoma     metastatic, of left posterior cervical lymph node   • Squamous cell carcinoma     of scalp and lower left extremity   • Stroke (CMS/HCC)    • Wears glasses        Past Surgical History:   Procedure Laterality Date   • CERVICAL LYMPH NODE BIOPSY/EXCISION Left     excision of left posterior cervical lymph node   • COLONOSCOPY  08/04/2014    2 polyps not retrieved, internal hemorrhoids   • KNEE ARTHROSCOPY Bilateral    • MOHS SURGERY Right 09/2016    Right Neck and Right Leg   • MOHS  SURGERY Right 2016    Ear   • NECK SURGERY     • ORIF ANKLE FRACTURE Left    • OTHER SURGICAL HISTORY  2013    electrodesiccation and curettage of scalp   • OTHER SURGICAL HISTORY  2011    electrodesiccation and curettage of left lower extremity   • PAROTIDECTOMY Left     left superficial parotidectomy/left posterolateral neck dissection/resection of prior scar/ligation of left chle leak   • SKIN GRAFT      [facility] ear        Outpatient Medications Marked as Taking for the 8/12/19 encounter (Office Visit) with Elyse Hawkins APRN   Medication Sig Dispense Refill   • atorvastatin (LIPITOR) 10 MG tablet Take 1 tablet by mouth Every Night. 30 tablet 5   • brimonidine (ALPHAGAN) 0.2 % ophthalmic solution INSTILL ONE DROP INTO EACH EYE BID  0   • Dabigatran Etexilate Mesylate (PRADAXA PO) Take 150 mg by mouth 2 (Two) Times a Day.     • doxycycline (VIBRAMYCIN) 100 MG capsule Take 100 mg by mouth 2 (Two) Times a Day.     • latanoprost (XALATAN) 0.005 % ophthalmic solution INSTILL ONE DROP INTO EACH EYE QHS  0   • lisinopril-hydrochlorothiazide (PRINZIDE,ZESTORETIC) 20-12.5 MG per tablet Take 1 tablet by mouth Daily.     • Multiple Vitamins-Minerals (MULTIVITAMIN ADULT PO) Take  by mouth.         No Known Allergies    Social History     Socioeconomic History   • Marital status:      Spouse name: Not on file   • Number of children: 1   • Years of education: Not on file   • Highest education level: Not on file   Tobacco Use   • Smoking status: Never Smoker   • Smokeless tobacco: Never Used   • Tobacco comment: nonuser   Substance and Sexual Activity   • Alcohol use: Yes     Comment: few times a week   • Drug use: No   • Sexual activity: Defer       Family History   Problem Relation Age of Onset   • Cancer Mother         breast and metastatic, cause of death   • Stroke Father         cause of death   • Cancer Sister         skin   • No Known Problems Son    • Colon cancer Neg Hx    • Colon polyps Neg Hx        Review of  Systems   Constitutional: Negative for appetite change, chills, fatigue, fever and unexpected weight change.   HENT: Negative for sore throat and trouble swallowing.    Eyes: Negative for visual disturbance.   Respiratory: Negative for cough, shortness of breath and wheezing.    Cardiovascular: Negative for chest pain and palpitations.   Gastrointestinal: Positive for blood in stool. Negative for abdominal distention, abdominal pain, constipation, diarrhea, nausea and vomiting.        As mentioned in hpi   Genitourinary: Negative for difficulty urinating and hematuria.   Musculoskeletal: Negative for arthralgias and back pain.   Skin: Negative for color change and rash.   Neurological: Negative for dizziness, seizures, syncope, light-headedness and headaches.   Hematological: Negative for adenopathy.   Psychiatric/Behavioral: Negative for confusion. The patient is not nervous/anxious.        Objective     Vitals:    08/12/19 0958   BP: 140/82   Pulse: 85   Temp: 97.8 °F (36.6 °C)   SpO2: 95%         08/12/19  0958   Weight: 68.5 kg (151 lb)     Body mass index is 20.48 kg/m².    Physical Exam   Constitutional: He appears well-developed and well-nourished. No distress.   HENT:   Head: Normocephalic and atraumatic.   Eyes: EOM are normal. No scleral icterus.   Neck: Neck supple. No JVD present.   Cardiovascular: Normal rate, regular rhythm and normal heart sounds.   Pulmonary/Chest: Effort normal and breath sounds normal.   Abdominal: Soft. Bowel sounds are normal. He exhibits no distension. There is no tenderness.   Musculoskeletal: Normal range of motion. He exhibits no deformity.   Neurological: He is alert.   Skin: Skin is warm and dry. No rash noted.   Psychiatric: He has a normal mood and affect. His behavior is normal.   Vitals reviewed.      Imaging Results (most recent)     None          Assessment/Plan     Don was seen today for colonoscopy.    Diagnoses and all orders for this visit:    BRBPR (bright red  blood per rectum)  -     Case Request; Standing  -     Case Request    Hx of colonic polyp  -     Case Request; Standing  -     Case Request    Coagulopathy (CMS/HCC)  Comments:  pradaxa , afib, Dr. Ingram.     Essential hypertension    Other orders  -     Follow Anesthesia Guidelines / Standing Orders; Future  -     Implement Anesthesia Orders Day of Procedure; Standing  -     Obtain Informed Consent; Standing  -     Obtain Informed Consent; Future  -     polyethylene glycol (GOLYTELY) 236 g solution; Take 4,000 mL by mouth 1 (One) Time for 1 dose. Take as directed per instruction sheet.         In regards to bright red blood per rectum, differential diagnoses discussed.   Recommended Emergency Room if worsening symptoms.  He is due for colonoscopy and is willing to proceed.       Plan for colonoscopy. The patient was advised to take any blood pressure or heart  medications the morning of  procedure if that is when he/she normally takes.             Body mass index is 20.48 kg/m².    Patient's Body mass index is 20.48 kg/m². BMI is within normal parameters. No follow-up required..    Colonoscopy   All risks, benefits, alternatives, and indications of colonoscopy procedure have been discussed with the patient. Risks to include perforation of the colon requiring possible surgery or colostomy, risk of bleeding from biopsies or removal of colon tissue, possibility of missing a colon polyp or cancer, or adverse drug reaction.  Benefits to include the diagnosis and management of disease of the colon and rectum. Alternatives to include barium enema, radiographic evaluation, lab testing or no intervention. Pt verbalizes understanding and agrees.       The patient was advised on the risks of stopping blood thinners for a procedure.  The risks discussed included the risk of developing myocardial infarction, CVA, embolus, clogging of the arteries or stents, etc.  We discussed the potential consequences of the risks discussed.   The benefits of stopping as well as the alternatives were discussed as well.   Patient is to hold their anticoagulation medication per the direction of their prescribing physician, Dr. Ingram.     A letter will be sent to prescribing This is to prevent any risk or complication from bleeding intra and post procedure. If they develop bleeding post procedure they are to go the emergency department for further evaluation and treatment immediately.         PRESTON Monson      EMR Dragon/transcription disclaimer:  Much of this encounter note is electronic transcription/translation of spoken language to printed text.  The electronic translation of spoken language may be erroneous, or at times, nonsensical words or phrases may be inadvertently transcribed.  Although I have reviewed the note for such errors, some may still exist.

## 2019-09-16 ENCOUNTER — ANESTHESIA EVENT (OUTPATIENT)
Dept: GASTROENTEROLOGY | Facility: HOSPITAL | Age: 79
End: 2019-09-16

## 2019-09-16 ENCOUNTER — HOSPITAL ENCOUNTER (OUTPATIENT)
Facility: HOSPITAL | Age: 79
Setting detail: HOSPITAL OUTPATIENT SURGERY
Discharge: HOME | End: 2019-09-16
Attending: INTERNAL MEDICINE | Admitting: INTERNAL MEDICINE

## 2019-09-16 ENCOUNTER — ANESTHESIA (OUTPATIENT)
Dept: GASTROENTEROLOGY | Facility: HOSPITAL | Age: 79
End: 2019-09-16

## 2019-09-16 VITALS
BODY MASS INDEX: 19.64 KG/M2 | DIASTOLIC BLOOD PRESSURE: 77 MMHG | RESPIRATION RATE: 18 BRPM | OXYGEN SATURATION: 96 % | HEART RATE: 89 BPM | HEIGHT: 72 IN | WEIGHT: 145 LBS | SYSTOLIC BLOOD PRESSURE: 113 MMHG | TEMPERATURE: 98 F

## 2019-09-16 PROCEDURE — G0105 COLORECTAL SCRN; HI RISK IND: HCPCS | Performed by: INTERNAL MEDICINE

## 2019-09-16 PROCEDURE — 25010000002 PROPOFOL 10 MG/ML EMULSION: Performed by: NURSE ANESTHETIST, CERTIFIED REGISTERED

## 2019-09-16 RX ORDER — SODIUM CHLORIDE 0.9 % (FLUSH) 0.9 %
3 SYRINGE (ML) INJECTION EVERY 12 HOURS SCHEDULED
Status: CANCELLED | OUTPATIENT
Start: 2019-09-16

## 2019-09-16 RX ORDER — PROPOFOL 10 MG/ML
VIAL (ML) INTRAVENOUS AS NEEDED
Status: DISCONTINUED | OUTPATIENT
Start: 2019-09-16 | End: 2019-09-16 | Stop reason: SURG

## 2019-09-16 RX ORDER — ONDANSETRON 2 MG/ML
4 INJECTION INTRAMUSCULAR; INTRAVENOUS ONCE AS NEEDED
Status: DISCONTINUED | OUTPATIENT
Start: 2019-09-16 | End: 2019-09-16 | Stop reason: HOSPADM

## 2019-09-16 RX ORDER — SODIUM CHLORIDE 0.9 % (FLUSH) 0.9 %
10 SYRINGE (ML) INJECTION AS NEEDED
Status: DISCONTINUED | OUTPATIENT
Start: 2019-09-16 | End: 2019-09-16 | Stop reason: HOSPADM

## 2019-09-16 RX ORDER — SODIUM CHLORIDE 9 MG/ML
100 INJECTION, SOLUTION INTRAVENOUS CONTINUOUS
Status: CANCELLED | OUTPATIENT
Start: 2019-09-16

## 2019-09-16 RX ORDER — SODIUM CHLORIDE 0.9 % (FLUSH) 0.9 %
3-10 SYRINGE (ML) INJECTION AS NEEDED
Status: CANCELLED | OUTPATIENT
Start: 2019-09-16

## 2019-09-16 RX ORDER — SODIUM CHLORIDE 9 MG/ML
500 INJECTION, SOLUTION INTRAVENOUS CONTINUOUS PRN
Status: DISCONTINUED | OUTPATIENT
Start: 2019-09-16 | End: 2019-09-16 | Stop reason: HOSPADM

## 2019-09-16 RX ADMIN — PROPOFOL 200 MG: 10 INJECTION, EMULSION INTRAVENOUS at 08:46

## 2019-09-16 RX ADMIN — LIDOCAINE HYDROCHLORIDE 50 MG: 20 INJECTION, SOLUTION INTRAVENOUS at 08:46

## 2019-09-16 RX ADMIN — SODIUM CHLORIDE 500 ML: 9 INJECTION, SOLUTION INTRAVENOUS at 07:45

## 2019-09-16 NOTE — ANESTHESIA POSTPROCEDURE EVALUATION
"Patient: Elliot Palma    Procedure Summary     Date:  09/16/19 Room / Location:  [facility] ENDOSCOPY 2 /  [facility]    Anesthesia Start:  0843 Anesthesia Stop:  0904    Procedure:  COLONOSCOPY WITH ANESTHESIA (N/A ) Diagnosis:       BRBPR (bright red blood per rectum)      Hx of colonic polyp      (BRBPR (bright red blood per rectum) [K62.5])      (Hx of colonic polyp [Z86.010])    Surgeon:  Natanael Miller MD Provider:  Xavier Hardy CRNA    Anesthesia Type:  MAC ASA Status:  3          Anesthesia Type: MAC  Last vitals  BP   (!) 186/89 (09/16/19 0726)   Temp   98 °F (36.7 °C) (09/16/19 0726)   Pulse   89 (09/16/19 0726)   Resp   20 (09/16/19 0726)     SpO2   100 % (09/16/19 0726)     Post Anesthesia Care and Evaluation    Patient location during evaluation: PHASE II  Patient participation: complete - patient participated  Level of consciousness: awake and alert  Pain score: 0  Pain management: adequate  Airway patency: patent  Anesthetic complications: No anesthetic complications    Cardiovascular status: acceptable and stable  Respiratory status: room air  Hydration status: acceptable    Comments: Blood pressure (!) 186/89, pulse 89, temperature 98 °F (36.7 °C), temperature source Temporal, resp. rate 20, height 182.9 cm (72\"), weight 65.8 kg (145 lb), SpO2 100 %.    Pt discharged from PACU based on brady score >8      "

## 2019-09-16 NOTE — ANESTHESIA PREPROCEDURE EVALUATION
Anesthesia Evaluation     no history of anesthetic complications:  NPO Solid Status: > 8 hours  NPO Liquid Status: > 2 hours           Airway   Mallampati: I  TM distance: >3 FB  Neck ROM: full  Dental          Pulmonary - normal exam    breath sounds clear to auscultation  (-) asthma, recent URI, sleep apnea, not a smoker  Cardiovascular   Exercise tolerance: good (4-7 METS)    Rhythm: irregular  Rate: normal    (+) hypertension, dysrhythmias Atrial Fib, hyperlipidemia,   (-) pacemaker, past MI, angina, cardiac stents      Neuro/Psych  (+) CVA (2016, no residual symptoms),     (-) seizures, TIA  GI/Hepatic/Renal/Endo    (-) GERD, liver disease, no renal disease, diabetes, hypothyroidism, hyperthyroidism    Musculoskeletal     Abdominal    Substance History      OB/GYN          Other                      Anesthesia Plan    ASA 3     MAC     intravenous induction   Anesthetic plan, all risks, benefits, and alternatives have been provided, discussed and informed consent has been obtained with: patient.

## 2019-09-16 NOTE — H&P
[facility]  Pre Procedure History & Physical    Chief Complaint:   Colon polyps    Subjective     HPI:   The patient has a history of colon polyps who presents for exam.    Past Medical History:   Past Medical History:   Diagnosis Date   • Arrhythmia    • Atrial fibrillation (CMS/HCC)    • Body mass index (BMI) of 23.0-23.9 in adult    • Cancer (CMS/HCC)     basil cell carcinoma    • Glaucoma    • Hearing loss    • Hyperlipidemia    • Hypertension    • Multiple actinic keratoses    • Rosacea    • Squamous cell cancer of scalp and skin of neck    • Squamous cell carcinoma     metastatic, of left posterior cervical lymph node   • Squamous cell carcinoma     of scalp and lower left extremity   • Stroke (CMS/HCC)    • Wears glasses        Past Surgical History:  Past Surgical History:   Procedure Laterality Date   • CERVICAL LYMPH NODE BIOPSY/EXCISION Left     excision of left posterior cervical lymph node   • COLONOSCOPY  08/04/2014    2 polyps not retrieved, internal hemorrhoids   • KNEE ARTHROSCOPY Bilateral    • MOHS SURGERY Right 09/2016    Right Neck and Right Leg   • MOHS SURGERY Right 2016    Ear   • NECK SURGERY     • ORIF ANKLE FRACTURE Left    • OTHER SURGICAL HISTORY  2013    electrodesiccation and curettage of scalp   • OTHER SURGICAL HISTORY  2011    electrodesiccation and curettage of left lower extremity   • PAROTIDECTOMY Left     left superficial parotidectomy/left posterolateral neck dissection/resection of prior scar/ligation of left chle leak   • SKIN GRAFT      [facility] ear        Family History:  Family History   Problem Relation Age of Onset   • Cancer Mother         breast and metastatic, cause of death   • Stroke Father         cause of death   • Cancer Sister         skin   • No Known Problems Son    • Colon cancer Neg Hx    • Colon polyps Neg Hx        Social History:   reports that he has never smoked. He has never used smokeless tobacco. He reports that he drinks alcohol. He  "reports that he does not use drugs.    Medications:   Prior to Admission medications    Medication Sig Start Date End Date Taking? Authorizing Provider   atorvastatin (LIPITOR) 10 MG tablet Take 1 tablet by mouth Every Night. 11/10/16  Yes Michi Bradley MD   doxycycline (VIBRAMYCIN) 100 MG capsule Take 100 mg by mouth 2 (Two) Times a Day.   Yes Shadi Helms MD   latanoprost (XALATAN) 0.005 % ophthalmic solution INSTILL ONE DROP INTO EACH EYE QHS 2/6/17  Yes Shadi Helms MD   lisinopril-hydrochlorothiazide (PRINZIDE,ZESTORETIC) 20-12.5 MG per tablet Take 1 tablet by mouth Daily. 2/22/17  Yes Shaid Helms MD   brimonidine (ALPHAGAN) 0.2 % ophthalmic solution INSTILL ONE DROP INTO EACH EYE BID 2/6/17   Shadi Helms MD   Dabigatran Etexilate Mesylate (PRADAXA PO) Take 150 mg by mouth 2 (Two) Times a Day.    Shadi Helms MD   Multiple Vitamins-Minerals (MULTIVITAMIN ADULT PO) Take  by mouth.    Shadi Helms MD       Allergies:  Patient has no known allergies.    ROS:    General: Weight stable  Resp: No SOA  Cardiovascular: No CP    Objective     Blood pressure (!) 186/89, pulse 89, temperature 98 °F (36.7 °C), temperature source Temporal, resp. rate 20, height 182.9 cm (72\"), weight 65.8 kg (145 lb), SpO2 100 %.    Physical Exam   Constitutional: Pt is oriented to person, place, and in no distress.   HENT: Mouth/Throat: Oropharynx is clear.   Cardiovascular: Normal rate, regular rhythm.    Pulmonary/Chest: Effort normal. No respiratory distress. No  wheezes.   Abdominal: Soft. Non-distended.  Skin: Skin is warm and dry.   Psychiatric: Mood, memory, affect and judgment appear normal.     Assessment/Plan     Diagnosis:  Colon polyps    Anticipated Surgical Procedure:  Colonoscopy    The risks, benefits, and alternatives of this procedure have been discussed with the patient or the responsible party- the patient understands and agrees to proceed.      EMR " Dragon/transcription disclaimer:  Much of this encounter note is electronic transcription/translation of spoken language to printed text.  The electronic translation of spoken language may be erroneous, or at times, nonsensical words or phrases may be inadvertently transcribed.  Although I have reviewed the note for such errors, some may still exist.

## 2020-02-17 ENCOUNTER — OFFICE VISIT (OUTPATIENT)
Dept: CARDIOLOGY | Facility: CLINIC | Age: 80
End: 2020-02-17

## 2020-02-17 VITALS
BODY MASS INDEX: 21.81 KG/M2 | HEART RATE: 65 BPM | DIASTOLIC BLOOD PRESSURE: 82 MMHG | HEIGHT: 72 IN | SYSTOLIC BLOOD PRESSURE: 141 MMHG | WEIGHT: 161 LBS

## 2020-02-17 DIAGNOSIS — I48.21 PERMANENT ATRIAL FIBRILLATION (HCC): Primary | ICD-10-CM

## 2020-02-17 DIAGNOSIS — Z79.01 CHRONIC ANTICOAGULATION: ICD-10-CM

## 2020-02-17 DIAGNOSIS — E78.5 DYSLIPIDEMIA: ICD-10-CM

## 2020-02-17 DIAGNOSIS — I10 ESSENTIAL HYPERTENSION: ICD-10-CM

## 2020-02-17 PROCEDURE — 99214 OFFICE O/P EST MOD 30 MIN: CPT | Performed by: INTERNAL MEDICINE

## 2020-02-17 PROCEDURE — 93000 ELECTROCARDIOGRAM COMPLETE: CPT | Performed by: INTERNAL MEDICINE

## 2020-02-17 NOTE — PROGRESS NOTES
Reason for Visit: cardiovascular follow up.    HPI:  Elliot Palma is a 79 y.o. male is here today for 1 year follow-up.  He has been doing well and not having any issues or complaints.  He denies any chest pain, palpitations, dizziness, syncope, PND, or orthopnea.  He continues to run on a regular.  His blood pressure is always well controlled at home.    Previous Cardiac Testing and Procedures:  - Echo (11/09/2016) EF 55-60%, left atrium mildly dilated, normal RV size and function, mild MR and TR  - Carotid ultrasound (03/05/2018) less than 50% bilaterally  - Lipid panel 11/14/2018) 142/68/63/57    Patient Active Problem List   Diagnosis   • Metastatic squamous cell carcinoma (CMS/HCC)   • Cerebrovascular accident (CVA) due to thrombosis of left middle cerebral artery (CMS/HCC)   • Dyslipidemia   • Permanent atrial fibrillation   • Dysphagia   • Essential hypertension   • Cavus deformity of foot   • Hx of colonic polyp   • BRBPR (bright red blood per rectum)   • Chronic anticoagulation       Social History     Tobacco Use   • Smoking status: Never Smoker   • Smokeless tobacco: Never Used   • Tobacco comment: nonuser   Substance Use Topics   • Alcohol use: Yes     Comment: few times a week   • Drug use: No       Family History   Problem Relation Age of Onset   • Cancer Mother         breast and metastatic, cause of death   • Stroke Father         cause of death   • Cancer Sister         skin   • No Known Problems Son    • Colon cancer Neg Hx    • Colon polyps Neg Hx        The following portions of the patient's history were reviewed and updated as appropriate: allergies, current medications, past family history, past medical history, past social history, past surgical history and problem list.      Current Outpatient Medications:   •  atorvastatin (LIPITOR) 10 MG tablet, Take 1 tablet by mouth Every Night., Disp: 30 tablet, Rfl: 5  •  brimonidine (ALPHAGAN) 0.2 % ophthalmic solution, INSTILL ONE DROP INTO EACH  "EYE BID, Disp: , Rfl: 0  •  Dabigatran Etexilate Mesylate (PRADAXA PO), Take 150 mg by mouth 2 (Two) Times a Day., Disp: , Rfl:   •  doxycycline (VIBRAMYCIN) 100 MG capsule, Take 100 mg by mouth 2 (Two) Times a Day., Disp: , Rfl:   •  latanoprost (XALATAN) 0.005 % ophthalmic solution, INSTILL ONE DROP INTO EACH EYE QHS, Disp: , Rfl: 0  •  lisinopril-hydrochlorothiazide (PRINZIDE,ZESTORETIC) 20-12.5 MG per tablet, Take 1 tablet by mouth Daily., Disp: , Rfl:   •  Multiple Vitamins-Minerals (MULTIVITAMIN ADULT PO), Take  by mouth., Disp: , Rfl:     Review of Systems   Constitution: Negative for chills and fever.   Cardiovascular: Negative for chest pain and paroxysmal nocturnal dyspnea.   Respiratory: Negative for cough and shortness of breath.    Skin: Negative for rash.   Gastrointestinal: Negative for abdominal pain and heartburn.   Neurological: Negative for dizziness and numbness.       Objective   /82   Pulse 65   Ht 182.9 cm (72\")   Wt 73 kg (161 lb)   BMI 21.84 kg/m²   Physical Exam   Constitutional: He is oriented to person, place, and time. He appears well-developed and well-nourished.   HENT:   Head: Normocephalic and atraumatic.   Cardiovascular: Normal rate and normal heart sounds. An irregularly irregular rhythm present.   No murmur heard.  Pulmonary/Chest: Effort normal and breath sounds normal.   Musculoskeletal: He exhibits no edema.   Neurological: He is alert and oriented to person, place, and time.   Skin: Skin is warm and dry.   Psychiatric: He has a normal mood and affect.       ECG 12 Lead  Date/Time: 2/17/2020 9:32 AM  Performed by: Jomar Ingram MD  Authorized by: Jomar Ingram MD   Comparison: compared with previous ECG from 2/11/2019  Similar to previous ECG  Rhythm: atrial fibrillation  Rate: normal  Other findings: poor R wave progression              ICD-10-CM ICD-9-CM   1. Permanent atrial fibrillation I48.21 427.31   2. Essential hypertension I10 401.9   3. Dyslipidemia " E78.5 272.4   4. Chronic anticoagulation Z79.01 V58.61         Assessment/Plan:  1. Persistent atrial fibrillation/flutter: Rate controlled without any AV krystina blocking drugs.  No symptoms consistent with atrial fibrillation.  Continue anticoagulation with Pradaxa.      2.  Essential hypertension: Blood pressure is borderline today but well controlled at home.  Continue current medications.        3. Dyslipidemia: Continue atorvastatin.  Well controlled on last lipid panel.  Followed by Dr Bradley.      4.  Chronic anticoagulation: With Pradaxa.

## 2021-02-03 ENCOUNTER — IMMUNIZATION (OUTPATIENT)
Age: 81
End: 2021-02-03
Payer: MEDICARE

## 2021-02-03 PROCEDURE — 91300 COVID-19, PFIZER VACCINE 30MCG/0.3ML DOSE: CPT | Performed by: FAMILY MEDICINE

## 2021-02-03 PROCEDURE — 0001A PR IMM ADMN SARSCOV2 30MCG/0.3ML DIL RECON 1ST DOSE: CPT | Performed by: FAMILY MEDICINE

## 2021-02-22 ENCOUNTER — OFFICE VISIT (OUTPATIENT)
Dept: CARDIOLOGY | Facility: CLINIC | Age: 81
End: 2021-02-22

## 2021-02-22 VITALS
DIASTOLIC BLOOD PRESSURE: 70 MMHG | WEIGHT: 163 LBS | OXYGEN SATURATION: 98 % | HEART RATE: 60 BPM | BODY MASS INDEX: 22.08 KG/M2 | SYSTOLIC BLOOD PRESSURE: 130 MMHG | HEIGHT: 72 IN

## 2021-02-22 DIAGNOSIS — I48.21 PERMANENT ATRIAL FIBRILLATION (HCC): Primary | Chronic | ICD-10-CM

## 2021-02-22 DIAGNOSIS — I10 ESSENTIAL HYPERTENSION: Chronic | ICD-10-CM

## 2021-02-22 DIAGNOSIS — E78.5 DYSLIPIDEMIA: ICD-10-CM

## 2021-02-22 DIAGNOSIS — Z79.01 CHRONIC ANTICOAGULATION: Chronic | ICD-10-CM

## 2021-02-22 PROCEDURE — 99214 OFFICE O/P EST MOD 30 MIN: CPT | Performed by: NURSE PRACTITIONER

## 2021-02-22 PROCEDURE — 93000 ELECTROCARDIOGRAM COMPLETE: CPT | Performed by: NURSE PRACTITIONER

## 2021-02-22 NOTE — PROGRESS NOTES
Subjective:     Encounter Date: 02/22/2021      Patient ID: Elliot Palma is a 80 y.o. male with a history of permanent atrial fibrillation, anticoagulated with Pradaxa, hypertension and dyslipidemia.    Chief Complaint: routine yearly follow up  Atrial Fibrillation  Presents for follow-up visit. Symptoms are negative for bradycardia, chest pain, dizziness, hemodynamic instability, hypertension, hypotension, pacemaker problem, palpitations, shortness of breath, syncope, tachycardia and weakness. The symptoms have been stable. Past medical history includes atrial fibrillation. There are no medication compliance problems.     Patient presents today for routine yearly follow up. Patient reports that he has been doing well. He states that he checks his blood pressure at home daily and it has been running 110-130/60-70. Patient reports that his heart rate is well controlled 60-70's. He states that he still runs daily. Patient is on Pradaxa and denies any bleeding issues. Patient denies any chest pain, heart racing, palpitations, shortness of breath, dyspnea on exertion, leg swelling, orthopnea or PND. Patient follows with Dr Bradley as PCP.     Previous Cardiac Testing and Procedures:  - Echo (11/09/2016) EF 55-60%, left atrium mildly dilated, normal RV size and function, mild MR and TR  - Carotid ultrasound (03/05/2018) less than 50% bilaterally  - Lipid panel 11/14/2018) 142/68/63/57     The following portions of the patient's history were reviewed and updated as appropriate: allergies, current medications, past family history, past medical history, past social history, past surgical history and problem list.    No Known Allergies    Current Outpatient Medications:   •  atorvastatin (LIPITOR) 10 MG tablet, Take 1 tablet by mouth Every Night., Disp: 30 tablet, Rfl: 5  •  brimonidine (ALPHAGAN) 0.2 % ophthalmic solution, INSTILL ONE DROP INTO EACH EYE BID, Disp: , Rfl: 0  •  Dabigatran Etexilate Mesylate (PRADAXA PO),  Take 150 mg by mouth 2 (Two) Times a Day., Disp: , Rfl:   •  doxycycline (VIBRAMYCIN) 100 MG capsule, Take 50 mg by mouth 2 (Two) Times a Day., Disp: , Rfl:   •  latanoprost (XALATAN) 0.005 % ophthalmic solution, INSTILL ONE DROP INTO EACH EYE QHS, Disp: , Rfl: 0  •  lisinopril-hydrochlorothiazide (PRINZIDE,ZESTORETIC) 20-12.5 MG per tablet, Take 1 tablet by mouth Daily., Disp: , Rfl:   •  Multiple Vitamins-Minerals (MULTIVITAMIN ADULT PO), Take  by mouth., Disp: , Rfl:   Past Medical History:   Diagnosis Date   • Arrhythmia    • Atrial fibrillation (CMS/HCC)    • Body mass index (BMI) of 23.0-23.9 in adult    • Cancer (CMS/HCC)     basil cell carcinoma    • Glaucoma    • Hearing loss    • Hyperlipidemia    • Hypertension    • Multiple actinic keratoses    • Rosacea    • Squamous cell cancer of scalp and skin of neck    • Squamous cell carcinoma     metastatic, of left posterior cervical lymph node   • Squamous cell carcinoma     of scalp and lower left extremity   • Stroke (CMS/HCC)    • Wears glasses      Social History     Socioeconomic History   • Marital status:      Spouse name: Not on file   • Number of children: 1   • Years of education: Not on file   • Highest education level: Not on file   Tobacco Use   • Smoking status: Never Smoker   • Smokeless tobacco: Never Used   • Tobacco comment: nonuser   Substance and Sexual Activity   • Alcohol use: Yes     Comment: few times a week   • Drug use: No   • Sexual activity: Defer       Review of Systems   Constitution: Negative for malaise/fatigue, weight gain and weight loss.   Cardiovascular: Negative for chest pain, dyspnea on exertion, leg swelling, orthopnea, palpitations and syncope.   Respiratory: Negative for shortness of breath.    Hematologic/Lymphatic: Bruises/bleeds easily.   Neurological: Negative for dizziness and weakness.          Objective:     Vitals signs reviewed.   Constitutional:       General: Not in acute distress.     Appearance:  "Normal appearance. Well-developed.   Eyes:      Pupils: Pupils are equal, round, and reactive to light.   HENT:      Head: Normocephalic and atraumatic.      Nose: Nose normal.   Neck:      Musculoskeletal: Normal range of motion and neck supple.      Vascular: No carotid bruit.   Pulmonary:      Effort: Pulmonary effort is normal. No respiratory distress.      Breath sounds: Normal breath sounds. No wheezing. No rales.   Cardiovascular:      Normal rate. Regularly irregular rhythm.      Murmurs: There is no murmur.   Edema:     Peripheral edema absent.   Abdominal:      General: There is no distension.      Palpations: Abdomen is soft.   Musculoskeletal: Normal range of motion.   Skin:     General: Skin is warm.      Findings: No erythema or rash.   Neurological:      Mental Status: Alert and oriented to person, place, and time.   Psychiatric:         Speech: Speech normal.         Behavior: Behavior normal.         Thought Content: Thought content normal.         Judgment: Judgment normal.         /70   Pulse 60   Ht 182.9 cm (72\")   Wt 73.9 kg (163 lb)   SpO2 98%   BMI 22.11 kg/m²       ECG 12 Lead    Date/Time: 2/22/2021 9:10 AM  Performed by: Wilton Ocasio APRN  Authorized by: Wilton Ocasio APRN   Comparison: compared with previous ECG from 2/17/2020  Similar to previous ECG  Rhythm: atrial fibrillation  Rate: normal  BPM: 63              Lab Review:       Lab Results   Component Value Date    CHOL 170 11/08/2016    CHLPL 142 (L) 11/14/2018    TRIG 57 11/14/2018    HDL 68 11/14/2018    LDL 63 11/14/2018       I have personally reviewed past office notes prior to patients visit  Assessment:          Diagnosis Plan   1. Permanent atrial fibrillation (CMS/HCC)     2. Chronic anticoagulation     3. Essential hypertension     4. Dyslipidemia            Plan:       1. Permanent Atrial Fibrillation: EKG today shows Atrial Fibrillation rate of 63. Patient is rate controlled without any AV krystina " blocking medications. Patient is asymptomatic and anticoagulated. Patient is on Pradaxa and denies any bleeding issues.     2. Anticoagulation: Patient is on Pradaxa and denies any bleeding issues.     3. Hypertension: Well controlled. Continue current medications    4. Dyslipidemia: Followed and managed by PCP. Will obtain most recent labs. Continue atorvastatin.     Advance Care Planning   ACP discussion was held with the patient during this visit. Patient does not have an advance directive, information provided.    Patient is to follow up in 1 year or sooner if needed

## 2021-02-22 NOTE — PATIENT INSTRUCTIONS
Advance Care Planning and Advance Directives     You make decisions on a daily basis - decisions about where you want to live, your career, your home, your life. Perhaps one of the most important decisions you face is your choice for future medical care. Take time to talk with your family and your healthcare team and start planning today.  Advance Care Planning is a process that can help you:  · Understand possible future healthcare decisions in light of your own experiences  · Reflect on those decision in light of your goals and values  · Discuss your decisions with those closest to you and the healthcare professionals that care for you  · Make a plan by creating a document that reflects your wishes    Surrogate Decision Maker  In the event of a medical emergency, which has left you unable to communicate or to make your own decisions, you would need someone to make decisions for you.  It is important to discuss your preferences for medical treatment with this person while you are in good health.     Qualities of a surrogate decision maker:  • Willing to take on this role and responsibility  • Knows what you want for future medical care  • Willing to follow your wishes even if they don't agree with them  • Able to make difficult medical decisions under stressful circumstances    Advance Directives  These are legal documents you can create that will guide your healthcare team and decision maker(s) when needed. These documents can be stored in the electronic medical record.    · Living Will - a legal document to guide your care if you have a terminal condition or a serious illness and are unable to communicate. States vary by statute in document names/types, but most forms may include one or more of the following:        -  Directions regarding life-prolonging treatments        -  Directions regarding artificially provided nutrition/hydration        -  Choosing a healthcare decision maker        -  Direction  regarding organ/tissue donation    · Durable Power of  for Healthcare - this document names an -in-fact to make medical decisions for you, but it may also allow this person to make personal and financial decisions for you. Please seek the advice of an  if you need this type of document.    **Advance Directives are not required and no one may discriminate against you if you do not sign one.    Medical Orders  Many states allow specific forms/orders signed by your physician to record your wishes for medical treatment in your current state of health. This form, signed in personal communication with your physician, addresses resuscitation and other medical interventions that you may or may not want.      For more information or to schedule a time with a [facility] Advance Care Planning Facilitator contact: [email or web address] or call [phone] and someone will contact you directly.

## 2021-02-24 ENCOUNTER — IMMUNIZATION (OUTPATIENT)
Age: 81
End: 2021-02-24
Payer: MEDICARE

## 2021-02-24 PROCEDURE — 0002A COVID-19, PFIZER VACCINE 30MCG/0.3ML DOSE: CPT | Performed by: FAMILY MEDICINE

## 2021-02-24 PROCEDURE — 91300 COVID-19, PFIZER VACCINE 30MCG/0.3ML DOSE: CPT | Performed by: FAMILY MEDICINE

## 2022-02-25 ENCOUNTER — OFFICE VISIT (OUTPATIENT)
Dept: CARDIOLOGY | Facility: CLINIC | Age: 82
End: 2022-02-25

## 2022-02-25 VITALS
DIASTOLIC BLOOD PRESSURE: 86 MMHG | WEIGHT: 164 LBS | HEIGHT: 72 IN | BODY MASS INDEX: 22.21 KG/M2 | SYSTOLIC BLOOD PRESSURE: 120 MMHG | HEART RATE: 89 BPM

## 2022-02-25 DIAGNOSIS — E78.5 DYSLIPIDEMIA: ICD-10-CM

## 2022-02-25 DIAGNOSIS — I48.21 PERMANENT ATRIAL FIBRILLATION: Primary | ICD-10-CM

## 2022-02-25 DIAGNOSIS — I10 ESSENTIAL HYPERTENSION: ICD-10-CM

## 2022-02-25 DIAGNOSIS — Z79.01 CHRONIC ANTICOAGULATION: ICD-10-CM

## 2022-02-25 PROCEDURE — 99214 OFFICE O/P EST MOD 30 MIN: CPT | Performed by: INTERNAL MEDICINE

## 2022-02-25 RX ORDER — DORZOLAMIDE HYDROCHLORIDE AND TIMOLOL MALEATE 20; 5 MG/ML; MG/ML
1 SOLUTION/ DROPS OPHTHALMIC 2 TIMES DAILY
COMMUNITY
Start: 2022-02-23

## 2022-02-25 NOTE — PROGRESS NOTES
Reason for Visit: cardiovascular follow up.    HPI:  Elliot Palma is a 81 y.o. male is here today for 1 year follow-up.  He has been doing well and not having any issues or complaints.  He denies any chest pain, palpitations, dizziness, syncope, PND, or orthopnea.  The atrial fibrillation does not seem to bother him.  He runs about a mile every day.   His blood pressure is normal today with some fluctuations at home.      Previous Cardiac Testing and Procedures:  - Echo (11/09/2016) EF 55-60%, left atrium mildly dilated, normal RV size and function, mild MR and TR  - Carotid ultrasound (03/05/2018) less than 50% bilaterally  - Lipid panel 11/14/2018) total cholesterol 142, HDL 68, LDL 63, triglycerides 57  - Lipid panel (9/3/2021) total cholesterol 118, HDL 62, LDL 45, triglycerides 46    Patient Active Problem List   Diagnosis   • Metastatic squamous cell carcinoma (HCC)   • Cerebrovascular accident (CVA) due to thrombosis of left middle cerebral artery (HCC)   • Dyslipidemia   • Permanent atrial fibrillation   • Dysphagia   • Essential hypertension   • Cavus deformity of foot   • Hx of colonic polyp   • BRBPR (bright red blood per rectum)   • Chronic anticoagulation       Social History     Tobacco Use   • Smoking status: Never Smoker   • Smokeless tobacco: Never Used   • Tobacco comment: nonuser   Vaping Use   • Vaping Use: Never used   Substance Use Topics   • Alcohol use: Yes     Comment: few times a week   • Drug use: No       Family History   Problem Relation Age of Onset   • Cancer Mother         breast and metastatic, cause of death   • Stroke Father         cause of death   • Cancer Sister         skin   • No Known Problems Son    • Colon cancer Neg Hx    • Colon polyps Neg Hx        The following portions of the patient's history were reviewed and updated as appropriate: allergies, current medications, past family history, past medical history, past social history, past surgical history and problem  "list.      Current Outpatient Medications:   •  atorvastatin (LIPITOR) 10 MG tablet, Take 1 tablet by mouth Every Night., Disp: 30 tablet, Rfl: 5  •  brimonidine (ALPHAGAN) 0.2 % ophthalmic solution, INSTILL ONE DROP INTO EACH EYE BID, Disp: , Rfl: 0  •  Dabigatran Etexilate Mesylate (PRADAXA PO), Take 150 mg by mouth 2 (Two) Times a Day., Disp: , Rfl:   •  dorzolamide-timolol (COSOPT) 22.3-6.8 MG/ML ophthalmic solution, , Disp: , Rfl:   •  doxycycline (VIBRAMYCIN) 100 MG capsule, Take 50 mg by mouth 2 (Two) Times a Day., Disp: , Rfl:   •  latanoprost (XALATAN) 0.005 % ophthalmic solution, INSTILL ONE DROP INTO EACH EYE QHS, Disp: , Rfl: 0  •  lisinopril-hydrochlorothiazide (PRINZIDE,ZESTORETIC) 20-12.5 MG per tablet, Take 1 tablet by mouth Daily., Disp: , Rfl:   •  Multiple Vitamins-Minerals (MULTIVITAMIN ADULT PO), Take  by mouth., Disp: , Rfl:     Review of Systems   Constitutional: Negative for chills and fever.   Cardiovascular: Negative for chest pain, palpitations, paroxysmal nocturnal dyspnea and syncope.   Respiratory: Negative for cough and shortness of breath.    Skin: Negative for rash.   Gastrointestinal: Negative for abdominal pain and heartburn.   Neurological: Negative for dizziness and numbness.       Objective   /86 (BP Location: Left arm, Patient Position: Sitting, Cuff Size: Adult)   Pulse 89   Ht 182.9 cm (72\")   Wt 74.4 kg (164 lb)   BMI 22.24 kg/m²   Constitutional:       Appearance: Well-developed and normal weight.   HENT:      Head: Normocephalic and atraumatic.   Pulmonary:      Effort: Pulmonary effort is normal.      Breath sounds: Normal breath sounds.   Cardiovascular:      Normal rate. Irregularly irregular rhythm.      Murmurs: There is no murmur.      No gallop. No click.   Skin:     General: Skin is warm and dry.   Neurological:      Mental Status: Alert and oriented to person, place, and time.       Procedures      ICD-10-CM ICD-9-CM   1. Permanent atrial fibrillation  " I48.21 427.31   2. Essential hypertension  I10 401.9   3. Dyslipidemia  E78.5 272.4   4. Chronic anticoagulation  Z79.01 V58.61         Assessment/Plan:  1.  Permanent atrial fibrillation/flutter:  Heart rate remains normal today without any AV krystina blocking drugs.  Continue Pradaxa.     2.  Essential hypertension: Blood pressure is reasonably well controlled today.  Continue lisinopril and HCTZ.      3. Dyslipidemia: Well-controlled based on lipid panel from 9/4/2021.  Continue atorvastatin.     4.  Chronic anticoagulation: Continue Pradaxa.

## 2022-04-26 ENCOUNTER — HOSPITAL ENCOUNTER (OUTPATIENT)
Dept: GENERAL RADIOLOGY | Age: 82
Discharge: HOME | End: 2022-04-26
Payer: MEDICARE

## 2022-04-26 ENCOUNTER — OFFICE VISIT (OUTPATIENT)
Age: 82
End: 2022-04-26
Payer: MEDICARE

## 2022-04-26 VITALS
HEIGHT: 72 IN | RESPIRATION RATE: 20 BRPM | DIASTOLIC BLOOD PRESSURE: 80 MMHG | TEMPERATURE: 98.1 F | SYSTOLIC BLOOD PRESSURE: 139 MMHG | OXYGEN SATURATION: 94 % | BODY MASS INDEX: 22.16 KG/M2 | WEIGHT: 163.6 LBS | HEART RATE: 110 BPM

## 2022-04-26 DIAGNOSIS — M25.532 LEFT WRIST PAIN: ICD-10-CM

## 2022-04-26 DIAGNOSIS — M79.642 LEFT HAND PAIN: ICD-10-CM

## 2022-04-26 DIAGNOSIS — S62.317A CLOSED DISPLACED FRACTURE OF BASE OF FIFTH METACARPAL BONE OF LEFT HAND, INITIAL ENCOUNTER: Primary | ICD-10-CM

## 2022-04-26 PROCEDURE — 4040F PNEUMOC VAC/ADMIN/RCVD: CPT | Performed by: NURSE PRACTITIONER

## 2022-04-26 PROCEDURE — 73130 X-RAY EXAM OF HAND: CPT

## 2022-04-26 PROCEDURE — 73110 X-RAY EXAM OF WRIST: CPT

## 2022-04-26 PROCEDURE — 99204 OFFICE O/P NEW MOD 45 MIN: CPT | Performed by: NURSE PRACTITIONER

## 2022-04-26 PROCEDURE — 1036F TOBACCO NON-USER: CPT | Performed by: NURSE PRACTITIONER

## 2022-04-26 PROCEDURE — G8420 CALC BMI NORM PARAMETERS: HCPCS | Performed by: NURSE PRACTITIONER

## 2022-04-26 PROCEDURE — G8427 DOCREV CUR MEDS BY ELIG CLIN: HCPCS | Performed by: NURSE PRACTITIONER

## 2022-04-26 PROCEDURE — 1123F ACP DISCUSS/DSCN MKR DOCD: CPT | Performed by: NURSE PRACTITIONER

## 2022-04-26 RX ORDER — DOXYCYCLINE HYCLATE 100 MG/1
50 CAPSULE ORAL 2 TIMES DAILY
COMMUNITY

## 2022-04-26 RX ORDER — ATORVASTATIN CALCIUM 10 MG/1
TABLET, FILM COATED ORAL
COMMUNITY
Start: 2022-04-13

## 2022-04-26 RX ORDER — ATENOLOL 100 MG/1
TABLET ORAL
COMMUNITY
Start: 2022-04-25

## 2022-04-26 RX ORDER — LATANOPROST 50 UG/ML
SOLUTION/ DROPS OPHTHALMIC
COMMUNITY

## 2022-04-26 RX ORDER — LATANOPROST 50 UG/ML
SOLUTION/ DROPS OPHTHALMIC
COMMUNITY
Start: 2022-03-30

## 2022-04-26 RX ORDER — LISINOPRIL AND HYDROCHLOROTHIAZIDE 25; 20 MG/1; MG/1
TABLET ORAL
COMMUNITY
Start: 2022-03-16

## 2022-04-26 ASSESSMENT — ENCOUNTER SYMPTOMS
EYE DISCHARGE: 0
SHORTNESS OF BREATH: 0
EYE ITCHING: 0
NAUSEA: 0
CONSTIPATION: 0
WHEEZING: 0
SINUS PRESSURE: 0
COUGH: 0
BLOOD IN STOOL: 0
COLOR CHANGE: 0
RHINORRHEA: 0
DIARRHEA: 0
VOMITING: 0
SORE THROAT: 0
ABDOMINAL PAIN: 0

## 2022-04-26 NOTE — PROGRESS NOTES
[address]  [address] [address] [place] [address]  Dept: [phone]  Dept Fax: [phone]  Loc: [phone]    Jillian Kong is a 79 y.o. male who presents today for his medical conditions/complaints as noted below. Jillian Kong is complaining of Hand Injury (left hand a week ago ), Swelling, Bleeding/Bruising, and Hand Pain        HPI:   Hand Injury   The incident occurred more than 1 week ago. The injury mechanism was a fall. The pain is present in the left hand and left wrist. The pain is moderate. The pain has been fluctuating since the incident. Pertinent negatives include no chest pain, muscle weakness, numbness or tingling. The symptoms are aggravated by movement. He has tried ice for the symptoms. The treatment provided mild relief. Past Medical History:   Diagnosis Date    Hypertension        Past Surgical History:   Procedure Laterality Date    KNEE ARTHROSCOPY      BOTH KNEES    MOHS SURGERY         History reviewed. No pertinent family history. Social History     Tobacco Use    Smoking status: Never Smoker    Smokeless tobacco: Never Used   Substance Use Topics    Alcohol use: Yes        Current Outpatient Medications   Medication Sig Dispense Refill    lisinopril-hydroCHLOROthiazide (PRINZIDE;ZESTORETIC) 20-25 MG per tablet       PRADAXA 150 MG capsule       atorvastatin (LIPITOR) 10 MG tablet TAKE 1 TABLET BY MOUTH DAILY      MULTIPLE VITAMINS-MINERALS ER PO Take by mouth      doxycycline hyclate (VIBRAMYCIN) 100 MG capsule Take 50 mg by mouth 2 times daily      latanoprost (XALATAN) 0.005 % ophthalmic solution INSTILL 1 DROP IN BOTH EYES EVERY EVENING      latanoprost (XALATAN) 0.005 % ophthalmic solution        No current facility-administered medications for this visit.        No Known Allergies    Health Maintenance   Topic Date Due    Depression Screen  Never done    DTaP/Tdap/Td vaccine (1 - Tdap) Never done    Shingles Vaccine (1 of 2) Never done    Pneumococcal 65+ years Vaccine (1 - PCV) Never done    Lipids  11/14/2019    Potassium  11/14/2019    Creatinine  11/14/2019    Annual Wellness Visit (AWV)  Never done    COVID-19 Vaccine (3 - Booster for [place] series) 07/24/2021    Flu vaccine (Season Ended) 09/01/2022    Hepatitis A vaccine  Aged Out    Hepatitis B vaccine  Aged Out    Hib vaccine  Aged Out    Meningococcal (ACWY) vaccine  Aged Out       Subjective:   Review of Systems   Constitutional: Negative for activity change, appetite change, chills, fatigue and fever. HENT: Negative for congestion, ear pain, rhinorrhea, sinus pressure and sore throat. Eyes: Negative for discharge and itching. Respiratory: Negative for cough, shortness of breath and wheezing. Cardiovascular: Negative for chest pain. Gastrointestinal: Negative for abdominal pain, blood in stool, constipation, diarrhea, nausea and vomiting. Musculoskeletal: Positive for arthralgias (left hand pain). Negative for myalgias. Skin: Negative for color change and rash. Neurological: Negative for dizziness, tingling, numbness and headaches. All other systems reviewed and are negative. Objective    Physical Exam  Vitals and nursing note reviewed. Constitutional:       General: He is not in acute distress. Appearance: Normal appearance. HENT:      Head: Normocephalic and atraumatic. Right Ear: Tympanic membrane and ear canal normal.      Left Ear: Tympanic membrane and ear canal normal.      Mouth/Throat:      Mouth: Mucous membranes are moist.      Pharynx: No posterior oropharyngeal erythema. Eyes:      Extraocular Movements: Extraocular movements intact. Pupils: Pupils are equal, round, and reactive to light. Cardiovascular:      Rate and Rhythm: Normal rate and regular rhythm. Pulses: Normal pulses. Heart sounds: Normal heart sounds. No murmur heard.       Pulmonary: Effort: Pulmonary effort is normal. No respiratory distress. Breath sounds: Normal breath sounds. No wheezing. Musculoskeletal:      Right wrist: No swelling. Normal range of motion. Normal pulse. Left wrist: Swelling present. Normal range of motion. Normal pulse. Hands:    Skin:     General: Skin is warm. Capillary Refill: Capillary refill takes less than 2 seconds. Coloration: Skin is not pale. Findings: No rash. Neurological:      General: No focal deficit present. Mental Status: He is alert and oriented to person, place, and time. Psychiatric:         Attention and Perception: Attention normal.         Mood and Affect: Mood normal.         Behavior: Behavior normal. Behavior is cooperative. Thought Content: Thought content normal.         /80   Pulse 110   Temp 98.1 °F (36.7 °C)   Resp 20   Ht 6' (1.829 m)   Wt 163 lb 9.6 oz (74.2 kg)   SpO2 94%   BMI 22.19 kg/m²     Assessment         Diagnosis Orders   1. Left hand pain  XR HAND LEFT (MIN 3 VIEWS)   2. Left wrist pain  XR WRIST LEFT (MIN 3 VIEWS)       Plan   -Xray today; will call with results  -Use ice as needed - use only 15 minutes at a time  -Encouraged adequate rest  -Recommended compression/bracing  -Recommended elevation of the area  -Patient may use ibuprofen or tylenol for pain unless contraindicated  -The patient is to follow up with PCP or return to clinic if symptoms worsen/fail to improve.       Orders Placed This Encounter   Procedures    XR HAND LEFT (MIN 3 VIEWS)     Standing Status:   Future     Standing Expiration Date:   4/26/2023     Order Specific Question:   Reason for exam:     Answer:   After a fall 1 week ago    XR WRIST LEFT (MIN 3 VIEWS)     Standing Status:   Future     Standing Expiration Date:   4/26/2023     Order Specific Question:   Reason for exam:     Answer:   After a fall 1 week ago     Narrative   EXAMINATION: XR HAND LEFT (MIN 3 VIEWS) 4/26/2022 12:08 PM HISTORY: The patient fell one week ago and has persistent pain. Report: 3 views of the left hand were obtained. COMPARISON: There are no correlative imaging studies for comparison. There is an acute mildly displaced extra-articular obliquely oriented   fracture through the shaft of the fifth metacarpal with overlying soft   tissue swelling. The joint spaces are preserved. The other osseous   structures appear unremarkable.       Impression   Acute closed mildly displaced extra-articular obliquely   oriented fracture through the shaft of the fifth metacarpal with   overlying soft tissues swelling. Signed by Dr Duane Engel. [place]     Referred to [facility] walk in clinic today due to mildly displaced fracture. Return if symptoms worsen or fail to improve. Discussed use, benefits, and side effects of any prescribed medications. All patient questions were answered. Patient voiced understanding of care plan. Patient was given educational materials - see patient instructions below. Patient Instructions       Patient Education        Hand Pain: Care Instructions  Your Care Instructions     Common causes of hand pain are overuse and injuries, such as might happen during sports or home repair projects. Everyday wear and tear, especially asyou get older, also can cause hand pain. Most minor hand injuries will heal on their own, and home treatment is usually all you need to do. If you have sudden and severe pain, you may need tests andtreatment. Follow-up care is a key part of your treatment and safety. Be sure to make and go to all appointments, and call your doctor if you are having problems. It's also a good idea to know your test results and keep alist of the medicines you take. How can you care for yourself at home?  Take pain medicines exactly as directed. ? If the doctor gave you a prescription medicine for pain, take it as prescribed.   ? If you are not taking a prescription pain medicine, ask your doctor if you can take an over-the-counter medicine.  Rest and protect your hand. Take a break from any activity that may cause pain.  Put ice or a cold pack on your hand for 10 to 20 minutes at a time. Put a thin cloth between the ice and your skin.  Prop up the sore hand on a pillow when you ice it or anytime you sit or lie down during the next 3 days. Try to keep it above the level of your heart. This will help reduce swelling.  If your doctor recommends a sling, splint, or elastic bandage to support your hand, wear it as directed. When should you call for help? Call 911 anytime you think you may need emergency care. For example, call if:     Your hand turns cool or pale or changes color. Call your doctor now or seek immediate medical care if:     You cannot move your hand.      Your hand pops, moves out of its normal position, and then returns to its normal position.      You have signs of infection, such as:  ? Increased pain, swelling, warmth, or redness. ? Red streaks leading from the sore area. ? Pus draining from a place on your hand. ? A fever.      Your hand feels numb or tingly. Watch closely for changes in your health, and be sure to contact your doctor if:     Your hand feels unstable when you try to use it.      You do not get better as expected.      You have any new symptoms, such as swelling.      Bruises from an injury to your hand last longer than 2 weeks. Where can you learn more? Go to [email or web address] and sign in to your [employer] account. Enter R273 in the [facility] box to learn more about \"Hand Pain: Care Instructions. \"     If you do not have an account, please click on the \"Sign Up Now\" link. Current as of: July 1, 2021               Content Version: 13.2  © 6390-4480 [employer]. Care instructions adapted under license by [facility] ([facility]).  If you have questions about a medical condition or this instruction, always ask your healthcare professional. [address] [address] any warranty or liability for your use of this information. -Xray today; will call with results  -Use ice as needed - use only 15 minutes at a time  -Encouraged adequate rest  -Recommended compression/bracing  -Recommended elevation of the area  -Patient may use ibuprofen or tylenol for pain unless contraindicated  -The patient is to follow up with PCP or return to clinic if symptoms worsen/fail to improve.         Electronically signed by TORY Hart CNP on 4/26/2022 at 10:47 AM

## 2022-04-26 NOTE — PATIENT INSTRUCTIONS
Patient Education        Hand Pain: Care Instructions  Your Care Instructions     Common causes of hand pain are overuse and injuries, such as might happen during sports or home repair projects. Everyday wear and tear, especially asyou get older, also can cause hand pain. Most minor hand injuries will heal on their own, and home treatment is usually all you need to do. If you have sudden and severe pain, you may need tests andtreatment. Follow-up care is a key part of your treatment and safety. Be sure to make and go to all appointments, and call your doctor if you are having problems. It's also a good idea to know your test results and keep alist of the medicines you take. How can you care for yourself at home?  Take pain medicines exactly as directed. ? If the doctor gave you a prescription medicine for pain, take it as prescribed. ? If you are not taking a prescription pain medicine, ask your doctor if you can take an over-the-counter medicine.  Rest and protect your hand. Take a break from any activity that may cause pain.  Put ice or a cold pack on your hand for 10 to 20 minutes at a time. Put a thin cloth between the ice and your skin.  Prop up the sore hand on a pillow when you ice it or anytime you sit or lie down during the next 3 days. Try to keep it above the level of your heart. This will help reduce swelling.  If your doctor recommends a sling, splint, or elastic bandage to support your hand, wear it as directed. When should you call for help? Call 911 anytime you think you may need emergency care. For example, call if:     Your hand turns cool or pale or changes color. Call your doctor now or seek immediate medical care if:     You cannot move your hand.      Your hand pops, moves out of its normal position, and then returns to its normal position.      You have signs of infection, such as:  ? Increased pain, swelling, warmth, or redness.   ? Red streaks leading from the sore area.  ? Pus draining from a place on your hand. ? A fever.      Your hand feels numb or tingly. Watch closely for changes in your health, and be sure to contact your doctor if:     Your hand feels unstable when you try to use it.      You do not get better as expected.      You have any new symptoms, such as swelling.      Bruises from an injury to your hand last longer than 2 weeks. Where can you learn more? Go to [email or web address] and sign in to your [employer]uke account. Enter R273 in the Tynker box to learn more about \"Hand Pain: Care Instructions. \"     If you do not have an account, please click on the \"Sign Up Now\" link. Current as of: July 1, 2021               Content Version: 13.2  © 4514-2143 [employer]wise, Incorporated. Care instructions adapted under license by [facility] ([facility]). If you have questions about a medical condition or this instruction, always ask your healthcare professional. [place] [address] any warranty or liability for your use of this information. -Xray today; will call with results  -Use ice as needed - use only 15 minutes at a time  -Encouraged adequate rest  -Recommended compression/bracing  -Recommended elevation of the area  -Patient may use ibuprofen or tylenol for pain unless contraindicated  -The patient is to follow up with PCP or return to clinic if symptoms worsen/fail to improve.

## 2022-06-23 ENCOUNTER — HOSPITAL ENCOUNTER (OUTPATIENT)
Dept: GENERAL RADIOLOGY | Age: 82
Discharge: HOME | End: 2022-06-23
Payer: MEDICARE

## 2022-06-23 DIAGNOSIS — R07.9 CHEST PAIN, UNSPECIFIED TYPE: ICD-10-CM

## 2022-06-23 PROCEDURE — 71046 X-RAY EXAM CHEST 2 VIEWS: CPT

## 2022-06-23 PROCEDURE — 71046 X-RAY EXAM CHEST 2 VIEWS: CPT | Performed by: RADIOLOGY

## 2022-12-13 ENCOUNTER — TELEPHONE (OUTPATIENT)
Dept: RADIATION ONCOLOGY | Facility: HOSPITAL | Age: 82
End: 2022-12-13

## 2022-12-13 NOTE — TELEPHONE ENCOUNTER
Voicemail left for patient to return my call to schedule an appointment with Dr. Franklin Escobar.

## 2022-12-27 PROBLEM — Z92.3 HISTORY OF RADIATION THERAPY: Status: ACTIVE | Noted: 2022-12-27

## 2022-12-27 PROBLEM — Z78.9 NON-SMOKER: Status: ACTIVE | Noted: 2022-12-27

## 2022-12-29 ENCOUNTER — DOCUMENTATION (OUTPATIENT)
Dept: RADIATION ONCOLOGY | Facility: HOSPITAL | Age: 82
End: 2022-12-29

## 2022-12-29 ENCOUNTER — HOSPITAL ENCOUNTER (OUTPATIENT)
Dept: RADIATION ONCOLOGY | Facility: HOSPITAL | Age: 82
Setting detail: RADIATION/ONCOLOGY SERIES
End: 2022-12-29
Payer: MEDICARE

## 2022-12-29 ENCOUNTER — CONSULT (OUTPATIENT)
Dept: RADIATION ONCOLOGY | Facility: HOSPITAL | Age: 82
End: 2022-12-29
Payer: MEDICARE

## 2022-12-29 VITALS
HEIGHT: 72 IN | DIASTOLIC BLOOD PRESSURE: 87 MMHG | WEIGHT: 161 LBS | OXYGEN SATURATION: 96 % | BODY MASS INDEX: 21.81 KG/M2 | RESPIRATION RATE: 18 BRPM | HEART RATE: 66 BPM | SYSTOLIC BLOOD PRESSURE: 169 MMHG

## 2022-12-29 DIAGNOSIS — C77.9 METASTATIC SQUAMOUS CELL CARCINOMA INVOLVING LYMPH NODE WITH UNKNOWN PRIMARY SITE: ICD-10-CM

## 2022-12-29 DIAGNOSIS — C80.1 METASTATIC SQUAMOUS CELL CARCINOMA INVOLVING LYMPH NODE WITH UNKNOWN PRIMARY SITE: ICD-10-CM

## 2022-12-29 DIAGNOSIS — Z78.9 NON-SMOKER: ICD-10-CM

## 2022-12-29 DIAGNOSIS — C61 PROSTATE CANCER: Primary | ICD-10-CM

## 2022-12-29 DIAGNOSIS — Z92.3 HISTORY OF RADIATION THERAPY: ICD-10-CM

## 2022-12-29 PROCEDURE — G0463 HOSPITAL OUTPT CLINIC VISIT: HCPCS | Performed by: RADIOLOGY

## 2022-12-29 RX ORDER — DOXYCYCLINE HYCLATE 50 MG/1
50 CAPSULE ORAL 2 TIMES DAILY
COMMUNITY
Start: 2022-09-07

## 2022-12-29 NOTE — PROGRESS NOTES
FREDDY met with Mr. Palma who is here for a radiation consultation for prostate cancer. SW introduced self and explained role and source of support. He is 82 years old and lives with his spouse. He does not have any financial or transportation concerns. Mr. Palma states he is anxious to hear the recommendations and is worried about the possible side effects from radiation. Mr. 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 Emotional support provided. FREDDY did discuss caregiver options along with coping strategies. He does not take any medication for anxiety or depression and does not see a counselor. Mr. Palma states he tries to stay active and will play golf, hunt, fish, and jog. Mr. Palma denies needing assistance but SW encouraged him to call if assistance is needed in the future.

## 2023-01-17 NOTE — PROGRESS NOTES
Chief Complaint  Prostate cancer    Subjective          Don NIYAH Palma presents to [facility] [place]   This patient was initially evaluated for an elevated PSA of 5.0.  Ultimately elected watchful waiting at that point and then PSA went up to 7.2.  He saw  who recommended prostate MRI.  This showed a lesion in the right lateral peripheral zone at the apex graded as a PI-RADS 3 lesion.  Biopsy did reveal that area to have Metcalf grade 4+3 = 7 in 5% of the core.  The remaining 11 cores were negative.  Patient opted for SBRT.  He is here to arrange placement of fiduciary seeds and perirectal spacer application (Space OAR)        Current Outpatient Medications:   •  atorvastatin (LIPITOR) 10 MG tablet, Take 1 tablet by mouth Every Night., Disp: 30 tablet, Rfl: 5  •  Dabigatran Etexilate Mesylate (PRADAXA PO), Take 150 mg by mouth 2 (Two) Times a Day., Disp: , Rfl:   •  dorzolamide-timolol (COSOPT) 22.3-6.8 MG/ML ophthalmic solution, , Disp: , Rfl:   •  doxycycline (VIBRAMYCIN) 100 MG capsule, Take 50 mg by mouth 2 (Two) Times a Day., Disp: , Rfl:   •  doxycycline (VIBRAMYCIN) 50 MG capsule, Take 50 mg by mouth 2 (Two) Times a Day. Rosacea, Disp: , Rfl:   •  latanoprost (XALATAN) 0.005 % ophthalmic solution, INSTILL ONE DROP INTO EACH EYE QHS, Disp: , Rfl: 0  •  lisinopril-hydrochlorothiazide (PRINZIDE,ZESTORETIC) 20-12.5 MG per tablet, Take 1 tablet by mouth Daily., Disp: , Rfl:   •  Multiple Vitamins-Minerals (MULTIVITAMIN ADULT PO), Take  by mouth., Disp: , Rfl:   •  brimonidine (ALPHAGAN) 0.2 % ophthalmic solution, INSTILL ONE DROP INTO EACH EYE BID, Disp: , Rfl: 0  Past Medical History:   Diagnosis Date   • Arrhythmia    • Atrial fibrillation (HCC)    • Body mass index (BMI) of 23.0-23.9 in adult    • Cancer (HCC)     basil cell carcinoma    • Elevated PSA March 2022   • Glaucoma    • Hearing loss    • Hyperlipidemia    • Hypertension    • Multiple actinic keratoses    • Prostate  "cancer (HCC) Nov. 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 ENDOSCOPY;  Service: Gastroenterology   • KNEE ARTHROSCOPY Bilateral    • MOHS SURGERY Right 09/2016    Right Neck and Right Leg   • MOHS SURGERY Right 2016    Ear   • NECK SURGERY     • ORIF ANKLE FRACTURE Left    • OTHER SURGICAL HISTORY  2013    electrodesiccation and curettage of scalp   • OTHER SURGICAL HISTORY  2011    electrodesiccation and curettage of left lower extremity   • PAROTIDECTOMY Left     left superficial parotidectomy/left posterolateral neck dissection/resection of prior scar/ligation of left chle leak   • SKIN GRAFT      righ ear    • VASECTOMY  1970           Review of Systems  Review  of systems  Constitutional: Negative for chills and fever.   Gastrointestinal: Negative for abdominal pain, anal bleeding and blood in stool.   Genitourinary: Negative for flank pain and hematuria.    Objective   PHYSICAL EXAM  Vital Signs:   Temp 97.8 °F (36.6 °C)   Ht 182.9 cm (72\")   Wt 73.3 kg (161 lb 9.6 oz)   BMI 21.92 kg/m²     Physical Exam  Constitutional: Patient is without distress or deformity.  Vital signs are reviewed as above.    Neuro: No confusion; No disorientation; Alert and oriented  Pulmonary: No respiratory distress.   Skin: No pallor or diaphoresis  DATA  Result Review :              Results for orders placed or performed during the hospital encounter of 11/07/16 "   Comprehensive Metabolic Panel    Specimen: Arm, Left; Blood   Result Value Ref Range    Glucose 115 (H) 70 - 100 mg/dL    BUN 16 5 - 21 mg/dL    Creatinine 0.86 0.50 - 1.40 mg/dL    Sodium 141 135 - 145 mmol/L    Potassium 4.5 3.5 - 5.3 mmol/L    Chloride 101 98 - 110 mmol/L    CO2 29.0 24.0 - 31.0 mmol/L    Calcium 9.8 8.4 - 10.4 mg/dL    Total Protein 8.0 6.3 - 8.7 g/dL    Albumin 4.80 3.50 - 5.00 g/dL    ALT (SGPT) 35 0 - 54 U/L    AST (SGOT) 38 7 - 45 U/L    Alkaline Phosphatase 61 24 - 120 U/L    Total Bilirubin 1.3 (H) 0.1 - 1.0 mg/dL    eGFR Non African Amer 64 >60 mL/min/1.73    Globulin 3.2 gm/dL    A/G Ratio 1.5 1.1 - 2.5 g/dL    BUN/Creatinine Ratio 18.6 7.0 - 25.0    Anion Gap 11.0 4.0 - 13.0 mmol/L   Protime-INR    Specimen: Arm, Left; Blood   Result Value Ref Range    Protime 12.8 11.9 - 14.6 Seconds    INR 0.94 0.91 - 1.09   CBC Auto Differential    Specimen: Arm, Left; Blood   Result Value Ref Range    WBC 3.60 (L) 4.80 - 10.80 10*3/mm3    RBC 4.36 4.20 - 5.40 10*6/mm3    Hemoglobin 14.2 12.0 - 16.0 g/dL    Hematocrit 40.9 37.0 - 47.0 %    MCV 93.8 82.0 - 98.0 fL    MCH 32.6 (H) 28.0 - 32.0 pg    MCHC 34.7 33.0 - 36.0 g/dL    RDW 13.2 12.0 - 15.0 %    RDW-SD 45.0 40.0 - 54.0 fl    MPV 9.6 6.0 - 12.0 fL    Platelets 188 130 - 400 10*3/mm3    Neutrophil % 60.0 39.0 - 78.0 %    Lymphocyte % 29.2 15.0 - 45.0 %    Monocyte % 8.3 4.0 - 12.0 %    Eosinophil % 1.9 0.0 - 4.0 %    Basophil % 0.6 0.0 - 2.0 %    Immature Grans % 0.0 0.0 - 5.0 %    Neutrophils, Absolute 2.16 1.87 - 8.40 10*3/mm3    Lymphocytes, Absolute 1.05 0.72 - 4.86 10*3/mm3    Monocytes, Absolute 0.30 0.19 - 1.30 10*3/mm3    Eosinophils, Absolute 0.07 0.00 - 0.70 10*3/mm3    Basophils, Absolute 0.02 0.00 - 0.20 10*3/mm3    Immature Grans, Absolute 0.00 0.00 - 0.03 10*3/mm3   Hemoglobin A1c    Specimen: Blood   Result Value Ref Range    Hemoglobin A1C 5.6 %   Lipid Panel    Specimen: Blood   Result Value Ref Range    Total Cholesterol 170  130 - 200 mg/dL    Triglycerides 105 0 - 149 mg/dL    HDL Cholesterol 53 >=40 mg/dL    LDL Cholesterol  94 0 - 99 mg/dL    LDL/HDL Ratio 1.81    C-reactive Protein    Specimen: Blood   Result Value Ref Range    C-Reactive Protein <0.50 0.00 - 0.99 mg/dL   Folate    Specimen: Blood   Result Value Ref Range    Folate >20.00 >2.75 ng/mL   Sedimentation Rate    Specimen: Blood   Result Value Ref Range    Sed Rate 9 0 - 15 mm/hr   TSH    Specimen: Blood   Result Value Ref Range    TSH 1.540 0.470 - 4.680 mIU/mL   Vitamin B12    Specimen: Blood   Result Value Ref Range    Vitamin B-12 486 239 - 931 pg/mL   RPR    Specimen: Blood   Result Value Ref Range    RPR Non Reactive Non Reactive   POC Glucose Fingerstick    Specimen: Blood   Result Value Ref Range    Glucose 115 70 - 130 mg/dL   Adult Transthoracic Echo Complete   Result Value Ref Range    BSA 2.0 m^2    IVSd 0.95 cm    LVIDd 4.2 cm    LVIDs 3.1 cm    LVPWd 1.1 cm    IVS/LVPW 0.88     FS 27.4 %    EDV(Teich) 78.6 ml    ESV(Teich) 36.4 ml    EF(Teich) 53.6 %    EDV(cubed) 74.1 ml    ESV(cubed) 28.4 ml    EF(cubed) 61.7 %    LV mass(C)d 138.7 grams    LV mass(C)dI 70.6 grams/m^2    SV(Teich) 42.1 ml    SI(Teich) 21.5 ml/m^2    SV(cubed) 45.7 ml    SI(cubed) 23.3 ml/m^2    Ao root diam 3.8 cm    Ao root area 11.3 cm^2    LA dimension (2D)  2.8 cm    LA/Ao 0.74     LVOT diam 2.2 cm    LVOT area 3.8 cm^2    LVOT area(traced) 3.8 cm^2    LVLd ap4 6.9 cm    EDV(MOD-sp4) 34.5 ml    LVLs ap4 6.3 cm    ESV(MOD-sp4) 16.1 ml    EF(MOD-sp4) 53.3 %    SV(MOD-sp4) 18.4 ml    SI(MOD-sp4) 9.4 ml/m^2    Ao root area (BSA corrected) 1.9     EF - Contrast (4Ch) 53.3 ml/m^2    LV Christie Vol (BSA corrected) 17.6 ml/m^2    LV Sys Vol (BSA corrected) 8.2 ml/m^2    MV E max vangie 93.1 cm/sec    MV dec time 0.21 sec    Ao pk vangie 121.0 cm/sec    Ao max PG 5.9 mmHg    Ao max PG (full) 4.0 mmHg    Ao V2 mean 78.4 cm/sec    Ao mean PG 3.0 mmHg    Ao mean PG (full) 2.0 mmHg    Ao V2 VTI 17.9 cm     KEVIN(I,A) 2.4 cm^2    KEVIN(I,D) 2.4 cm^2    KEVIN(V,A) 2.2 cm^2    KEVIN(V,D) 2.2 cm^2    LV V1 max PG 1.9 mmHg    LV V1 mean PG 1.0 mmHg    LV V1 max 68.7 cm/sec    LV V1 mean 39.9 cm/sec    LV V1 VTI 11.3 cm    SV(Ao) 203.0 ml    SI(Ao) 103.4 ml/m^2    SV(LVOT) 43.0 ml    SI(LVOT) 21.9 ml/m^2    TR max vangie 259.0 cm/sec    RVSP(TR) 36.8 mmHg    RAP systole 10.0 mmHg     CV ECHO GABRIEL - BZI_BMI 22.4 kilograms/m^2     CV ECHO GABRIEL - BSA(HAYCOCK) 1.9 m^2     CV ECHO GABRIEL - BZI_METRIC_WEIGHT 74.8 kg     CV ECHO GABRIEL - BZI_METRIC_HEIGHT 182.9 cm   Light Blue Top   Result Value Ref Range    Extra Tube hold for add-on    Green Top (Gel)   Result Value Ref Range    Extra Tube Hold for add-ons.    Lavender Top   Result Value Ref Range    Extra Tube hold for add-on    Red Top   Result Value Ref Range    Extra Tube Hold for add-ons.                         ASSESSMENT AND PLAN          Problem List Items Addressed This Visit        Hematology and Neoplasia    Prostate cancer (HCC) - Primary    Relevant Orders    Case Request (Completed)   I explained that perirectal hydrogel spacer before the scheduled prostate radiation therapy treatment regimen to treat the patients localized prostate cancer. The purpose of placing this perirectal spacer is to temporarily position the anterior rectal wall away from the prostate during the course of the patient’s radiotherapy, thus reducing the radiation dose delivered to the anterior rectum. Such patients are at risk for developing rectal toxicity due to radiotherapy especially those with risk factors such as: comorbidities, medication, age, heritage, smoking, and body habitus. These factors have been shown to decrease the risk of rectal toxicity, in patients who are at risk of developing long term rectal side effects.      The benefits include a reduction in rectal pain throughout the course of radiotherapy, lower rates of long-term rectal toxicity, and fewer patients experiencing declines  in bowel quality of life (QOL).  It is important to note that the spacer material remains intact during the course of radiation therapy (approximately 3 months), after which it liquefies and is naturally absorbed and cleared in the patient’s urine within 6 months. The SpaceOAR System hydrogel is composed of water and polyethlyne glycol (PEG) a compound used widely in pharmaceuticals and cosmetics due to its high level of biocompatibility, lack of toxicity, and long term safety profile.    Several studies have been published on clinical outcomes of perirectal spacer applications.  The most robust of these studies was conducted to assess the SpaceOAR System safety and effectiveness. This study was a prospective, randomized, controlled, patient-blinded clinical study in 20 [place] centers on 222 men with low and intermediate risk prostate cancer.  In a landmark multi-institutional prospective randomized trial, prostate cancer patients being treated with Intensity Modulated Radiation Therapy (IMRT) with daily Image Guidance (IG) were randomized to either receive SpaceOAR or to be treated without SpaceOAR. The trial demonstrated that there was a 73% reduction in meaningful dose to the rectum in the SpaceOAR patients. On long-term follow-up, the perirectal hydrogel spacer patients had a 71% reduction in late rectal toxicity severity when compared to the control patients. The FDA cleared the use of the perirectal hydrogel spacer in patients being treated with radiation therapy for prostate cancer based on this data.      Most recently, 3-year results from the SpaceOAR System Prostate Cancer [place] Pivotal Clinical Trial entitled “Continued Benefit to Rectal Separation for Prostate RT: Final Results of a Phase III Trial”, the study results demonstrated significantly lower rectal toxicity and higher patient bowel quality of life (QOL) scores when the SpaceOAR System was applied prior to radiotherapy as compared to the trial Control  patients.Following radiotherapy through 3 years, none of the SpaceOAR patients experienced grade 2 (complications that required treatment such as medication, enemas, surgery either endoscopic or open) late rectal toxicity compared to 5.7% in the group that did not receive it.  There is also some suggestion that there was improved bowel function as measured by quality of life.  Unexpectedly, the patients that received the perirectal spacer showed benefits and urinary complications and quality of life is well.    I explained the importance of these studies including what they mean to the best of my ability to the patient.     ReportS submitted to the  and [employer] [facility] [employer] ([facility]) has reported 25 cases between January 2015 to March 2019 .  Unique major complications including acute pulmonary embolism, severe anaphylaxis, prostatic abscess and sepsis, purulent perineal drainage, rectal wall erosion, and rectourethral fistula were reported.  Bleeding severe enough to cause hematoma which could require drainage is also discussed these were discussed with patient.He has consented to the procedure.     Lastly we discussed the placement of prostate fiduciary markers.  These are gold seeds that are put in just prior to the introduction of the SpaceOAR.  This does assist the process of image guided IMRT.  It is felt that this lessens the effect on surrounding tissue from the radiation without decreasing the success of EBRT.  The only unique complication to this would be migration of seed potentially to the lungs.      FOLLOW UP     No follow-ups on file.        (Please note that portions of this note were completed with a voice recognition program.)  Jorge Alberto Thurston MD  01/19/23  11:21 CST

## 2023-01-19 ENCOUNTER — OFFICE VISIT (OUTPATIENT)
Dept: UROLOGY | Facility: CLINIC | Age: 83
End: 2023-01-19
Payer: MEDICARE

## 2023-01-19 VITALS — BODY MASS INDEX: 21.89 KG/M2 | TEMPERATURE: 97.8 F | WEIGHT: 161.6 LBS | HEIGHT: 72 IN

## 2023-01-19 DIAGNOSIS — C61 PROSTATE CANCER: Primary | ICD-10-CM

## 2023-01-19 PROBLEM — R97.20 ELEVATED PSA: Status: ACTIVE | Noted: 2022-11-01

## 2023-01-19 LAB
BILIRUB BLD-MCNC: NEGATIVE MG/DL
CLARITY, POC: CLEAR
COLOR UR: YELLOW
GLUCOSE UR STRIP-MCNC: NEGATIVE MG/DL
KETONES UR QL: NEGATIVE
LEUKOCYTE EST, POC: NEGATIVE
NITRITE UR-MCNC: NEGATIVE MG/ML
PH UR: 7 [PH] (ref 5–8)
PROT UR STRIP-MCNC: NEGATIVE MG/DL
RBC # UR STRIP: NEGATIVE /UL
SP GR UR: 1.01 (ref 1–1.03)
UROBILINOGEN UR QL: NORMAL

## 2023-01-19 PROCEDURE — 81003 URINALYSIS AUTO W/O SCOPE: CPT | Performed by: UROLOGY

## 2023-01-19 PROCEDURE — 99203 OFFICE O/P NEW LOW 30 MIN: CPT | Performed by: UROLOGY

## 2023-01-25 ENCOUNTER — PRE-ADMISSION TESTING (OUTPATIENT)
Dept: PREADMISSION TESTING | Facility: HOSPITAL | Age: 83
End: 2023-01-25
Payer: MEDICARE

## 2023-01-25 VITALS
BODY MASS INDEX: 23.39 KG/M2 | DIASTOLIC BLOOD PRESSURE: 74 MMHG | SYSTOLIC BLOOD PRESSURE: 162 MMHG | RESPIRATION RATE: 16 BRPM | OXYGEN SATURATION: 100 % | HEIGHT: 70 IN | WEIGHT: 163.36 LBS | HEART RATE: 80 BPM

## 2023-01-25 LAB
ANION GAP SERPL CALCULATED.3IONS-SCNC: 9 MMOL/L (ref 5–15)
BUN SERPL-MCNC: 17 MG/DL (ref 8–23)
BUN/CREAT SERPL: 23 (ref 7–25)
CALCIUM SPEC-SCNC: 9.1 MG/DL (ref 8.6–10.5)
CHLORIDE SERPL-SCNC: 98 MMOL/L (ref 98–107)
CO2 SERPL-SCNC: 27 MMOL/L (ref 22–29)
CREAT SERPL-MCNC: 0.74 MG/DL (ref 0.76–1.27)
DEPRECATED RDW RBC AUTO: 44.7 FL (ref 37–54)
EGFRCR SERPLBLD CKD-EPI 2021: 89.9 ML/MIN/1.73
ERYTHROCYTE [DISTWIDTH] IN BLOOD BY AUTOMATED COUNT: 12.8 % (ref 12.3–15.4)
GLUCOSE SERPL-MCNC: 111 MG/DL (ref 65–99)
HCT VFR BLD AUTO: 39.8 % (ref 37.5–51)
HGB BLD-MCNC: 13.3 G/DL (ref 13–17.7)
MCH RBC QN AUTO: 31.8 PG (ref 26.6–33)
MCHC RBC AUTO-ENTMCNC: 33.4 G/DL (ref 31.5–35.7)
MCV RBC AUTO: 95.2 FL (ref 79–97)
PLATELET # BLD AUTO: 186 10*3/MM3 (ref 140–450)
PMV BLD AUTO: 9.7 FL (ref 6–12)
POTASSIUM SERPL-SCNC: 4 MMOL/L (ref 3.5–5.2)
RBC # BLD AUTO: 4.18 10*6/MM3 (ref 4.14–5.8)
SODIUM SERPL-SCNC: 134 MMOL/L (ref 136–145)
WBC NRBC COR # BLD: 5.59 10*3/MM3 (ref 3.4–10.8)

## 2023-01-25 PROCEDURE — 93005 ELECTROCARDIOGRAM TRACING: CPT

## 2023-01-25 PROCEDURE — 93010 ELECTROCARDIOGRAM REPORT: CPT | Performed by: INTERNAL MEDICINE

## 2023-01-25 PROCEDURE — 36415 COLL VENOUS BLD VENIPUNCTURE: CPT

## 2023-01-25 PROCEDURE — 85027 COMPLETE CBC AUTOMATED: CPT

## 2023-01-25 PROCEDURE — 80048 BASIC METABOLIC PNL TOTAL CA: CPT

## 2023-01-25 NOTE — DISCHARGE INSTRUCTIONS
Before you come to the hospital        Arrival time: AS DIRECTED BY OFFICE     YOU MAY TAKE THE FOLLOWING MEDICATION(S) THE MORNING OF SURGERY WITH A SIP OF WATER: none    **hold lisinopril/hctz 24 hours prior to surgery**           ALL OTHER HOME MEDICATION CHECK WITH YOUR PHYSICIAN (especially if   you are taking diabetes medicines or blood thinners)    Do not take any Erectile Dysfunction medications (EX: CIALIS, VIAGRA) 24 hours prior to surgery.      If you were given and instructed to use a germ- killing soap, use as directed the night before surgery and again the morning of surgery or as directed by your surgeon. (Use one-half of the bottle with each shower.)   See attached information for How to Use Chlorhexidine for Bathing if applicable.            Eating and drinking restrictions prior to scheduled arrival time    2 Hours before arrival time STOP   Drinking Clear liquids (water, apple juice-no pulp)     6 Hours before arrival time STOP   Milk or drinks that contain milk, full liquids    6 Hours before arrival time STOP   Light meals or foods, such as toast or cereal    8 Hours before arrival time STOP   Heavy foods, such as meat, fried foods, or fatty foods    (It is extremely important that you follow these guidelines to prevent delay or cancelation of your procedure)     Clear Liquids  Water and flavored water                                                                      Clear Fruit juices, such as cranberry juice and apple juice.  Black coffee (NO cream of any kind, including powdered).  Plain tea  Clear bouillon or broth.  Flavored gelatin.  Soda.  Gatorade or Powerade.  Full liquid examples  Juices that have pulp.  Frozen ice pops that contain fruit pieces.  Coffee with creamer  Milk.  Yogurt.                MANAGING PAIN AFTER SURGERY    We know you are probably wondering what your pain will be like after surgery.  Following surgery it is unrealistic to expect you will not have pain.   Pain  is how our bodies let us know that something is wrong or cautions us to be careful.  That said, our goal is to make your pain tolerable.    Methods we may use to treat your pain include (oral or IV medications, PCAs, epidurals, nerve blocks, etc.)   While some procedures require IV pain medications for a short time after surgery, transitioning to pain medications by mouth allows for better management of pain.   Your nurse will encourage you to take oral pain medications whenever possible.  IV medications work almost immediately, but only last a short while.  Taking medications by mouth allows for a more constant level of medication in your blood stream for a longer period of time.      Once your pain is out of control it is harder to get back under control.  It is important you are aware when your next dose of pain medication is due.  If you are admitted, your nurse may write the time of your next dose on the white board in your room to help you remember.      We are interested in your pain and encourage you to inform us about aggravating factors during your visit.   Many times a simple repositioning every few hours can make a big difference.    If your physician says it is okay, do not let your pain prevent you from getting out of bed. Be sure to call your nurse for assistance prior to getting up so you do not fall.      Before surgery, please decide your tolerable pain goal.  These faces help describe the pain ratings we use on a 0-10 scale.   Be prepared to tell us your goal and whether or not you take pain or anxiety medications at home.          Preparing for Surgery  Preparing for surgery is an important part of your care. It can make things go more smoothly and help you avoid complications. The steps leading up to surgery may vary among hospitals. Follow all instructions given to you by your health care providers. Ask questions if you do not understand something. Talk about any concerns that you have.  Here  are some questions to consider asking before your surgery:  If my surgery is not an emergency (is elective), when would be the best time to have the surgery?  What arrangements do I need to make for work, home, or school?  What will my recovery be like? How long will it be before I can return to normal activities?  Will I need to prepare my home? Will I need to arrange care for me or my children?  Should I expect to have pain after surgery? What are my pain management options? Are there nonmedical options that I can try for pain?  Tell a health care provider about:  Any allergies you have.  All medicines you are taking, including vitamins, herbs, eye drops, creams, and over-the-counter medicines.  Any problems you or family members have had with anesthetic medicines.  Any blood disorders you have.  Any surgeries you have had.  Any medical conditions you have.  Whether you are pregnant or may be pregnant.  What are the risks?  The risks and complications of surgery depend on the specific procedure that you have. Discuss all the risks with your health care providers before your surgery. Ask about common surgical complications, which may include:  Infection.  Bleeding or a need for blood replacement (transfusion).  Allergic reactions to medicines.  Damage to surrounding nerves, tissues, or structures.  A blood clot.  Scarring.  Failure of the surgery to correct the problem.  Follow these instructions before the procedure:  Several days or weeks before your procedure  You may have a physical exam by your primary health care provider to make sure it is safe for you to have surgery.  You may have testing. This may include a chest X-ray, blood and urine tests, electrocardiogram (ECG), or other testing.  Ask your health care provider about:  Changing or stopping your regular medicines. This is especially important if you are taking diabetes medicines or blood thinners.  Taking medicines such as aspirin and ibuprofen. These  medicines can thin your blood. Do not take these medicines unless your health care provider tells you to take them.  Taking over-the-counter medicines, vitamins, herbs, and supplements.  Do not use any products that contain nicotine or tobacco, such as cigarettes and e-cigarettes. If you need help quitting, ask your health care provider.  Avoid alcohol.  Ask your health care provider if there are exercises you can do to prepare for surgery.  Eat a healthy diet.   Plan to have someone take you home from the hospital or clinic.  Plan to have a responsible adult care for you for at least 24 hours after you leave the hospital or clinic. This is important.  The day before your procedure  You may be given antibiotic medicine to take by mouth to help prevent infection. Take it as told by your health care provider.  You may be asked to shower with a germ-killing soap.  Follow instructions from your health care provider about eating and drinking restrictions. This includes gum, mints and hard candy.  Pack comfortable clothes according to your procedure.   The day of your procedure  You may need to take another shower with a germ-killing soap before you leave home in the morning.  With a small sip of water, take only the medicines that you are told to take.  Remove all jewelry including rings.   Leave anything you consider valuable at home except hearing aids if needed.  You do not need to bring your home medications into the hospital.   Do not wear any makeup, nail polish, powder, deodorant, lotion, hair accessories, or anything on your skin or body except your clothes.  If you will be staying in the hospital, bring a case to hold your glasses, contacts, or dentures. You may also want to bring your robe and non-skid footwear.       (Do not use denture adhesives since you will be asked to remove them during  surgery).   If you wear oxygen at home, bring it with you the day of surgery.  If instructed by your health care  provider, bring your sleep apnea device with you on the day of your surgery (if this applies to you).  You may want to leave your suitcase and sleep apnea device in the car until after surgery.   Arrive at the hospital as scheduled.  Bring a friend or family member with you who can help to answer questions and be present while you meet with your health care provider.  At the hospital  When you arrive at the hospital:  Go to registration located at the main entrance of the hospital. You will be registered and given a beeper and a sticker sheet. Take the stickers to the Outpatient nurses desk and place in the black tray. This is to notify staff that you have arrived. Then return to the lobby to wait.   When your beeper lights up and vibrates proceed through the double doors, under the stairs, and a member of the Outpatient Surgery staff will escort you to your preoperative room.  You may have to wear compression sleeves. These help to prevent blood clots and reduce swelling in your legs.  An IV may be inserted into one of your veins.              In the operating room, you may be given one or more of the following:        A medicine to help you relax (sedative).        A medicine to numb the area (local anesthetic).        A medicine to make you fall asleep (general anesthetic).        A medicine that is injected into an area of your body to numb everything below the                      injection site (regional anesthetic).  You may be given an antibiotic through your IV to help prevent infection.  Your surgical site will be marked or identified.    Contact a health care provider if you:  Develop a fever of more than 100.4°F (38°C) or other feelings of illness during the 48 hours before your surgery.  Have symptoms that get worse.  Have questions or concerns about your surgery.  Summary  Preparing for surgery can make the procedure go more smoothly and lower your risk of complications.  Before surgery, make a list of  questions and concerns to discuss with your surgeon. Ask about the risks and possible complications.  In the days or weeks before your surgery, follow all instructions from your health care provider. You may need to stop smoking, avoid alcohol, follow eating restrictions, and change or stop your regular medicines.  Contact your surgeon if you develop a fever or other signs of illness during the few days before your surgery.  This information is not intended to replace advice given to you by your health care provider. Make sure you discuss any questions you have with your health care provider.  Document Revised: 12/21/2018 Document Reviewed: 10/23/2018  Elsevier Patient Education © 2021 [employer]

## 2023-01-26 LAB
QT INTERVAL: 388 MS
QTC INTERVAL: 388 MS

## 2023-02-01 ENCOUNTER — ANESTHESIA EVENT (OUTPATIENT)
Dept: PERIOP | Facility: HOSPITAL | Age: 83
End: 2023-02-01
Payer: MEDICARE

## 2023-02-01 ENCOUNTER — HOSPITAL ENCOUNTER (OUTPATIENT)
Facility: HOSPITAL | Age: 83
Setting detail: HOSPITAL OUTPATIENT SURGERY
Discharge: HOME | End: 2023-02-01
Attending: UROLOGY | Admitting: UROLOGY
Payer: MEDICARE

## 2023-02-01 ENCOUNTER — ANESTHESIA (OUTPATIENT)
Dept: PERIOP | Facility: HOSPITAL | Age: 83
End: 2023-02-01
Payer: MEDICARE

## 2023-02-01 VITALS
DIASTOLIC BLOOD PRESSURE: 91 MMHG | TEMPERATURE: 98.5 F | HEART RATE: 72 BPM | RESPIRATION RATE: 18 BRPM | SYSTOLIC BLOOD PRESSURE: 173 MMHG | OXYGEN SATURATION: 100 %

## 2023-02-01 DIAGNOSIS — C61 PROSTATE CANCER: ICD-10-CM

## 2023-02-01 PROCEDURE — 76872 US TRANSRECTAL: CPT | Performed by: UROLOGY

## 2023-02-01 PROCEDURE — 55874 TPRNL PLMT BIODEGRDABL MATRL: CPT | Performed by: UROLOGY

## 2023-02-01 PROCEDURE — 25010000002 DEXAMETHASONE PER 1 MG

## 2023-02-01 PROCEDURE — 25010000002 FENTANYL CITRATE (PF) 100 MCG/2ML SOLUTION

## 2023-02-01 PROCEDURE — 55876 PLACE RT DEVICE/MARKER PROS: CPT | Performed by: UROLOGY

## 2023-02-01 PROCEDURE — 25010000002 PROPOFOL 10 MG/ML EMULSION

## 2023-02-01 PROCEDURE — 25010000002 ONDANSETRON PER 1 MG

## 2023-02-01 PROCEDURE — 25010000002 CEFAZOLIN PER 500 MG: Performed by: UROLOGY

## 2023-02-01 PROCEDURE — A4648 IMPLANTABLE TISSUE MARKER: HCPCS | Performed by: UROLOGY

## 2023-02-01 DEVICE — IMPLANTABLE DEVICE
Type: IMPLANTABLE DEVICE | Site: PROSTATE | Status: FUNCTIONAL
Brand: GOLD FIDUCIAL

## 2023-02-01 RX ORDER — SODIUM CHLORIDE 9 MG/ML
40 INJECTION, SOLUTION INTRAVENOUS AS NEEDED
Status: DISCONTINUED | OUTPATIENT
Start: 2023-02-01 | End: 2023-02-01 | Stop reason: HOSPADM

## 2023-02-01 RX ORDER — SODIUM CHLORIDE 0.9 % (FLUSH) 0.9 %
3-10 SYRINGE (ML) INJECTION AS NEEDED
Status: DISCONTINUED | OUTPATIENT
Start: 2023-02-01 | End: 2023-02-01 | Stop reason: HOSPADM

## 2023-02-01 RX ORDER — HYDROCODONE BITARTRATE AND ACETAMINOPHEN 5; 325 MG/1; MG/1
1 TABLET ORAL EVERY 8 HOURS PRN
Qty: 6 TABLET | Refills: 0 | Status: SHIPPED | OUTPATIENT
Start: 2023-02-01

## 2023-02-01 RX ORDER — DROPERIDOL 2.5 MG/ML
0.62 INJECTION, SOLUTION INTRAMUSCULAR; INTRAVENOUS ONCE AS NEEDED
Status: DISCONTINUED | OUTPATIENT
Start: 2023-02-01 | End: 2023-02-01 | Stop reason: HOSPADM

## 2023-02-01 RX ORDER — LIDOCAINE HYDROCHLORIDE 10 MG/ML
0.5 INJECTION, SOLUTION EPIDURAL; INFILTRATION; INTRACAUDAL; PERINEURAL ONCE AS NEEDED
Status: DISCONTINUED | OUTPATIENT
Start: 2023-02-01 | End: 2023-02-01 | Stop reason: HOSPADM

## 2023-02-01 RX ORDER — LABETALOL HYDROCHLORIDE 5 MG/ML
5 INJECTION, SOLUTION INTRAVENOUS
Status: DISCONTINUED | OUTPATIENT
Start: 2023-02-01 | End: 2023-02-01 | Stop reason: HOSPADM

## 2023-02-01 RX ORDER — BUPIVACAINE HCL/0.9 % NACL/PF 0.1 %
2 PLASTIC BAG, INJECTION (ML) EPIDURAL ONCE
Status: COMPLETED | OUTPATIENT
Start: 2023-02-01 | End: 2023-02-01

## 2023-02-01 RX ORDER — FENTANYL CITRATE 50 UG/ML
INJECTION, SOLUTION INTRAMUSCULAR; INTRAVENOUS AS NEEDED
Status: DISCONTINUED | OUTPATIENT
Start: 2023-02-01 | End: 2023-02-01 | Stop reason: SURG

## 2023-02-01 RX ORDER — ACETAMINOPHEN 500 MG
1000 TABLET ORAL ONCE
Status: COMPLETED | OUTPATIENT
Start: 2023-02-01 | End: 2023-02-01

## 2023-02-01 RX ORDER — IBUPROFEN 600 MG/1
600 TABLET ORAL ONCE AS NEEDED
Status: DISCONTINUED | OUTPATIENT
Start: 2023-02-01 | End: 2023-02-01 | Stop reason: HOSPADM

## 2023-02-01 RX ORDER — FENTANYL CITRATE 50 UG/ML
25 INJECTION, SOLUTION INTRAMUSCULAR; INTRAVENOUS
Status: DISCONTINUED | OUTPATIENT
Start: 2023-02-01 | End: 2023-02-01 | Stop reason: HOSPADM

## 2023-02-01 RX ORDER — MIDAZOLAM HYDROCHLORIDE 1 MG/ML
0.5 INJECTION INTRAMUSCULAR; INTRAVENOUS
Status: DISCONTINUED | OUTPATIENT
Start: 2023-02-01 | End: 2023-02-01 | Stop reason: HOSPADM

## 2023-02-01 RX ORDER — LIDOCAINE HYDROCHLORIDE 20 MG/ML
INJECTION, SOLUTION EPIDURAL; INFILTRATION; INTRACAUDAL; PERINEURAL AS NEEDED
Status: DISCONTINUED | OUTPATIENT
Start: 2023-02-01 | End: 2023-02-01 | Stop reason: SURG

## 2023-02-01 RX ORDER — GLYCOPYRROLATE 0.2 MG/ML
INJECTION INTRAMUSCULAR; INTRAVENOUS AS NEEDED
Status: DISCONTINUED | OUTPATIENT
Start: 2023-02-01 | End: 2023-02-01 | Stop reason: SURG

## 2023-02-01 RX ORDER — ONDANSETRON 2 MG/ML
4 INJECTION INTRAMUSCULAR; INTRAVENOUS ONCE AS NEEDED
Status: DISCONTINUED | OUTPATIENT
Start: 2023-02-01 | End: 2023-02-01 | Stop reason: HOSPADM

## 2023-02-01 RX ORDER — ONDANSETRON 2 MG/ML
INJECTION INTRAMUSCULAR; INTRAVENOUS AS NEEDED
Status: DISCONTINUED | OUTPATIENT
Start: 2023-02-01 | End: 2023-02-01 | Stop reason: SURG

## 2023-02-01 RX ORDER — PROPOFOL 10 MG/ML
VIAL (ML) INTRAVENOUS AS NEEDED
Status: DISCONTINUED | OUTPATIENT
Start: 2023-02-01 | End: 2023-02-01 | Stop reason: SURG

## 2023-02-01 RX ORDER — HYDROCODONE BITARTRATE AND ACETAMINOPHEN 5; 325 MG/1; MG/1
1 TABLET ORAL ONCE AS NEEDED
Status: DISCONTINUED | OUTPATIENT
Start: 2023-02-01 | End: 2023-02-01 | Stop reason: HOSPADM

## 2023-02-01 RX ORDER — NALOXONE HCL 0.4 MG/ML
0.4 VIAL (ML) INJECTION AS NEEDED
Status: DISCONTINUED | OUTPATIENT
Start: 2023-02-01 | End: 2023-02-01 | Stop reason: HOSPADM

## 2023-02-01 RX ORDER — OXYCODONE AND ACETAMINOPHEN 7.5; 325 MG/1; MG/1
2 TABLET ORAL EVERY 4 HOURS PRN
Status: DISCONTINUED | OUTPATIENT
Start: 2023-02-01 | End: 2023-02-01 | Stop reason: HOSPADM

## 2023-02-01 RX ORDER — SODIUM CHLORIDE 0.9 % (FLUSH) 0.9 %
SYRINGE (ML) INJECTION AS NEEDED
Status: DISCONTINUED | OUTPATIENT
Start: 2023-02-01 | End: 2023-02-01 | Stop reason: HOSPADM

## 2023-02-01 RX ORDER — SODIUM CHLORIDE, SODIUM LACTATE, POTASSIUM CHLORIDE, CALCIUM CHLORIDE 600; 310; 30; 20 MG/100ML; MG/100ML; MG/100ML; MG/100ML
1000 INJECTION, SOLUTION INTRAVENOUS CONTINUOUS
Status: DISCONTINUED | OUTPATIENT
Start: 2023-02-01 | End: 2023-02-01 | Stop reason: HOSPADM

## 2023-02-01 RX ORDER — SODIUM CHLORIDE, SODIUM LACTATE, POTASSIUM CHLORIDE, CALCIUM CHLORIDE 600; 310; 30; 20 MG/100ML; MG/100ML; MG/100ML; MG/100ML
100 INJECTION, SOLUTION INTRAVENOUS CONTINUOUS
Status: DISCONTINUED | OUTPATIENT
Start: 2023-02-01 | End: 2023-02-01 | Stop reason: HOSPADM

## 2023-02-01 RX ORDER — DEXAMETHASONE SODIUM PHOSPHATE 4 MG/ML
INJECTION, SOLUTION INTRA-ARTICULAR; INTRALESIONAL; INTRAMUSCULAR; INTRAVENOUS; SOFT TISSUE AS NEEDED
Status: DISCONTINUED | OUTPATIENT
Start: 2023-02-01 | End: 2023-02-01 | Stop reason: SURG

## 2023-02-01 RX ORDER — OXYCODONE AND ACETAMINOPHEN 10; 325 MG/1; MG/1
1 TABLET ORAL ONCE AS NEEDED
Status: DISCONTINUED | OUTPATIENT
Start: 2023-02-01 | End: 2023-02-01 | Stop reason: HOSPADM

## 2023-02-01 RX ORDER — FLUMAZENIL 0.1 MG/ML
0.2 INJECTION INTRAVENOUS AS NEEDED
Status: DISCONTINUED | OUTPATIENT
Start: 2023-02-01 | End: 2023-02-01 | Stop reason: HOSPADM

## 2023-02-01 RX ORDER — SODIUM CHLORIDE 0.9 % (FLUSH) 0.9 %
3 SYRINGE (ML) INJECTION AS NEEDED
Status: DISCONTINUED | OUTPATIENT
Start: 2023-02-01 | End: 2023-02-01 | Stop reason: HOSPADM

## 2023-02-01 RX ORDER — SODIUM CHLORIDE 0.9 % (FLUSH) 0.9 %
3 SYRINGE (ML) INJECTION EVERY 12 HOURS SCHEDULED
Status: DISCONTINUED | OUTPATIENT
Start: 2023-02-01 | End: 2023-02-01 | Stop reason: HOSPADM

## 2023-02-01 RX ADMIN — LIDOCAINE HYDROCHLORIDE 100 MG: 20 INJECTION, SOLUTION EPIDURAL; INFILTRATION; INTRACAUDAL; PERINEURAL at 08:00

## 2023-02-01 RX ADMIN — SODIUM CHLORIDE, POTASSIUM CHLORIDE, SODIUM LACTATE AND CALCIUM CHLORIDE 1000 ML: 600; 310; 30; 20 INJECTION, SOLUTION INTRAVENOUS at 05:58

## 2023-02-01 RX ADMIN — ACETAMINOPHEN 1000 MG: 500 TABLET, FILM COATED ORAL at 06:38

## 2023-02-01 RX ADMIN — GLYCOPYRROLATE 0.2 MG: 0.2 INJECTION INTRAMUSCULAR; INTRAVENOUS at 08:05

## 2023-02-01 RX ADMIN — GLYCOPYRROLATE 0.2 MG: 0.2 INJECTION INTRAMUSCULAR; INTRAVENOUS at 08:12

## 2023-02-01 RX ADMIN — PROPOFOL INJECTABLE EMULSION 120 MG: 10 INJECTION, EMULSION INTRAVENOUS at 08:00

## 2023-02-01 RX ADMIN — ONDANSETRON 4 MG: 2 INJECTION INTRAMUSCULAR; INTRAVENOUS at 08:18

## 2023-02-01 RX ADMIN — DEXAMETHASONE SODIUM PHOSPHATE 4 MG: 4 INJECTION, SOLUTION INTRA-ARTICULAR; INTRALESIONAL; INTRAMUSCULAR; INTRAVENOUS; SOFT TISSUE at 08:18

## 2023-02-01 RX ADMIN — FENTANYL CITRATE 100 MCG: 50 INJECTION INTRAMUSCULAR; INTRAVENOUS at 08:04

## 2023-02-01 RX ADMIN — Medication 2 G: at 08:06

## 2023-02-01 NOTE — OP NOTE
Operative Summary    Elliot Palma  Date of Procedure: 2/1/2023    Pre-op Diagnosis:   Prostate cancer (HCC) [C61]    Post-op Diagnosis:     Post-Op Diagnosis Codes:     * Prostate cancer (HCC) [C61]    Procedure/CPT® Codes:      Procedure(s):  TRANSRECTAL ULTRASOUND GUIDED PLACEMENT of PROSTATE FIDUCIARY MARKERS    Surgeon(s):  Jorge Alberto Thurston MD    Anesthesia: General    Staff:   Circulator: Tiffanie Mcgowan RN; Ernie Kessler RN  Scrub Person: Joseph Mitchell; Shelia Cole    Indications for procedure:   Patient with prostate cancer that is opted for external beam radiotherapy as primary curative treatment.    Procedure details:  After appropriate anesthesia, positioning, prep and drape, timeout protocol was observed.     Patient is positioned in the dorsal lithotomy position and the perineal skin is prepped with povidone-iodine per the standard practice. Sterile drapes are then placed on the patient’s legs, genitals, ultrasound probe and the stepper.  At this point I placed the fiduciary seeds.  Local anesthetic with lidocaine is injected to anesthetize the tracts.  Using both transverse sagittal imaging the needles that contained the seeds were inserted in the extreme left of the gland, just to the right of the urethra at the base and just to the right of the urethra at the apex.     With patient in same position I did attempt to place Space OAR.  However the patient has a large rectal hump that obliterates the space between the rectum and the prostate for approximately 4 cm.  I tried multiple things to increase this space from manipulating the brachii stand to adjusting the patient's degree of dorsal lithotomy.  I did take a long spinal needle with some saline and passed this to try to create space with the saline in between but was unsuccessful in doing so.  It is my opinion that placement of Space OAR would be impossible to get between the rectum and prostate with likely injection into the prostatic  capsule or the serosa of the rectum.  Therefore I just placed markers and did not put Space OAR in this gentleman.      Estimated Blood Loss: Less than 30 mL    Specimens:                None      Drains: none    Complications: none    Plan: Patient will begin radiation therapy per radiation oncologist plan.      (Please note that portions of this note were completed with a voice recognition program.)  Jorge Alberto Thurston MD     Date: 2/1/2023  Time: 08:39 CST  00

## 2023-02-01 NOTE — ANESTHESIA PREPROCEDURE EVALUATION
Anesthesia Evaluation     Patient summary reviewed   no history of anesthetic complications:  NPO Solid Status: > 8 hours  NPO Liquid Status: > 2 hours           Airway   Mallampati: I  TM distance: >3 FB  Neck ROM: full  Dental          Pulmonary    (-) asthma, recent URI, sleep apnea, not a smoker  Cardiovascular   Exercise tolerance: good (4-7 METS)    (+) hypertension, dysrhythmias Atrial Fib, hyperlipidemia,   (-) pacemaker, past MI, angina, cardiac stents      Neuro/Psych  (+) CVA (2016, no residual symptoms),    (-) seizures, TIA  GI/Hepatic/Renal/Endo    (-) GERD, liver disease, no renal disease, diabetes    Musculoskeletal     Abdominal    Substance History      OB/GYN          Other      history of cancer                      Anesthesia Plan    ASA 3     general     (pradaxa held )  intravenous induction     Anesthetic plan, risks, benefits, and alternatives have been provided, discussed and informed consent has been obtained with: patient.

## 2023-02-01 NOTE — ANESTHESIA PROCEDURE NOTES
Airway  Urgency: elective    Date/Time: 2/1/2023 8:01 AM  Airway not difficult    General Information and Staff    Patient location during procedure: OR  CRNA/CAA: Caleb Jones CRNA    Indications and Patient Condition  Indications for airway management: airway protection    Preoxygenated: yes  Mask difficulty assessment: 0 - not attempted    Final Airway Details  Final airway type: supraglottic airway      Successful airway: I-gel  Size 4     Number of attempts at approach: 1  Assessment: lips, teeth, and gum same as pre-op

## 2023-02-01 NOTE — ANESTHESIA POSTPROCEDURE EVALUATION
Patient: Elliot Palma    Procedure Summary     Date: 02/01/23 Room / Location: [facility] OR  /  [facility] OR    Anesthesia Start: 0756 Anesthesia Stop: 0832    Procedure: TRANSRECTAL ULTRASOUND GUIDED PLACEMENT of PROSTATE FIDUCIARY MARKERS (Rectum) Diagnosis:       Prostate cancer (HCC)      (Prostate cancer (HCC) [C61])    Surgeons: Jorge Alberto Thurston MD Provider: Caleb Jones CRNA    Anesthesia Type: general ASA Status: 3          Anesthesia Type: general    Vitals  Vitals Value Taken Time   /83 02/01/23 0855   Temp 98.5 °F (36.9 °C) 02/01/23 0850   Pulse 79 02/01/23 0855   Resp 18 02/01/23 0855   SpO2 99 % 02/01/23 0855           Post Anesthesia Care and Evaluation    Patient location during evaluation: PACU  Patient participation: complete - patient participated  Level of consciousness: awake and alert  Pain management: adequate    Airway patency: patent  Anesthetic complications: No anesthetic complications  PONV Status: none  Cardiovascular status: acceptable and hemodynamically stable  Respiratory status: acceptable  Hydration status: acceptable    Comments: Blood pressure 149/83, pulse 79, temperature 98.5 °F (36.9 °C), temperature source Temporal, resp. rate 18, SpO2 99 %.    Patient discharged from PACU based upon Mounika score. Please see RN notes for further details

## 2023-02-07 ENCOUNTER — HOSPITAL ENCOUNTER (OUTPATIENT)
Dept: RADIATION ONCOLOGY | Facility: HOSPITAL | Age: 83
Setting detail: RADIATION/ONCOLOGY SERIES
End: 2023-02-07
Payer: MEDICARE

## 2023-02-08 ENCOUNTER — HOSPITAL ENCOUNTER (OUTPATIENT)
Dept: RADIATION ONCOLOGY | Facility: HOSPITAL | Age: 83
Setting detail: RADIATION/ONCOLOGY SERIES
Discharge: HOME | End: 2023-02-08
Payer: MEDICARE

## 2023-02-08 LAB
RAD ONC ARIA COURSE END DATE: NORMAL
RAD ONC ARIA COURSE ID: NORMAL
RAD ONC ARIA COURSE INTENT: NORMAL
RAD ONC ARIA COURSE LAST TREATMENT DATE: NORMAL
RAD ONC ARIA COURSE START DATE: NORMAL
RAD ONC ARIA COURSE TREATMENT ELAPSED DAYS: 44
RAD ONC ARIA FIRST TREATMENT DATE: NORMAL
RAD ONC ARIA PLAN FRACTIONS TREATED TO DATE: 28
RAD ONC ARIA PLAN FRACTIONS TREATED TO DATE: 5
RAD ONC ARIA PLAN ID: NORMAL
RAD ONC ARIA PLAN ID: NORMAL
RAD ONC ARIA PLAN NAME: NORMAL
RAD ONC ARIA PLAN NAME: NORMAL
RAD ONC ARIA PLAN PRESCRIBED DOSE PER FRACTION: 1.8 GY
RAD ONC ARIA PLAN PRESCRIBED DOSE PER FRACTION: 2 GY
RAD ONC ARIA PLAN PRIMARY REFERENCE POINT: NORMAL
RAD ONC ARIA PLAN PRIMARY REFERENCE POINT: NORMAL
RAD ONC ARIA PLAN TOTAL FRACTIONS PRESCRIBED: 28
RAD ONC ARIA PLAN TOTAL FRACTIONS PRESCRIBED: 5
RAD ONC ARIA PLAN TOTAL PRESCRIBED DOSE: 1000 CGY
RAD ONC ARIA PLAN TOTAL PRESCRIBED DOSE: 5040 CGY
RAD ONC ARIA REFERENCE POINT DOSAGE GIVEN TO DATE: 44.51 GY
RAD ONC ARIA REFERENCE POINT DOSAGE GIVEN TO DATE: 60.4 GY
RAD ONC ARIA REFERENCE POINT ID: NORMAL
RAD ONC ARIA REFERENCE POINT ID: NORMAL

## 2023-02-08 PROCEDURE — 77334 RADIATION TREATMENT AID(S): CPT | Performed by: RADIOLOGY

## 2023-02-15 LAB
RAD ONC ARIA COURSE END DATE: NORMAL
RAD ONC ARIA COURSE ID: NORMAL
RAD ONC ARIA COURSE INTENT: NORMAL
RAD ONC ARIA COURSE LAST TREATMENT DATE: NORMAL
RAD ONC ARIA COURSE START DATE: NORMAL
RAD ONC ARIA COURSE TREATMENT ELAPSED DAYS: 0
RAD ONC ARIA FIRST TREATMENT DATE: NORMAL
RAD ONC ARIA PLAN FRACTIONS TREATED TO DATE: 1
RAD ONC ARIA PLAN FRACTIONS TREATED TO DATE: 1
RAD ONC ARIA PLAN ID: NORMAL
RAD ONC ARIA PLAN ID: NORMAL
RAD ONC ARIA PLAN NAME: NORMAL
RAD ONC ARIA PLAN NAME: NORMAL
RAD ONC ARIA PLAN PRESCRIBED DOSE PER FRACTION: 1.31 GY
RAD ONC ARIA PLAN PRESCRIBED DOSE PER FRACTION: 2 GY
RAD ONC ARIA PLAN PRIMARY REFERENCE POINT: NORMAL
RAD ONC ARIA PLAN PRIMARY REFERENCE POINT: NORMAL
RAD ONC ARIA PLAN TOTAL FRACTIONS PRESCRIBED: 1
RAD ONC ARIA PLAN TOTAL FRACTIONS PRESCRIBED: 1
RAD ONC ARIA PLAN TOTAL PRESCRIBED DOSE: 130.7 CGY
RAD ONC ARIA PLAN TOTAL PRESCRIBED DOSE: 200 CGY
RAD ONC ARIA REFERENCE POINT DOSAGE GIVEN TO DATE: 0 GY
RAD ONC ARIA REFERENCE POINT ID: NORMAL

## 2023-02-27 ENCOUNTER — OFFICE VISIT (OUTPATIENT)
Dept: CARDIOLOGY | Facility: CLINIC | Age: 83
End: 2023-02-27
Payer: MEDICARE

## 2023-02-27 VITALS
WEIGHT: 161 LBS | SYSTOLIC BLOOD PRESSURE: 130 MMHG | HEIGHT: 70 IN | DIASTOLIC BLOOD PRESSURE: 86 MMHG | BODY MASS INDEX: 23.05 KG/M2 | HEART RATE: 62 BPM

## 2023-02-27 DIAGNOSIS — I48.21 PERMANENT ATRIAL FIBRILLATION: Primary | ICD-10-CM

## 2023-02-27 DIAGNOSIS — I10 ESSENTIAL HYPERTENSION: ICD-10-CM

## 2023-02-27 DIAGNOSIS — E78.5 DYSLIPIDEMIA: ICD-10-CM

## 2023-02-27 DIAGNOSIS — Z79.01 CHRONIC ANTICOAGULATION: ICD-10-CM

## 2023-02-27 PROCEDURE — 93000 ELECTROCARDIOGRAM COMPLETE: CPT | Performed by: INTERNAL MEDICINE

## 2023-02-27 PROCEDURE — 99214 OFFICE O/P EST MOD 30 MIN: CPT | Performed by: INTERNAL MEDICINE

## 2023-02-27 NOTE — PROGRESS NOTES
Reason for Visit: cardiovascular follow up.    HPI:  Elliot Palma is a 82 y.o. male is here today for 1 year follow-up.  He was recently diagnosed with prostate cancer and is treating it with radiation.  He is doing well from a cardiac standpoint.  He denies any chest pain, palpitations, dizziness, syncope, PND, or orthopnea.  His blood pressure is well controlled.  He jogs intermittently for exercise.  He also plays golf regularly.      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)   • Cerebrovascular accident (CVA) due to thrombosis of left middle cerebral artery (HCC)   • Dyslipidemia   • Permanent atrial fibrillation   • Dysphagia   • Essential hypertension   • Cavus deformity of foot   • Hx of colonic polyp   • BRBPR (bright red blood per rectum)   • Chronic anticoagulation   • Prostate cancer (HCC)   • History of radiation therapy   • Non-smoker   • Elevated PSA       Social History     Tobacco Use   • Smoking status: Never   • Smokeless tobacco: Never   • Tobacco comments:     nonuser   Vaping Use   • Vaping Use: Never used   Substance Use Topics   • Alcohol use: Yes     Alcohol/week: 30.0 standard drinks     Types: 30 Cans of beer per week     Comment: few times a week   • Drug use: No       Family History   Problem Relation Age of Onset   • Cancer Mother         breast and metastatic, cause of death   • Stroke Father         cause of death   • Cancer Sister         skin   • No  "Known Problems Son    • Colon cancer Neg Hx    • Colon polyps Neg Hx        The following portions of the patient's history were reviewed and updated as appropriate: allergies, current medications, past family history, past medical history, past social history, past surgical history and problem list.      Current Outpatient Medications:   •  atorvastatin (LIPITOR) 10 MG tablet, Take 1 tablet by mouth Every Night., Disp: 30 tablet, Rfl: 5  •  Dabigatran Etexilate Mesylate (PRADAXA PO), Take 150 mg by mouth 2 (Two) Times a Day., Disp: , Rfl:   •  dorzolamide-timolol (COSOPT) 22.3-6.8 MG/ML ophthalmic solution, Administer 1 drop to both eyes 2 (Two) Times a Day., Disp: , Rfl:   •  doxycycline (VIBRAMYCIN) 50 MG capsule, Take 50 mg by mouth 2 (Two) Times a Day. Rosacea, Disp: , Rfl:   •  latanoprost (XALATAN) 0.005 % ophthalmic solution, INSTILL ONE DROP INTO EACH EYE QHS, Disp: , Rfl: 0  •  lisinopril-hydrochlorothiazide (PRINZIDE,ZESTORETIC) 20-12.5 MG per tablet, Take 1 tablet by mouth Daily., Disp: , Rfl:   •  Multiple Vitamins-Minerals (MULTIVITAMIN ADULT PO), Take 1 tablet by mouth Daily., Disp: , Rfl:   •  HYDROcodone-acetaminophen (NORCO) 5-325 MG per tablet, Take 1 tablet by mouth Every 8 (Eight) Hours As Needed for Moderate Pain (Pain)., Disp: 6 tablet, Rfl: 0    Review of Systems   Constitutional: Negative for chills, fever and malaise/fatigue.   Cardiovascular: Negative for chest pain, dyspnea on exertion, palpitations and paroxysmal nocturnal dyspnea.   Respiratory: Negative for cough and shortness of breath.    Skin: Negative for rash.   Gastrointestinal: Negative for abdominal pain and heartburn.   Neurological: Negative for dizziness and numbness.       Objective   /86 (BP Location: Left arm, Patient Position: Sitting, Cuff Size: Adult)   Pulse 62   Ht 179 cm (70.47\")   Wt 73 kg (161 lb)   BMI 22.79 kg/m²   Constitutional:       Appearance: Well-developed and normal weight.   HENT:      Head: " Normocephalic and atraumatic.   Pulmonary:      Effort: Pulmonary effort is normal.      Breath sounds: Normal breath sounds.   Cardiovascular:      Normal rate. Irregularly irregular rhythm.      Murmurs: There is no murmur.      No gallop. No click.   Skin:     General: Skin is warm and dry.   Neurological:      Mental Status: Alert and oriented to person, place, and time.         ECG 12 Lead    Date/Time: 2/27/2023 9:08 AM  Performed by: Jomar Ingram MD  Authorized by: Jomar Ingram MD   Comparison: compared with previous ECG from 1/25/2023  Similar to previous ECG  Rhythm: atrial fibrillation  Rate: normal              ICD-10-CM ICD-9-CM   1. Permanent atrial fibrillation  I48.21 427.31   2. Essential hypertension  I10 401.9   3. Dyslipidemia  E78.5 272.4   4. Chronic anticoagulation  Z79.01 V58.61         Assessment/Plan:  1. Permanent atrial fibrillation/flutter: Good heart rate control without medications. Continue Pradaxa.      2.  Essential hypertension: Blood pressure is acceptable today.  Continue lisinopril/HCTZ.      3. Dyslipidemia:  Last available lipid panel from 9/4/2021 shows good control on atorvastatin.    4.  Chronic anticoagulation: With Pradaxa.

## 2023-03-01 ENCOUNTER — HOSPITAL ENCOUNTER (OUTPATIENT)
Dept: RADIATION ONCOLOGY | Facility: HOSPITAL | Age: 83
Setting detail: RADIATION/ONCOLOGY SERIES
End: 2023-03-01
Payer: MEDICARE

## 2023-03-16 PROCEDURE — 77338 DESIGN MLC DEVICE FOR IMRT: CPT | Performed by: RADIOLOGY

## 2023-03-16 PROCEDURE — 77301 RADIOTHERAPY DOSE PLAN IMRT: CPT | Performed by: RADIOLOGY

## 2023-03-16 PROCEDURE — 77300 RADIATION THERAPY DOSE PLAN: CPT | Performed by: RADIOLOGY

## 2023-03-30 ENCOUNTER — HOSPITAL ENCOUNTER (OUTPATIENT)
Dept: RADIATION ONCOLOGY | Facility: HOSPITAL | Age: 83
Discharge: HOME | End: 2023-03-30

## 2023-03-30 LAB
RAD ONC ARIA COURSE ID: NORMAL
RAD ONC ARIA COURSE LAST TREATMENT DATE: NORMAL
RAD ONC ARIA COURSE START DATE: NORMAL
RAD ONC ARIA COURSE TREATMENT ELAPSED DAYS: 0
RAD ONC ARIA FIRST TREATMENT DATE: NORMAL
RAD ONC ARIA PLAN FRACTIONS TREATED TO DATE: 1
RAD ONC ARIA PLAN ID: NORMAL
RAD ONC ARIA PLAN PRESCRIBED DOSE PER FRACTION: 7.25 GY
RAD ONC ARIA PLAN PRIMARY REFERENCE POINT: NORMAL
RAD ONC ARIA PLAN TOTAL FRACTIONS PRESCRIBED: 5
RAD ONC ARIA PLAN TOTAL PRESCRIBED DOSE: 3625 CGY
RAD ONC ARIA REFERENCE POINT DOSAGE GIVEN TO DATE: 7.25 GY
RAD ONC ARIA REFERENCE POINT ID: NORMAL
RAD ONC ARIA REFERENCE POINT SESSION DOSAGE GIVEN: 7.25 GY

## 2023-03-30 PROCEDURE — 77373 STRTCTC BDY RAD THER TX DLVR: CPT | Performed by: RADIOLOGY

## 2023-04-03 ENCOUNTER — HOSPITAL ENCOUNTER (OUTPATIENT)
Dept: RADIATION ONCOLOGY | Facility: HOSPITAL | Age: 83
Setting detail: RADIATION/ONCOLOGY SERIES
End: 2023-04-03
Payer: MEDICARE

## 2023-04-03 ENCOUNTER — HOSPITAL ENCOUNTER (OUTPATIENT)
Dept: RADIATION ONCOLOGY | Facility: HOSPITAL | Age: 83
Discharge: HOME | End: 2023-04-03

## 2023-04-03 LAB
RAD ONC ARIA COURSE ID: NORMAL
RAD ONC ARIA COURSE LAST TREATMENT DATE: NORMAL
RAD ONC ARIA COURSE START DATE: NORMAL
RAD ONC ARIA COURSE TREATMENT ELAPSED DAYS: 4
RAD ONC ARIA FIRST TREATMENT DATE: NORMAL
RAD ONC ARIA PLAN FRACTIONS TREATED TO DATE: 2
RAD ONC ARIA PLAN ID: NORMAL
RAD ONC ARIA PLAN PRESCRIBED DOSE PER FRACTION: 7.25 GY
RAD ONC ARIA PLAN PRIMARY REFERENCE POINT: NORMAL
RAD ONC ARIA PLAN TOTAL FRACTIONS PRESCRIBED: 5
RAD ONC ARIA PLAN TOTAL PRESCRIBED DOSE: 3625 CGY
RAD ONC ARIA REFERENCE POINT DOSAGE GIVEN TO DATE: 14.5 GY
RAD ONC ARIA REFERENCE POINT ID: NORMAL
RAD ONC ARIA REFERENCE POINT SESSION DOSAGE GIVEN: 7.25 GY

## 2023-04-03 PROCEDURE — 77373 STRTCTC BDY RAD THER TX DLVR: CPT | Performed by: RADIOLOGY

## 2023-04-05 ENCOUNTER — HOSPITAL ENCOUNTER (OUTPATIENT)
Dept: RADIATION ONCOLOGY | Facility: HOSPITAL | Age: 83
Discharge: HOME | End: 2023-04-05

## 2023-04-05 LAB
RAD ONC ARIA COURSE ID: NORMAL
RAD ONC ARIA COURSE LAST TREATMENT DATE: NORMAL
RAD ONC ARIA COURSE START DATE: NORMAL
RAD ONC ARIA COURSE TREATMENT ELAPSED DAYS: 6
RAD ONC ARIA FIRST TREATMENT DATE: NORMAL
RAD ONC ARIA PLAN FRACTIONS TREATED TO DATE: 3
RAD ONC ARIA PLAN ID: NORMAL
RAD ONC ARIA PLAN PRESCRIBED DOSE PER FRACTION: 7.25 GY
RAD ONC ARIA PLAN PRIMARY REFERENCE POINT: NORMAL
RAD ONC ARIA PLAN TOTAL FRACTIONS PRESCRIBED: 5
RAD ONC ARIA PLAN TOTAL PRESCRIBED DOSE: 3625 CGY
RAD ONC ARIA REFERENCE POINT DOSAGE GIVEN TO DATE: 21.75 GY
RAD ONC ARIA REFERENCE POINT ID: NORMAL
RAD ONC ARIA REFERENCE POINT SESSION DOSAGE GIVEN: 7.25 GY

## 2023-04-05 PROCEDURE — 77336 RADIATION PHYSICS CONSULT: CPT | Performed by: RADIOLOGY

## 2023-04-05 PROCEDURE — 77373 STRTCTC BDY RAD THER TX DLVR: CPT | Performed by: RADIOLOGY

## 2023-04-07 ENCOUNTER — HOSPITAL ENCOUNTER (OUTPATIENT)
Dept: RADIATION ONCOLOGY | Facility: HOSPITAL | Age: 83
Discharge: HOME | End: 2023-04-07

## 2023-04-07 LAB
RAD ONC ARIA COURSE ID: NORMAL
RAD ONC ARIA COURSE LAST TREATMENT DATE: NORMAL
RAD ONC ARIA COURSE START DATE: NORMAL
RAD ONC ARIA COURSE TREATMENT ELAPSED DAYS: 8
RAD ONC ARIA FIRST TREATMENT DATE: NORMAL
RAD ONC ARIA PLAN FRACTIONS TREATED TO DATE: 4
RAD ONC ARIA PLAN ID: NORMAL
RAD ONC ARIA PLAN PRESCRIBED DOSE PER FRACTION: 7.25 GY
RAD ONC ARIA PLAN PRIMARY REFERENCE POINT: NORMAL
RAD ONC ARIA PLAN TOTAL FRACTIONS PRESCRIBED: 5
RAD ONC ARIA PLAN TOTAL PRESCRIBED DOSE: 3625 CGY
RAD ONC ARIA REFERENCE POINT DOSAGE GIVEN TO DATE: 29 GY
RAD ONC ARIA REFERENCE POINT ID: NORMAL
RAD ONC ARIA REFERENCE POINT SESSION DOSAGE GIVEN: 7.25 GY

## 2023-04-07 PROCEDURE — 77373 STRTCTC BDY RAD THER TX DLVR: CPT | Performed by: RADIOLOGY

## 2023-04-10 ENCOUNTER — HOSPITAL ENCOUNTER (OUTPATIENT)
Dept: RADIATION ONCOLOGY | Facility: HOSPITAL | Age: 83
Discharge: HOME | End: 2023-04-10

## 2023-04-10 LAB
RAD ONC ARIA COURSE ID: NORMAL
RAD ONC ARIA COURSE LAST TREATMENT DATE: NORMAL
RAD ONC ARIA COURSE START DATE: NORMAL
RAD ONC ARIA COURSE TREATMENT ELAPSED DAYS: 11
RAD ONC ARIA FIRST TREATMENT DATE: NORMAL
RAD ONC ARIA PLAN FRACTIONS TREATED TO DATE: 5
RAD ONC ARIA PLAN ID: NORMAL
RAD ONC ARIA PLAN PRESCRIBED DOSE PER FRACTION: 7.25 GY
RAD ONC ARIA PLAN PRIMARY REFERENCE POINT: NORMAL
RAD ONC ARIA PLAN TOTAL FRACTIONS PRESCRIBED: 5
RAD ONC ARIA PLAN TOTAL PRESCRIBED DOSE: 3625 CGY
RAD ONC ARIA REFERENCE POINT DOSAGE GIVEN TO DATE: 36.25 GY
RAD ONC ARIA REFERENCE POINT ID: NORMAL
RAD ONC ARIA REFERENCE POINT SESSION DOSAGE GIVEN: 7.25 GY

## 2023-04-10 PROCEDURE — 77373 STRTCTC BDY RAD THER TX DLVR: CPT | Performed by: RADIOLOGY

## 2023-07-07 ENCOUNTER — HOSPITAL ENCOUNTER (OUTPATIENT)
Dept: RADIATION ONCOLOGY | Facility: HOSPITAL | Age: 83
Setting detail: RADIATION/ONCOLOGY SERIES
End: 2023-07-07
Payer: MEDICARE

## 2023-07-10 PROCEDURE — G0463 HOSPITAL OUTPT CLINIC VISIT: HCPCS | Performed by: RADIOLOGY

## 2024-03-01 ENCOUNTER — OFFICE VISIT (OUTPATIENT)
Dept: CARDIOLOGY | Facility: CLINIC | Age: 84
End: 2024-03-01
Payer: MEDICARE

## 2024-03-01 VITALS
BODY MASS INDEX: 23.05 KG/M2 | DIASTOLIC BLOOD PRESSURE: 74 MMHG | HEIGHT: 70 IN | SYSTOLIC BLOOD PRESSURE: 118 MMHG | OXYGEN SATURATION: 91 % | HEART RATE: 85 BPM | WEIGHT: 161 LBS

## 2024-03-01 DIAGNOSIS — I48.21 PERMANENT ATRIAL FIBRILLATION: Primary | ICD-10-CM

## 2024-03-01 DIAGNOSIS — I10 ESSENTIAL HYPERTENSION: ICD-10-CM

## 2024-03-01 DIAGNOSIS — E78.5 DYSLIPIDEMIA: ICD-10-CM

## 2024-03-01 DIAGNOSIS — Z79.01 CHRONIC ANTICOAGULATION: ICD-10-CM

## 2024-03-01 PROBLEM — K62.5 BRBPR (BRIGHT RED BLOOD PER RECTUM): Status: RESOLVED | Noted: 2019-08-12 | Resolved: 2024-03-01

## 2024-03-01 NOTE — PROGRESS NOTES
Reason for Visit: cardiovascular follow up.    HPI:  Elliot Palma is a 83 y.o. male is here today for 1 year follow-up.  He follows in cardiology clinic secondary to permanent atrial fibrillation/flutter.  He is doing well and has no current issues or complaints today.  He continues to run regularly, about ever other day.  He denies any chest pain, palpitations, dizziness, syncope, PND, or orthopnea.  He notices some fluctuations in his blood pressure.  It will occasionally get down to 90 mmHg systolic, but he has no symptoms when it does.  He is able to do anything he wants to do.      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    Cerebrovascular accident (CVA) due to thrombosis of left middle cerebral artery    Dyslipidemia    Permanent atrial fibrillation    Dysphagia    Essential hypertension    Cavus deformity of foot    Hx of colonic polyp    Chronic anticoagulation    Prostate cancer    History of radiation therapy    Non-smoker    Elevated PSA       Social History     Tobacco Use    Smoking status: Never    Smokeless tobacco: Never    Tobacco comments:     nonuser   Vaping Use    Vaping Use: Never used   Substance Use Topics    Alcohol use: Yes     Alcohol/week: 30.0 standard drinks of alcohol     Types: 30 Cans of beer per week     Comment: few times a week    Drug use: No       Family History   Problem  "Relation Age of Onset    Cancer Mother         breast and metastatic, cause of death    Stroke Father         cause of death    Cancer Sister         skin    No Known Problems Son     Colon cancer Neg Hx     Colon polyps Neg Hx        The following portions of the patient's history were reviewed and updated as appropriate: allergies, current medications, past family history, past medical history, past social history, past surgical history, and problem list.      Current Outpatient Medications:     atorvastatin (LIPITOR) 10 MG tablet, Take 1 tablet by mouth Every Night., Disp: 30 tablet, Rfl: 5    Dabigatran Etexilate Mesylate (PRADAXA PO), Take 150 mg by mouth 2 (Two) Times a Day., Disp: , Rfl:     dorzolamide-timolol (COSOPT) 22.3-6.8 MG/ML ophthalmic solution, Administer 1 drop to both eyes 2 (Two) Times a Day., Disp: , Rfl:     doxycycline (VIBRAMYCIN) 50 MG capsule, Take 1 capsule by mouth 2 (Two) Times a Day. Rosacea, Disp: , Rfl:     latanoprost (XALATAN) 0.005 % ophthalmic solution, INSTILL ONE DROP INTO EACH EYE QHS, Disp: , Rfl: 0    lisinopril-hydrochlorothiazide (PRINZIDE,ZESTORETIC) 20-12.5 MG per tablet, Take 1 tablet by mouth Daily., Disp: , Rfl:     Multiple Vitamins-Minerals (MULTIVITAMIN ADULT PO), Take 1 tablet by mouth Daily., Disp: , Rfl:     tamsulosin (FLOMAX) 0.4 MG capsule 24 hr capsule, Take 1 capsule by mouth Daily., Disp: 30 capsule, Rfl: 11    Review of Systems   Constitutional: Negative for chills and fever.   Cardiovascular:  Negative for chest pain, dyspnea on exertion, irregular heartbeat, palpitations, paroxysmal nocturnal dyspnea and syncope.   Respiratory:  Negative for cough and shortness of breath.    Skin:  Negative for rash.   Gastrointestinal:  Negative for abdominal pain and heartburn.   Neurological:  Negative for dizziness and numbness.       Objective   /74 (BP Location: Left arm, Patient Position: Sitting, Cuff Size: Adult)   Pulse 85   Ht 177.8 cm (70\")   Wt 73 " kg (161 lb)   SpO2 91%   BMI 23.10 kg/m²   Constitutional:       Appearance: Well-developed.   HENT:      Head: Normocephalic and atraumatic.   Pulmonary:      Effort: Pulmonary effort is normal.      Breath sounds: Normal breath sounds.   Cardiovascular:      Normal rate. Irregularly irregular rhythm.      Murmurs: There is no murmur.   Edema:     Peripheral edema absent.   Skin:     General: Skin is warm and dry.   Neurological:      Mental Status: Alert and oriented to person, place, and time.       Procedures      ICD-10-CM ICD-9-CM   1. Permanent atrial fibrillation  I48.21 427.31   2. Essential hypertension  I10 401.9   3. Dyslipidemia  E78.5 272.4   4. Chronic anticoagulation  Z79.01 V58.61         Assessment/Plan:  1.  Permanent atrial fibrillation/flutter: Normal heart rate.  He remains asymptomatic.  Continue Pradaxa.      2.  Essential hypertension: Blood pressure is within normal limits today on HCTZ and lisinopril.      3. Dyslipidemia: Good control on lipid panel from 1/25/2024.  Continue atorvastatin.     4.  Chronic anticoagulation: Continue Pradaxa.

## 2024-03-01 NOTE — LETTER
March 1, 2024     Michi Bradley MD  [address] [place] [address] [address]  [place] [address]    Patient: Elliot Palma   YOB: 1940   Date of Visit: 3/1/2024       Dear Michi Bradley MD    Elliot Palma was in my office today. Below is a copy of my note.    If you have questions, please do not hesitate to call me. I look forward to following Elliot along with you.         Sincerely,        Jomar Ingram MD        CC: No Recipients      Reason for Visit: cardiovascular follow up.    HPI:  Elliot Palma is a 83 y.o. male is here today for 1 year follow-up.  He follows in cardiology clinic secondary to permanent atrial fibrillation/flutter.  He is doing well and has no current issues or complaints today.  He continues to run regularly, about ever other day.  He denies any chest pain, palpitations, dizziness, syncope, PND, or orthopnea.  He notices some fluctuations in his blood pressure.  It will occasionally get down to 90 mmHg systolic, but he has no symptoms when it does.  He is able to do anything he wants to do.      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   • Cerebrovascular accident (CVA) due to thrombosis of left middle cerebral artery   • Dyslipidemia   • Permanent atrial fibrillation   • Dysphagia   • Essential hypertension   • Cavus deformity of foot   • Hx of colonic polyp   • Chronic  anticoagulation   • Prostate cancer   • History of radiation therapy   • Non-smoker   • Elevated PSA       Social History     Tobacco Use   • Smoking status: Never   • Smokeless tobacco: Never   • Tobacco comments:     nonuser   Vaping Use   • Vaping Use: Never used   Substance Use Topics   • Alcohol use: Yes     Alcohol/week: 30.0 standard drinks of alcohol     Types: 30 Cans of beer per week     Comment: few times a week   • Drug use: No       Family History   Problem Relation Age of Onset   • Cancer Mother         breast and metastatic, cause of death   • Stroke Father         cause of death   • Cancer Sister         skin   • No Known Problems Son    • Colon cancer Neg Hx    • Colon polyps Neg Hx        The following portions of the patient's history were reviewed and updated as appropriate: allergies, current medications, past family history, past medical history, past social history, past surgical history, and problem list.      Current Outpatient Medications:   •  atorvastatin (LIPITOR) 10 MG tablet, Take 1 tablet by mouth Every Night., Disp: 30 tablet, Rfl: 5  •  Dabigatran Etexilate Mesylate (PRADAXA PO), Take 150 mg by mouth 2 (Two) Times a Day., Disp: , Rfl:   •  dorzolamide-timolol (COSOPT) 22.3-6.8 MG/ML ophthalmic solution, Administer 1 drop to both eyes 2 (Two) Times a Day., Disp: , Rfl:   •  doxycycline (VIBRAMYCIN) 50 MG capsule, Take 1 capsule by mouth 2 (Two) Times a Day. Rosacea, Disp: , Rfl:   •  latanoprost (XALATAN) 0.005 % ophthalmic solution, INSTILL ONE DROP INTO EACH EYE QHS, Disp: , Rfl: 0  •  lisinopril-hydrochlorothiazide (PRINZIDE,ZESTORETIC) 20-12.5 MG per tablet, Take 1 tablet by mouth Daily., Disp: , Rfl:   •  Multiple Vitamins-Minerals (MULTIVITAMIN ADULT PO), Take 1 tablet by mouth Daily., Disp: , Rfl:   •  tamsulosin (FLOMAX) 0.4 MG capsule 24 hr capsule, Take 1 capsule by mouth Daily., Disp: 30 capsule, Rfl: 11    Review of Systems   Constitutional: Negative for chills and fever.  "  Cardiovascular:  Negative for chest pain, dyspnea on exertion, irregular heartbeat, palpitations, paroxysmal nocturnal dyspnea and syncope.   Respiratory:  Negative for cough and shortness of breath.    Skin:  Negative for rash.   Gastrointestinal:  Negative for abdominal pain and heartburn.   Neurological:  Negative for dizziness and numbness.       Objective  /74 (BP Location: Left arm, Patient Position: Sitting, Cuff Size: Adult)   Pulse 85   Ht 177.8 cm (70\")   Wt 73 kg (161 lb)   SpO2 91%   BMI 23.10 kg/m²   Constitutional:       Appearance: Well-developed.   HENT:      Head: Normocephalic and atraumatic.   Pulmonary:      Effort: Pulmonary effort is normal.      Breath sounds: Normal breath sounds.   Cardiovascular:      Normal rate. Irregularly irregular rhythm.      Murmurs: There is no murmur.   Edema:     Peripheral edema absent.   Skin:     General: Skin is warm and dry.   Neurological:      Mental Status: Alert and oriented to person, place, and time.       Procedures      ICD-10-CM ICD-9-CM   1. Permanent atrial fibrillation  I48.21 427.31   2. Essential hypertension  I10 401.9   3. Dyslipidemia  E78.5 272.4   4. Chronic anticoagulation  Z79.01 V58.61         Assessment/Plan:  1.  Permanent atrial fibrillation/flutter: Normal heart rate.  He remains asymptomatic.  Continue Pradaxa.      2.  Essential hypertension: Blood pressure is within normal limits today on HCTZ and lisinopril.      3. Dyslipidemia: Good control on lipid panel from 1/25/2024.  Continue atorvastatin.     4.  Chronic anticoagulation: Continue Pradaxa.  "

## 2024-07-05 NOTE — PROGRESS NOTES
[facility]  Radiation Oncology Clinic   MD Guy Worley MD  PRESTON Sewell  _______________________________________________  [facility]  Department of Radiation Oncology  [address] [place] [id number]  Office: [phone]  Fax: [phone]    DATE: 07/10/2024  PATIENT: Elliot Palma  1940                         MEDICAL RECORD #: [id number]    1. Prostate cancer    2. 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 History of radiation therapy    4. Non-smoker                                         REASON FOR VISIT:    Chief Complaint   Patient presents with    Prostate Cancer       REASON FOR FOLLOW UP VISIT  Elliot Palma is a very pleasant 84 y.o. patient that has completed radiation therapy and returns today for routine follow up exam.    History of Present Illness:  Diagnosed with Prostatic Adenocarcinoma, pre-PSA 7.2. He completed 3625 cGy in 5 fractions to the prostate on 04/10/2023.     03/04/2021 - PSA: 5.0    2022 - PSA: 7.2    10/05/2022 - Appointment with Alexi Li MD:  I discussed impllications inlcuding possible prostate cancer  will evalaute with prostate MRI  if any lesions, we will plan for MRI fusion prostate biopsy    10/22/2022 - MRI Pelvis/Prostate with and without contrast:  A lesion in the right lateral peripheral zone at the apex is at  intermediate suspicion for malignancy with an overall PI-RADS score of 3.   No extraprostatic extension.     11/28/2022 - Prostate biopsy per Alexi Li MD:  Prostate, right base lateral, needle biopsy   Benign prostatic tissue   Prostate, right base medial, needle biopsy   Benign prostatic tissue   Prostate, right mid lateral, needle biopsy   Benign prostatic tissue   Prostate, right mid medial, needle biopsy   Benign prostatic tissue   Prostate, right apex lateral, needle biopsy   Prostatic adenocarcinoma,  "[place] score 4 + 3 = 7 (70% of pattern 4), grade group 3, in 1 of 2 cores (1/2), measuring less than 1 mm in total length, involving 5% of total core tissue (see comment)   Prostate, right apex medial, needle biopsy   Benign prostatic tissue   Prostate, left apex lateral, needle biopsy   Atypical small acinar proliferation (see comment)   Prostate, left apex medial, needle biopsy   Benign prostatic tissue   Prostate, left mid lateral, needle biopsy   Benign prostatic tissue   Prostate, left mid medial, needle biopsy   Benign prostatic tissue   Prostate, left base lateral, needle biopsy   Benign prostatic tissue   Prostate, left base medial, needle biopsy   Benign prostatic tissue   Prostate, \"lesion one (MRI suspicious),\" needle biopsy   Benign prostatic tissue      12/29/2022 - Consult with :  A definitive course of fractionated radiation therapy would likely consist of a course of 7000 cGy over 28 treatment fractions. We also discussed that SBRT would consist of a course of treatment over 5 fractions, final course pending.   The patient verbalizes understanding, voices no further questions and wishes to proceed with recommended therapy with SBRT. Will refer back to urologist for seed placement and Space OAR gel placement.     03/30/2023 - 04/10/2023 - Completed radiaiton course:  Received 3625 cGy in 5 fractions to the prostate via Stereotactic Radiation Therapy - SRT.    05/10/2023 - Appointment with Alexi Li MD:   Prostate cancer  status post radiation  he is some lower urinary tract symptoms likely related to his recent radiation. I anticipate that he will have some improvement with time but we will start Flomax in hopes to expedite his improvement in urinary symptoms.  regarding his bowel complaints I recommended discussing with his radiation oncologist  he is following up with his radiation oncologists in a couple of months. I will have him check a PSA and see me back in about 6 " months     07/10/2023 - Appointment with :  PSA level today, it should be available in 24 hours  Return to Dr. Escobar in one year    07/10/2023 - PSA:1.670    01/25/2024 - PSA: 0.8              History of metastatic squamous cell carcinoma  12/23/2013 - Left cervical lymph node excision:  Metastatic squamous cell carcinoma    01/06/2014 - CT Chest:  No enlarged lymph nodes seen on this chest CT.   Coronary artery calcification.  Hepatomegaly and fatty infiltration of the liver.     01/06/2014 - CT Neck:  No enlarged nodes or evidence of lymphadenopathy of the neck.  Small amount of mucosal membrane thickening identified in the left maxillary sinus    01/07/2014 - PET Scan:   Unremarkable PET CT with no hypermetabolic lesion seen to suggest metastatic disease.    02/17/2014 - Surgery:   Left external jugular resection lymph node - no evidence of malignancy in 1 lymph node  Left Superficial parotid - no evidence of malignancy, no evidence of malignancy in 2 lymph nodes  Left level IIB lymph node dissection - no evidence of malignancy in 13 lymph nodes  Left level IIA lymph nodes - no evidence of malignancy in 6 lymph nodes  Left Level III lymph nodes - no evidence of malignancy in 18 lymph nodes  Left suboccipital scar resection - fibrous scar, foreign body giant cell reaction and no evidence of malignancy  Left retroauricular lymph node resection - fibroadipose tissue and no evidence of malignancy  Left suboccipital lymph nodes - no evidence of malignancy in 2 lymph nodes  Left deep suboccipital lymph node - fibroadipose tissue and evidence of maligancny  Left level VA lymph nodes - no evidence of malignancy in 2 lymph nodes  Left level VB lymph nodes - no evidence of malignancy in 8 lymph nodes    04/02/2014 - 05/16/2014 - Completed Radiation course:  Received 6040 cGy in 33 fractions to left neck via external beam radiation therapy.    12/13/2022 - Shaven biopsies:  SKIN, RIGHT UPPER CHEST, SHAVE BIOPSY:      SQUAMOUS CELL CARCINOMA, WELL-DIFFERENTIATED   SKIN, LEFT RADIAL FOREARM, SHAVE BIOPSY:     SQUAMOUS CELL CARCINOMA, WELL-DIFFERENTIATED   SKIN, LEFT TEMPLE, SHAVE BIOPSY:     SQUAMOUS CELL CARCINOMA, WELL-DIFFERENTIATED    02/03/2023 - Excisions:  SKIN, RIGHT UPPER CHEST, EXCISION:   RESIDUAL SQUAMOUS CELL CARCINOMA IN SITU AND SCAR FROM A PREVIOUS PROCEDURE   SKIN, LEFT RADIAL FOREARM, EXCISION:    RESIDUAL SQUAMOUS CELL CARCINOMA AND SCAR FROM A PREVIOUS PROCEDURE     03/28/2023 - Shaven biopsies:  SKIN, RIGHT FOREARM, SHAVE BIOPSY:   BASAL CELL CARCINOMA   SKIN, LEFT EAR, SHAVE BIOPSY:   CHONDRODERMATITIS NODULARIS HELICIS     10/05/2023 - Shaven biospies:  SKIN, LEFT DORSAL HAND, SHAVE BIOPSY:   SQUAMOUS CELL CARCINOMA, WELL-DIFFERENTIATED, SURFACE OF LESION   SKIN, RIGHT LOWER CUTANEOUS LIP, SHAVE BIOPSY:    BASAL CELL CARCINOMA     02/05/2024 - Shaven biopsies:  SKIN, RIGHT LATERAL LEG, SHAVE BIOPSY:     SQUAMOUS CELL CARCINOMA, WELL-DIFFERENTIATED   SKIN, LEFT DORSAL HAND, SHAVE BIOPSY:    SQUAMOUS CELL CARCINOMA IN SITU   SKIN, RIGHT ZYGOMA, SHAVE BIOPSY:    SQUAMOUS CELL CARCINOMA IN SITU         History obtained from  PATIENT and CHART    PAST MEDICAL HISTORY  Past Medical History:   Diagnosis Date    Arrhythmia     Atrial fibrillation     Body mass index (BMI) of 23.0-23.9 in adult     Cancer     basil cell carcinoma     Elevated PSA March 2022    Glaucoma     Hearing loss     Hyperlipidemia     Hypertension     Multiple actinic keratoses     Prostate cancer Nov. 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     Squamous cell carcinoma     metastatic, of left posterior cervical lymph node    Squamous cell carcinoma     of scalp and lower left extremity    Stroke 2016    difficulty swallowing foods at times    Wears glasses       PAST SURGICAL HISTORY  Past Surgical History:   Procedure Laterality Date    CERVICAL LYMPH NODE  BIOPSY/EXCISION Left     excision of left posterior cervical lymph node    COLONOSCOPY  08/04/2014    2 polyps not retrieved, internal hemorrhoids    COLONOSCOPY N/A 09/16/2019    Procedure: COLONOSCOPY WITH ANESTHESIA;  Surgeon: Natanael Miller MD;  Location: [facility] ENDOSCOPY;  Service: Gastroenterology    KNEE ARTHROSCOPY Bilateral     MOHS SURGERY Right 09/2016    Right Neck and Right Leg    MOHS SURGERY Right 2016    Ear    NECK SURGERY      ORIF ANKLE FRACTURE Left     OTHER SURGICAL HISTORY  2013    electrodesiccation and curettage of scalp    OTHER SURGICAL HISTORY  2011    electrodesiccation and curettage of left lower extremity    PAROTIDECTOMY Left     left superficial parotidectomy/left posterolateral neck dissection/resection of prior scar/ligation of left chle leak    SKIN GRAFT      righ ear     VASECTOMY  1970      FAMILY HISTORY  family history includes Cancer in his mother and sister; No Known Problems in his son; Stroke in his father.    SOCIAL HISTORY  Social History     Tobacco Use    Smoking status: Never    Smokeless tobacco: Never    Tobacco comments:     nonuser   Vaping Use    Vaping status: Never Used   Substance Use Topics    Alcohol use: Yes     Alcohol/week: 30.0 standard drinks of alcohol     Types: 30 Cans of beer per week     Comment: few times a week    Drug use: No     ALLERGIES  Patient has no known allergies.     MEDICATIONS    Current Outpatient Medications:     ALPRAZolam (XANAX) 0.5 MG tablet, Take 1 tablet by mouth Every 12 (Twelve) Hours., Disp: , Rfl:     atorvastatin (LIPITOR) 10 MG tablet, Take 1 tablet by mouth Every Night., Disp: 30 tablet, Rfl: 5    Dabigatran Etexilate Mesylate (PRADAXA PO), Take 150 mg by mouth 2 (Two) Times a Day., Disp: , Rfl:     dorzolamide-timolol (COSOPT) 22.3-6.8 MG/ML ophthalmic solution, Administer 1 drop to both eyes 2 (Two) Times a Day., Disp: , Rfl:     doxycycline (VIBRAMYCIN) 50 MG capsule, Take 1 capsule by mouth 2 (Two) Times a Day.  "Rosacea, Disp: , Rfl:     fluorouracil (EFUDEX) 5 % cream, 1 Application Daily As Needed., Disp: , Rfl:     latanoprost (XALATAN) 0.005 % ophthalmic solution, INSTILL ONE DROP INTO EACH EYE QHS, Disp: , Rfl: 0    lisinopril-hydrochlorothiazide (PRINZIDE,ZESTORETIC) 10-12.5 MG per tablet, Take 1 tablet by mouth Daily., Disp: , Rfl:     Multiple Vitamins-Minerals (MULTIVITAMIN ADULT PO), Take 1 tablet by mouth Daily., Disp: , Rfl:     tamsulosin (FLOMAX) 0.4 MG capsule 24 hr capsule, Take 1 capsule by mouth Daily., Disp: , Rfl:     lisinopril-hydrochlorothiazide (PRINZIDE,ZESTORETIC) 20-12.5 MG per tablet, Take 1 tablet by mouth Daily., Disp: , Rfl:     Current outpatient and discharge medications have been reconciled for the patient.  Reviewed by: PRESTON Mack    The following portions of the patient's history were reviewed and updated as appropriate: allergies, current medications, past family history, past medical history, past social history, past surgical history and problem list.    REVIEW OF SYSTEMS  Review of Systems   Constitutional: Negative.    HENT: Negative.     Eyes: Negative.         Glaucoma   Respiratory: Negative.     Cardiovascular: Negative.    Gastrointestinal: Negative.    Endocrine: Negative.    Genitourinary:  Positive for difficulty urinating, dysuria, frequency, hematuria and urgency.   Musculoskeletal: Negative.    Skin: Negative.    Allergic/Immunologic: Negative.    Neurological: Negative.    Hematological: Negative.    Psychiatric/Behavioral: Negative.         PHYSICAL EXAM  VITAL SIGNS:   Vitals:    07/10/24 0940   BP: 126/74   Pulse: 74   Resp: 18   SpO2: 100%   Weight: 71.1 kg (156 lb 11.2 oz)   Height: 177.8 cm (70\")   PainSc: 0-No pain       Physical Exam  Vitals reviewed.   Constitutional:       Appearance: Normal appearance.   HENT:      Head: Normocephalic.      Nose: Nose normal.   Eyes:      Pupils: Pupils are equal, round, and reactive to light.   Cardiovascular:      " Rate and Rhythm: Normal rate and regular rhythm.      Pulses: Normal pulses.      Heart sounds: Normal heart sounds.   Pulmonary:      Effort: Pulmonary effort is normal. No respiratory distress.      Breath sounds: Normal breath sounds. No wheezing.   Abdominal:      General: Bowel sounds are normal.      Palpations: There is no mass.   Genitourinary:     Comments: ELISE defrred   Musculoskeletal:         General: Normal range of motion.      Cervical back: Normal range of motion and neck supple. No tenderness.   Lymphadenopathy:      Cervical: No cervical adenopathy.   Skin:     General: Skin is warm and dry.      Capillary Refill: Capillary refill takes less than 2 seconds.   Neurological:      General: No focal deficit present.      Mental Status: He is alert and oriented to person, place, and time.      Motor: No weakness.   Psychiatric:         Mood and Affect: Mood normal.         Behavior: Behavior normal.         Performance Status: ECOG (1) Restricted in physically strenuous activity, ambulatory and able to do work of light nature    Clinical Quality Measures  -Pain Documented by Standardized Tool, FPS Elliot Palma reports a pain score of 0.  Given his pain assessment as noted, treatment options were discussed and the following options were decided upon as a follow-up plan to address the patient's pain:  no pain, no plan given .      -Advanced Care Planning Advance Care Planning  ACP discussion was held with the patient during this visit. Patient does not have an advance directive, information provided.    -Body Mass Index Screening and Follow-Up Plan BMI is within normal parameters. No other follow-up for BMI required.    -Tobacco Use: Screening and Cessation Intervention Social History    Tobacco Use      Smoking status: Never      Smokeless tobacco: Never      Tobacco comments: nonuser    ASSESSMENT AND PLAN  1. Prostate cancer    2. Metastatic squamous cell carcinoma involving lymph node with unknown  primary site    3. History of radiation therapy    4. Non-smoker      No orders of the defined types were placed in this encounter.    RECOMMENDATIONS:   Elliot Palma is status post completion of radiation therapy to the prostate, returns to our clinic today for routine follow up exam. Diagnosed with Prostatic Adenocarcinoma, pre-PSA 7.2. He completed 3625 cGy in 5 fractions to the prostate on 04/10/2023.     On exam, he is currently without symptoms or evidence for recurrent or metastatic disease. Reports mild daytime frequency but overall is doing well. Last PSA 0.8 in January. He reports a PSA was drawn in June per his PCP in June and it was 0.5. We have requested those records.     He will continue follow-up/surveillance as discussed in 1 year or sooner if needed and will continue to see the other health care providers as per their scheduling.    Patient Instructions   1) Return in 1 year    Return in about 1 year (around 7/10/2025).     Time Spent: I spent 42 minutes caring for Elliot on this date of service. This time includes time spent by me in the following activities: preparing for the visit, reviewing tests, obtaining and/or reviewing a separately obtained history, performing a medically appropriate examination and/or evaluation, counseling and educating the patient/family/caregiver, ordering medications, tests, or procedures, referring and communicating with other health care professionals, documenting information in the medical record, independently interpreting results and communicating that information with the patient/family/caregiver, and care coordination.   Rafal Eduardo, PRESTON  07/10/2024

## 2024-07-09 ENCOUNTER — HOSPITAL ENCOUNTER (OUTPATIENT)
Dept: RADIATION ONCOLOGY | Facility: HOSPITAL | Age: 84
Setting detail: RADIATION/ONCOLOGY SERIES
End: 2024-07-09
Payer: MEDICARE

## 2024-07-10 ENCOUNTER — OFFICE VISIT (OUTPATIENT)
Age: 84
End: 2024-07-10
Payer: MEDICARE

## 2024-07-10 VITALS
BODY MASS INDEX: 22.43 KG/M2 | DIASTOLIC BLOOD PRESSURE: 74 MMHG | SYSTOLIC BLOOD PRESSURE: 126 MMHG | HEIGHT: 70 IN | RESPIRATION RATE: 18 BRPM | HEART RATE: 74 BPM | WEIGHT: 156.7 LBS | OXYGEN SATURATION: 100 %

## 2024-07-10 DIAGNOSIS — C77.9 METASTATIC SQUAMOUS CELL CARCINOMA INVOLVING LYMPH NODE WITH UNKNOWN PRIMARY SITE: ICD-10-CM

## 2024-07-10 DIAGNOSIS — Z92.3 HISTORY OF RADIATION THERAPY: ICD-10-CM

## 2024-07-10 DIAGNOSIS — Z78.9 NON-SMOKER: ICD-10-CM

## 2024-07-10 DIAGNOSIS — C80.1 METASTATIC SQUAMOUS CELL CARCINOMA INVOLVING LYMPH NODE WITH UNKNOWN PRIMARY SITE: ICD-10-CM

## 2024-07-10 DIAGNOSIS — C61 PROSTATE CANCER: Primary | ICD-10-CM

## 2024-07-10 PROCEDURE — G0463 HOSPITAL OUTPT CLINIC VISIT: HCPCS | Performed by: RADIOLOGY

## 2024-07-10 RX ORDER — TAMSULOSIN HYDROCHLORIDE 0.4 MG/1
1 CAPSULE ORAL DAILY
COMMUNITY
Start: 2024-04-22

## 2024-07-10 RX ORDER — LISINOPRIL AND HYDROCHLOROTHIAZIDE 12.5; 1 MG/1; MG/1
1 TABLET ORAL DAILY
COMMUNITY

## 2024-07-10 RX ORDER — ALPRAZOLAM 0.5 MG/1
1 TABLET ORAL EVERY 12 HOURS SCHEDULED
COMMUNITY
Start: 2024-05-07

## 2024-07-10 RX ORDER — FLUOROURACIL 50 MG/G
1 CREAM TOPICAL DAILY PRN
COMMUNITY
Start: 2024-04-30

## 2025-03-07 ENCOUNTER — OFFICE VISIT (OUTPATIENT)
Dept: CARDIOLOGY | Facility: CLINIC | Age: 85
End: 2025-03-07
Payer: MEDICARE

## 2025-03-07 VITALS
HEIGHT: 70 IN | WEIGHT: 156 LBS | BODY MASS INDEX: 22.33 KG/M2 | HEART RATE: 65 BPM | DIASTOLIC BLOOD PRESSURE: 86 MMHG | SYSTOLIC BLOOD PRESSURE: 164 MMHG

## 2025-03-07 DIAGNOSIS — E78.5 DYSLIPIDEMIA: ICD-10-CM

## 2025-03-07 DIAGNOSIS — Z79.01 CHRONIC ANTICOAGULATION: ICD-10-CM

## 2025-03-07 DIAGNOSIS — I10 ESSENTIAL HYPERTENSION: ICD-10-CM

## 2025-03-07 DIAGNOSIS — I48.21 PERMANENT ATRIAL FIBRILLATION: Primary | ICD-10-CM

## 2025-03-07 NOTE — PROGRESS NOTES
Reason for Visit: cardiovascular follow up.    HPI:  Elliot Palma is a 84 y.o. male is here today for 1 year follow-up.  He follows in cardiology clinic due to permanent atrial fibrillation.  He has been doing well.  He continues to run every other day.  He denies any chest pain, palpitations, dizziness, syncope, PND, or orthopnea.  He notes his blood pressure gets low at times, particularly after a hot shower.  He notes his blood pressure is typically high when he goes to the doctor.  His heart rate is typically normal.     Previous Cardiac Testing and Procedures:  - Echo (11/09/2016) EF 55-60%, left atrium mildly dilated, normal RV size and function, mild MR and TR  - Carotid ultrasound (03/05/2018) less than 50% bilaterally     Lab data:  - Lipid panel 11/14/2018) total cholesterol 142, HDL 68, LDL 63, triglycerides 57  - Lipid panel (9/3/2021) total cholesterol 118, HDL 62, LDL 45, triglycerides 46  - BMP (1/25/2023) creatinine 0.74, potassium 4, sodium 134  - Lipid panel (1/25/2024) total cholesterol 122, HDL 69, LDL 42, triglycerides 44  - Lipid panel (10/17/2024) total cholesterol 137, HDL 71, LDL 55, triglycerides 48  - BMP (7/17/2024) creatinine 0.95, potassium 4.1, sodium 135    Patient Active Problem List   Diagnosis    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    Cerebrovascular accident (CVA) due to thrombosis of left middle cerebral artery    Dyslipidemia    Permanent atrial fibrillation    Dysphagia    Essential hypertension    Cavus deformity of foot    Hx of colonic polyp    Chronic anticoagulation    Prostate cancer    History of radiation therapy    Non-smoker    Elevated PSA       Social History     Tobacco Use    Smoking status: Never    Smokeless tobacco: Never    Tobacco comments:     nonuser   Vaping Use    Vaping status: Never Used   Substance Use Topics    Alcohol use: Yes     Alcohol/week: 18.0 standard drinks of alcohol     Types: 18 Cans of beer  per week     Comment: few times a week    Drug use: No       Family History   Problem Relation Age of Onset    Cancer Mother         breast and metastatic, cause of death    Stroke Father         cause of death    Cancer Sister         skin    No Known Problems Son     Colon cancer Neg Hx     Colon polyps Neg Hx        The following portions of the patient's history were reviewed and updated as appropriate: allergies, current medications, past family history, past medical history, past social history, past surgical history, and problem list.      Current Outpatient Medications:     ALPRAZolam (XANAX) 0.5 MG tablet, Take 1 tablet by mouth Every 12 (Twelve) Hours., Disp: , Rfl:     atorvastatin (LIPITOR) 10 MG tablet, Take 1 tablet by mouth Every Night., Disp: 30 tablet, Rfl: 5    Dabigatran Etexilate Mesylate (PRADAXA PO), Take 150 mg by mouth 2 (Two) Times a Day., Disp: , Rfl:     dorzolamide-timolol (COSOPT) 22.3-6.8 MG/ML ophthalmic solution, Administer 1 drop to both eyes 2 (Two) Times a Day., Disp: , Rfl:     fluorouracil (EFUDEX) 5 % cream, 1 Application Daily As Needed., Disp: , Rfl:     latanoprost (XALATAN) 0.005 % ophthalmic solution, INSTILL ONE DROP INTO EACH EYE QHS, Disp: , Rfl: 0    lisinopril-hydrochlorothiazide (PRINZIDE,ZESTORETIC) 10-12.5 MG per tablet, Take 1 tablet by mouth Daily., Disp: , Rfl:     Multiple Vitamins-Minerals (MULTIVITAMIN ADULT PO), Take 1 tablet by mouth Daily., Disp: , Rfl:     tamsulosin (FLOMAX) 0.4 MG capsule 24 hr capsule, Take 1 capsule by mouth Daily., Disp: , Rfl:     doxycycline (VIBRAMYCIN) 50 MG capsule, Take 1 capsule by mouth 2 (Two) Times a Day. Rosacea (Patient not taking: Reported on 3/7/2025), Disp: , Rfl:     Review of Systems   Constitutional: Negative for chills and fever.   Cardiovascular:  Negative for chest pain and paroxysmal nocturnal dyspnea.   Respiratory:  Negative for cough and shortness of breath.    Skin:  Negative for rash.   Gastrointestinal:   "Negative for abdominal pain and heartburn.   Neurological:  Negative for dizziness and numbness.       Objective   /86 (BP Location: Left arm, Patient Position: Sitting, Cuff Size: Adult)   Pulse 65   Ht 177.8 cm (70\")   Wt 70.8 kg (156 lb)   BMI 22.38 kg/m²   Constitutional:       Appearance: Well-developed.   HENT:      Head: Normocephalic and atraumatic.   Pulmonary:      Effort: Pulmonary effort is normal.      Breath sounds: Normal breath sounds.   Cardiovascular:      Bradycardia present. Irregularly irregular rhythm.   Edema:     Peripheral edema absent.   Skin:     General: Skin is warm and dry.   Neurological:      Mental Status: Alert and oriented to person, place, and time.         ECG 12 Lead    Date/Time: 3/7/2025 8:34 AM  Performed by: Jomar Ingram MD    Authorized by: Jomar Ingram MD  Comparison: compared with previous ECG from 2/27/2025  Similar to previous ECG  Rhythm: atrial fibrillation  Rate: bradycardic  Other findings: non-specific ST-T wave changes            ICD-10-CM ICD-9-CM   1. Permanent atrial fibrillation  I48.21 427.31   2. Essential hypertension  I10 401.9   3. Dyslipidemia  E78.5 272.4   4. Chronic anticoagulation  Z79.01 V58.61         Assessment/Plan:  1.  Permanent atrial fibrillation/flutter: EKG today shows a-fib with bradycardia.  No palpitations or other symptoms.  Continue Pradaxa.       2.  Essential hypertension: Blood pressure is elevated today but well controlled at home.  Continue HCTZ and lisinopril.      3. Dyslipidemia: Remains well controlled on lipid panel on 10/17/2024.  Continue atorvastatin.     4.  Chronic anticoagulation: With Pradaxa.  "

## 2025-03-07 NOTE — LETTER
March 7, 2025     Jaren Kasper MD  [address] [facility] [place] [address]    Patient: Elliot Palma   YOB: 1940   Date of Visit: 3/7/2025       Dear Jaren Kasper MD    Elliot Palma was in my office today. Below is a copy of my note.    If you have questions, please do not hesitate to call me. I look forward to following Elliot along with you.         Sincerely,        Jomar Ingram MD        CC: No Recipients      Reason for Visit: cardiovascular follow up.    HPI:  Elliot Palma is a 84 y.o. male is here today for 1 year follow-up.  He follows in cardiology clinic due to permanent atrial fibrillation.  He has been doing well.  He continues to run every other day.  He denies any chest pain, palpitations, dizziness, syncope, PND, or orthopnea.  He notes his blood pressure gets low at times, particularly after a hot shower.  He notes his blood pressure is typically high when he goes to the doctor.  His heart rate is typically normal.     Previous Cardiac Testing and Procedures:  - Echo (11/09/2016) EF 55-60%, left atrium mildly dilated, normal RV size and function, mild MR and TR  - Carotid ultrasound (03/05/2018) less than 50% bilaterally     Lab data:  - Lipid panel 11/14/2018) total cholesterol 142, HDL 68, LDL 63, triglycerides 57  - Lipid panel (9/3/2021) total cholesterol 118, HDL 62, LDL 45, triglycerides 46  - BMP (1/25/2023) creatinine 0.74, potassium 4, sodium 134  - Lipid panel (1/25/2024) total cholesterol 122, HDL 69, LDL 42, triglycerides 44  - Lipid panel (10/17/2024) total cholesterol 137, HDL 71, LDL 55, triglycerides 48  - BMP (7/17/2024) creatinine 0.95, potassium 4.1, sodium 135    Patient Active Problem List   Diagnosis   • 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   • Cerebrovascular accident (CVA) due to thrombosis of left middle cerebral artery   • Dyslipidemia   • Permanent atrial fibrillation   • Dysphagia   • Essential hypertension    • Cavus deformity of foot   • Hx of colonic polyp   • Chronic anticoagulation   • Prostate cancer   • History of radiation therapy   • Non-smoker   • Elevated PSA       Social History     Tobacco Use   • Smoking status: Never   • Smokeless tobacco: Never   • Tobacco comments:     nonuser   Vaping Use   • Vaping status: Never Used   Substance Use Topics   • Alcohol use: Yes     Alcohol/week: 18.0 standard drinks of alcohol     Types: 18 Cans of beer per week     Comment: few times a week   • Drug use: No       Family History   Problem Relation Age of Onset   • Cancer Mother         breast and metastatic, cause of death   • Stroke Father         cause of death   • Cancer Sister         skin   • No Known Problems Son    • Colon cancer Neg Hx    • Colon polyps Neg Hx        The following portions of the patient's history were reviewed and updated as appropriate: allergies, current medications, past family history, past medical history, past social history, past surgical history, and problem list.      Current Outpatient Medications:   •  ALPRAZolam (XANAX) 0.5 MG tablet, Take 1 tablet by mouth Every 12 (Twelve) Hours., Disp: , Rfl:   •  atorvastatin (LIPITOR) 10 MG tablet, Take 1 tablet by mouth Every Night., Disp: 30 tablet, Rfl: 5  •  Dabigatran Etexilate Mesylate (PRADAXA PO), Take 150 mg by mouth 2 (Two) Times a Day., Disp: , Rfl:   •  dorzolamide-timolol (COSOPT) 22.3-6.8 MG/ML ophthalmic solution, Administer 1 drop to both eyes 2 (Two) Times a Day., Disp: , Rfl:   •  fluorouracil (EFUDEX) 5 % cream, 1 Application Daily As Needed., Disp: , Rfl:   •  latanoprost (XALATAN) 0.005 % ophthalmic solution, INSTILL ONE DROP INTO EACH EYE QHS, Disp: , Rfl: 0  •  lisinopril-hydrochlorothiazide (PRINZIDE,ZESTORETIC) 10-12.5 MG per tablet, Take 1 tablet by mouth Daily., Disp: , Rfl:   •  Multiple Vitamins-Minerals (MULTIVITAMIN ADULT PO), Take 1 tablet by mouth Daily., Disp: , Rfl:   •  tamsulosin (FLOMAX) 0.4 MG capsule 24  "hr capsule, Take 1 capsule by mouth Daily., Disp: , Rfl:   •  doxycycline (VIBRAMYCIN) 50 MG capsule, Take 1 capsule by mouth 2 (Two) Times a Day. Rosacea (Patient not taking: Reported on 3/7/2025), Disp: , Rfl:     Review of Systems   Constitutional: Negative for chills and fever.   Cardiovascular:  Negative for chest pain and paroxysmal nocturnal dyspnea.   Respiratory:  Negative for cough and shortness of breath.    Skin:  Negative for rash.   Gastrointestinal:  Negative for abdominal pain and heartburn.   Neurological:  Negative for dizziness and numbness.       Objective  /86 (BP Location: Left arm, Patient Position: Sitting, Cuff Size: Adult)   Pulse 65   Ht 177.8 cm (70\")   Wt 70.8 kg (156 lb)   BMI 22.38 kg/m²   Constitutional:       Appearance: Well-developed.   HENT:      Head: Normocephalic and atraumatic.   Pulmonary:      Effort: Pulmonary effort is normal.      Breath sounds: Normal breath sounds.   Cardiovascular:      Bradycardia present. Irregularly irregular rhythm.   Edema:     Peripheral edema absent.   Skin:     General: Skin is warm and dry.   Neurological:      Mental Status: Alert and oriented to person, place, and time.         ECG 12 Lead    Date/Time: 3/7/2025 8:34 AM  Performed by: Jomar Ingram MD    Authorized by: Jomar Ingram MD  Comparison: compared with previous ECG from 2/27/2025  Similar to previous ECG  Rhythm: atrial fibrillation  Rate: bradycardic  Other findings: non-specific ST-T wave changes            ICD-10-CM ICD-9-CM   1. Permanent atrial fibrillation  I48.21 427.31   2. Essential hypertension  I10 401.9   3. Dyslipidemia  E78.5 272.4   4. Chronic anticoagulation  Z79.01 V58.61         Assessment/Plan:  1.  Permanent atrial fibrillation/flutter: EKG today shows a-fib with bradycardia.  No palpitations or other symptoms.  Continue Pradaxa.       2.  Essential hypertension: Blood pressure is elevated today but well controlled at home.  Continue HCTZ and " lisinopril.      3. Dyslipidemia: Remains well controlled on lipid panel on 10/17/2024.  Continue atorvastatin.     4.  Chronic anticoagulation: With Pradaxa.

## 2025-07-07 NOTE — PROGRESS NOTES
[facility]  Radiation Oncology Clinic   Franklin Carbajal MD, FACR  Rafal JOHNSTON  _______________________________________________  [facility]  Department of Radiation Oncology  [address] [place] [id number]  Office: [phone]  Fax: [phone]    DATE: 07/10/2025  PATIENT: Elliot Palma  1940                         MEDICAL RECORD #: [id number]    1. History of prostate cancer    2. History of radiation therapy    3. Non-smoker                                               REASON FOR VISIT:    Chief Complaint   Patient presents with    Prostate Cancer     SBRT Prostate  3625 cGy  EOT 04-       REASON FOR FOLLOW UP VISIT  Elliot Palma is a very pleasant 85 y.o. patient that has completed radiation therapy and returns today for oncologic surveillance     History of Present Illness:  Diagnosed with Prostatic Adenocarcinoma, pre-PSA 7.2. He completed 3625 cGy in 5 fractions to the prostate on 04/10/2023.     03/04/2021 - PSA: 5.0    2022 - PSA: 7.2    10/05/2022 - Appointment with Alexi Li MD:  Assessment/Plan: Elevated PSA  I discussed impllications inlcuding possible prostate cancer  will evalaute with prostate MRI  if any lesions, we will plan for MRI fusion prostate biopsy    10/22/2022 - MRI Pelvis/Prostate with and without contrast:  A lesion in the right lateral peripheral zone at the apex is at  intermediate suspicion for malignancy with an overall PI-RADS score of 3.   No extraprostatic extension.     11/28/2022 - Prostate biopsy per Alexi Li MD:  Prostate, right base lateral, needle biopsy   Benign prostatic tissue   Prostate, right base medial, needle biopsy   Benign prostatic tissue   Prostate, right mid lateral, needle biopsy   Benign prostatic tissue   Prostate, right mid medial, needle biopsy   Benign prostatic tissue   Prostate, right apex lateral, needle biopsy   Prostatic  "adenocarcinoma, Glencoe score 4 + 3 = 7 (70% of pattern 4), grade group 3, in 1 of 2 cores (1/2), measuring less than 1 mm in total length, involving 5% of total core tissue (see comment)   Prostate, right apex medial, needle biopsy   Benign prostatic tissue   Prostate, left apex lateral, needle biopsy   Atypical small acinar proliferation (see comment)   Prostate, left apex medial, needle biopsy   Benign prostatic tissue   Prostate, left mid lateral, needle biopsy   Benign prostatic tissue   Prostate, left mid medial, needle biopsy   Benign prostatic tissue   Prostate, left base lateral, needle biopsy   Benign prostatic tissue   Prostate, left base medial, needle biopsy   Benign prostatic tissue   Prostate, \"lesion one (MRI suspicious),\" needle biopsy   Benign prostatic tissue      12/29/2022 - Consult with :  A definitive course of fractionated radiation therapy would likely consist of a course of 7000 cGy over 28 treatment fractions. We also discussed that SBRT would consist of a course of treatment over 5 fractions, final course pending.   The patient verbalizes understanding, voices no further questions and wishes to proceed with recommended therapy with SBRT. Will refer back to urologist for seed placement and Space OAR gel placement.   Continue ongoing management per primary care physician and other specialists.   Plan:  plan on 5 SBRT treatments.   will refer you to urology locally for seed and gel placement  Return for simulation 7-10 days after seed and gel placement.     03/30/2023 - 04/10/2023 - Completed radiaiton course:  Received 3625 cGy in 5 fractions to the prostate via Stereotactic Radiation Therapy - SRT.    05/10/2023 - Appointment with Alexi Li MD:   Assessment/Plan:   Prostate cancer  status post radiation  he is some lower urinary tract symptoms likely related to his recent radiation. I anticipate that he will have some improvement with time but we will start Flomax " in hopes to expedite his improvement in urinary symptoms.  regarding his bowel complaints I recommended discussing with his radiation oncologist  he is following up with his radiation oncologists in a couple of months. I will have him check a PSA and see me back in about 6 months     07/10/2023 - Appointment with :  PSA level today, it should be available in 24 hours  Return to Dr. Escobar in one year    07/10/2023 - PSA:1.670    01/25/2024 - PSA: 0.8    07/10/2024 - PSA: 0.5    07/10/2024 - Appointment with :  Follow up in one year     10/17/2024 - PSA: 0.3    12/16/2024 - Appointment with Alexi Li MD:   Prostate cancer  s/p radiation  doing well   continue serial PSA   f/u in 1 year  cont floamx     06/05/2025- PSA: 0.2    ---------------------------------------------------------------------------------------------------------------------------  History of metastatic squamous cell carcinoma    12/23/2013 - Left cervical lymph node excision:  Metastatic squamous cell carcinoma    01/06/2014 - CT Chest:  No enlarged lymph nodes seen on this chest CT.   Coronary artery calcification.  Hepatomegaly and fatty infiltration of the liver.     01/06/2014 - CT Neck:  No enlarged nodes or evidence of lymphadenopathy of the neck.  Small amount of mucosal membrane thickening identified in the left maxillary sinus    01/07/2014 - PET Scan:   Unremarkable PET CT with no hypermetabolic lesion seen to suggest metastatic disease.    02/17/2014 - Surgery:   Left external jugular resection lymph node - no evidence of malignancy in 1 lymph node  Left Superficial parotid - no evidence of malignancy, no evidence of malignancy in 2 lymph nodes  Left level IIB lymph node dissection - no evidence of malignancy in 13 lymph nodes  Left level IIA lymph nodes - no evidence of malignancy in 6 lymph nodes  Left Level III lymph nodes - no evidence of malignancy in 18 lymph nodes  Left suboccipital scar resection  - fibrous scar, foreign body giant cell reaction and no evidence of malignancy  Left retroauricular lymph node resection - fibroadipose tissue and no evidence of malignancy  Left suboccipital lymph nodes - no evidence of malignancy in 2 lymph nodes  Left deep suboccipital lymph node - fibroadipose tissue and evidence of maligancny  Left level VA lymph nodes - no evidence of malignancy in 2 lymph nodes  Left level VB lymph nodes - no evidence of malignancy in 8 lymph nodes    04/02/2014 - 05/16/2014 - Completed Radiation course:  Received 6040 cGy in 33 fractions to left neck via external beam radiation therapy.    12/13/2022 - Shaven biopsies:  SKIN, RIGHT UPPER CHEST, SHAVE BIOPSY:     SQUAMOUS CELL CARCINOMA, WELL-DIFFERENTIATED   SKIN, LEFT RADIAL FOREARM, SHAVE BIOPSY:     SQUAMOUS CELL CARCINOMA, WELL-DIFFERENTIATED   SKIN, LEFT TEMPLE, SHAVE BIOPSY:     SQUAMOUS CELL CARCINOMA, WELL-DIFFERENTIATED    02/03/2023 - Excisions:  SKIN, RIGHT UPPER CHEST, EXCISION:   RESIDUAL SQUAMOUS CELL CARCINOMA IN SITU AND SCAR FROM A PREVIOUS PROCEDURE   SKIN, LEFT RADIAL FOREARM, EXCISION:    RESIDUAL SQUAMOUS CELL CARCINOMA AND SCAR FROM A PREVIOUS PROCEDURE     03/28/2023 - Shaven biopsies:  SKIN, RIGHT FOREARM, SHAVE BIOPSY:   BASAL CELL CARCINOMA   SKIN, LEFT EAR, SHAVE BIOPSY:   CHONDRODERMATITIS NODULARIS HELICIS     10/05/2023 - Shaven biospies:  SKIN, LEFT DORSAL HAND, SHAVE BIOPSY:   SQUAMOUS CELL CARCINOMA, WELL-DIFFERENTIATED, SURFACE OF LESION   SKIN, RIGHT LOWER CUTANEOUS LIP, SHAVE BIOPSY:    BASAL CELL CARCINOMA     02/05/2024 - Shaven biopsies:  SKIN, RIGHT LATERAL LEG, SHAVE BIOPSY:     SQUAMOUS CELL CARCINOMA, WELL-DIFFERENTIATED   SKIN, LEFT DORSAL HAND, SHAVE BIOPSY:    SQUAMOUS CELL CARCINOMA IN SITU   SKIN, RIGHT ZYGOMA, SHAVE BIOPSY:    SQUAMOUS CELL CARCINOMA IN SITU     03/29/2024 - SKIN, RIGHT LATERAL LEG, EXCISION:    SCAR FROM A PREVIOUS PROCEDURE     04/30/2024 - Shaven biopsies:  SKIN, LEFT UPPER  FOREHEAD, SHAVE BIOPSY:    SQUAMOUS CELL CARCINOMA, WELL-DIFFERENTIATED   SKIN, LEFT MID ARM, SHAVE BIOPSY:    SQUAMOUS CELL CARCINOMA, WELL-DIFFERENTIATED     09/17/2024 - Shaven biopsies:  SKIN, RIGHT THIGH, SHAVE BIOPSY:    SURFACE OF ATYPICAL SQUAMOUS EPITHELIAL PROLIFERATION  Note:  An underlying squamous cell carcinoma cannot be unequivocally ruled out.  A deeper biopsy is indicated to establish a definitive diagnosis. The lesion does not extend to the margins of the sections examined.  SKIN, RIGHT FOREARM, SHAVE BIOPSY:    SURFACE OF CYSTICALLY DILATED FOLLICULAR INFUNDIBULUM  Note:  These changes may be seen on the surface of a complex infundibular follicular cyst, however, the differential diagnosis also includes the surface of a cystic squamous cell carcinoma.  A deeper biopsy is indicated to establish a definitive  diagnosis.  SKIN, RIGHT UPPER ARM, SHAVE BIOPSY:    BASAL CELL CARCINOMA     09/16/2024 - Appointment with Mike Youssef MD:  Neoplasm, back  clinically favor BCC but currently asymptomatic. Photo documentation today, will continue to monitor  Actinic keratosis, face  With verbal informed consent, 2 lesions on face treated with LN2 x7 seconds today; blister care reviewed  Counseled on sun protection strategies and skin cancer warning signs  Hx multiple NMSC  NER  hotoprotection counseling provided with clothing and SPF 30+ sunscreen  RTC pending path    10/14/2024 - Excisions:  SKIN, RIGHT THIGH, EXCISION:    SCAR FROM A PREVIOUS PROCEDURE   Note:There is no evidence of a neoplasm in these sections.   SKIN, RIGHT UPPER ARM, EXCISION:    RESIDUAL BASAL CELL CARCINOMA, SUPERFICIAL   SCAR FROM A PREVIOUS PROCEDURE   Note:  The lesion does not extend to the margins of the sections examined in this multiply-sectioned specimen.     10/28/2024 - SKIN, RIGHT VENTRAL FOREARM, EXCISION:    SQUAMOUS CELL CARCINOMA, WELL-DIFFERENTIATED   Note:  The lesion does not extend to the margins of the  sections examined in this multiply-sectioned specimen.     01/28/2025 - Appointment with Mike Youssef MD:  Actinic keratosis,   With verbal informed consent, 3 lesions on right mid back, left parietal scalp, and left upper back treated with LN2 x7 seconds today; blister care reviewed  Start Efudex BID x 14 days to the arms. SER   Counseled on sun protection strategies and skin cancer warning signs   Hx multiple NMSC  NER  Photoprotection counseling provided with clothing and SPF 30+ sunscreen  RTC 4 months or sooner pending path    01/28/2025 - Shaven biopsies:  SKIN, RIGHT SHOULDER, SHAVE BIOPSY:    SQUAMOUS CELL CARCINOMA, WELL-DIFFERENTIATED   SKIN, LEFT UPPER BACK, SHAVE BIOPSY:    BASAL CELL CARCINOMA, INFILTRATIVE     History obtained from  PATIENT and CHART    PAST MEDICAL HISTORY  Past Medical History:   Diagnosis Date    Abnormal ECG     Arrhythmia     Atrial fibrillation     Body mass index (BMI) of 23.0-23.9 in adult     Cancer     basil cell carcinoma     Elevated PSA March 2022    Glaucoma     Hearing loss     Hyperlipidemia     Hypertension     Multiple actinic keratoses     Prostate cancer Nov. 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     Squamous cell carcinoma     metastatic, of left posterior cervical lymph node    Squamous cell carcinoma     of scalp and lower left extremity    Stroke 2016    difficulty swallowing foods at times    Wears glasses       PAST SURGICAL HISTORY  Past Surgical History:   Procedure Laterality Date    CERVICAL LYMPH NODE BIOPSY/EXCISION Left     excision of left posterior cervical lymph node    COLONOSCOPY  08/04/2014    2 polyps not retrieved, internal hemorrhoids    COLONOSCOPY N/A 09/16/2019    Procedure: COLONOSCOPY WITH ANESTHESIA;  Surgeon: Natanael Miller MD;  Location: [facility] ENDOSCOPY;  Service: Gastroenterology    KNEE ARTHROSCOPY Bilateral     MOHS SURGERY Right 09/2016     Right Neck and Right Leg    MOHS SURGERY Right 2016    Ear    NECK SURGERY      ORIF ANKLE FRACTURE Left     OTHER SURGICAL HISTORY  2013    electrodesiccation and curettage of scalp    OTHER SURGICAL HISTORY  2011    electrodesiccation and curettage of left lower extremity    PAROTIDECTOMY Left     left superficial parotidectomy/left posterolateral neck dissection/resection of prior scar/ligation of left chle leak    SKIN GRAFT      righ ear     VASECTOMY  1970      FAMILY HISTORY  family history includes Cancer in his mother and sister; No Known Problems in his son; Stroke in his father.    SOCIAL HISTORY  Social History     Tobacco Use    Smoking status: Never    Smokeless tobacco: Never    Tobacco comments:     nonuser   Vaping Use    Vaping status: Never Used   Substance Use Topics    Alcohol use: Yes     Alcohol/week: 18.0 standard drinks of alcohol     Types: 18 Cans of beer per week     Comment: few times a week    Drug use: No     ALLERGIES  Patient has no known allergies.     MEDICATIONS    Current Outpatient Medications:     atorvastatin (LIPITOR) 10 MG tablet, Take 1 tablet by mouth Every Night., Disp: 30 tablet, Rfl: 5    Dabigatran Etexilate Mesylate (PRADAXA PO), Take 150 mg by mouth 2 (Two) Times a Day., Disp: , Rfl:     dorzolamide-timolol (COSOPT) 22.3-6.8 MG/ML ophthalmic solution, Administer 1 drop to both eyes 2 (Two) Times a Day., Disp: , Rfl:     doxycycline (VIBRAMYCIN) 50 MG capsule, Take 1 capsule by mouth 2 (Two) Times a Day. Rosacea, Disp: , Rfl:     fluorouracil (EFUDEX) 5 % cream, 1 Application Daily As Needed., Disp: , Rfl:     glycopyrrolate (ROBINUL) 1 MG tablet, Take 1 tablet by mouth 2 (Two) Times a Day., Disp: , Rfl:     latanoprost (XALATAN) 0.005 % ophthalmic solution, INSTILL ONE DROP INTO EACH EYE QHS, Disp: , Rfl: 0    lisinopril-hydrochlorothiazide (PRINZIDE,ZESTORETIC) 10-12.5 MG per tablet, Take 1 tablet by mouth Daily., Disp: , Rfl:     Multiple Vitamins-Minerals  (MULTIVITAMIN ADULT PO), Take 1 tablet by mouth Daily., Disp: , Rfl:     tamsulosin (FLOMAX) 0.4 MG capsule 24 hr capsule, Take 1 capsule by mouth Daily., Disp: , Rfl:     ALPRAZolam (XANAX) 0.5 MG tablet, Take 1 tablet by mouth Every 12 (Twelve) Hours., Disp: , Rfl:     Current outpatient and discharge medications have been reconciled for the patient.  Reviewed by: PRESTON Mack    The following portions of the patient's history were reviewed and updated as appropriate: allergies, current medications, past family history, past medical history, past social history, past surgical history and problem list.    REVIEW OF SYSTEMS  Review of Systems   Constitutional: Negative.    HENT: Negative.     Respiratory: Negative.     Cardiovascular: Negative.    Gastrointestinal: Negative.  Negative for blood in stool.   Genitourinary:  Positive for frequency. Negative for hematuria and urgency.   Musculoskeletal: Negative.    Skin: Negative.    Neurological: Negative.    Hematological: Negative.    Psychiatric/Behavioral: Negative.     All other systems reviewed and are negative.        YELENA Questionnaire (Sexual Health Inventory for Men)  Over the past 6 months…    1) How do you rate your confidence that you could get and keep an erection? 1 - Very Low   2)  When you had erections with sexual stimulation, how often were your erections hard enough for penetration (entering your partner)? 0 - No sexual activity   3)  During sexual intercourse, how often were you able to maintain your erection after you had penetrated (entered) your partner? 0 - Did not attempt intercourse   4) During sexual intercourse, how difficult was it to maintain your erection to completion of intercourse? 0 - Did not attempt intercourse   5) When you attempted sexual intercourse, how often was it satisfactory for you? 0 - Did not attempt intercourse   Total score:  1            1-7 Severe ED    8-11 Moderate ED    12-16 Mild to Moderate ED    17-21  "Mild ED    22-25 No Signs of ED     IPSS Questionnaire (AUA-7):  Over the past month…    1)  How often have you had a sensation of not emptying your bladder completely after you finish urinating?  0 - Not at all   2)  How often have you had to urinate again less than two hours after you finished urinating? 0 - Not at all   3)  How often have you found you stopped and started again several times when you urinated?  0 - Not at all   4) How difficult have you found it to postpone urination?  1 - Less than 1 time in 5   5) How often have you had a weak urinary stream?  0 - Not at all   6) How often have you had to push or strain to begin urination?  0 - Not at all   7) How many times did you most typically get up to urinate from the time you went to bed until the time you got up in the morning?  0 - None   Total score:  0-7 mildly symptomatic    8-19 moderately symptomatic    20-35 severely symptomatic     PHYSICAL EXAM  VITAL SIGNS:   Vitals:    07/10/25 0924   BP: 134/74   Pulse: 51   Resp: 18   SpO2: 100%   Weight: 66.7 kg (147 lb)   Height: 177.8 cm (70\")   PainSc: 0-No pain       Physical Exam  Vitals reviewed.   Constitutional:       Appearance: Normal appearance.   HENT:      Head: Normocephalic.      Nose: Nose normal.   Eyes:      Pupils: Pupils are equal, round, and reactive to light.   Cardiovascular:      Rate and Rhythm: Normal rate and regular rhythm.      Pulses: Normal pulses.      Heart sounds: Normal heart sounds.   Pulmonary:      Effort: Pulmonary effort is normal. No respiratory distress.      Breath sounds: Normal breath sounds. No wheezing.   Abdominal:      General: Bowel sounds are normal.      Palpations: There is no mass.   Musculoskeletal:         General: Normal range of motion.      Cervical back: Normal range of motion and neck supple. No tenderness.   Lymphadenopathy:      Cervical: No cervical adenopathy.   Skin:     General: Skin is warm and dry.      Capillary Refill: Capillary refill " takes less than 2 seconds.   Neurological:      General: No focal deficit present.      Mental Status: He is alert and oriented to person, place, and time.      Motor: No weakness.   Psychiatric:         Mood and Affect: Mood normal.         Behavior: Behavior normal.         Performance Status: ECOG (1) Restricted in physically strenuous activity, ambulatory and able to do work of light nature    Clinical Quality Measures  - Pain Documented by Standardized Tool, FPS   Elliot Palma reports a pain score of 0.  Given his pain assessment as noted, treatment options were discussed and the following options were decided upon as a follow-up plan to address the patient's pain: continuation of current treatment plan for pain.    - Body Mass Index Screening and Follow-Up Plan  BMI is within normal parameters. No other follow-up for BMI required.    - Tobacco Use: Screening and Cessation Intervention  Social History    Tobacco Use      Smoking status: Never      Smokeless tobacco: Never      Tobacco comments: nonuser    - Advanced Care Planning Advance Care Planning  ACP discussion was held with the patient during this visit. Patient does not have an advance directive, information provided.    - PHQ-2 Depression Screening:  Little interest or pleasure in doing things? Not at all   Feeling down, depressed, or hopeless? Not at all   PHQ-2 Total Score 0     ASSESSMENT AND PLAN  1. History of prostate cancer    2. History of radiation therapy    3. Non-smoker      No orders of the defined types were placed in this encounter.    RECOMMENDATIONS: Elliot Palma is status post completion of radiation therapy to the prostate, returns to our clinic today for oncological surveillance. Diagnosed with Prostatic Adenocarcinoma, pre-PSA 7.2. He completed 3625 cGy in 5 fractions to the prostate on 04/10/2023.     On exam, he is currently without symptoms or evidence for recurrent or metastatic disease. Last PSA was 0.2. He will continue  follow-up/surveillance as discussed in 1 year or sooner if needed and will continue to see the other health care providers as per their scheduling.    Patient Instructions   1) return in 1 year  2) PSAs per PCP.    Return in about 1 year (around 7/10/2026).     Time Spent: I spent 42 minutes caring for Don on this date of service. This time includes time spent by me in the following activities: preparing for the visit, reviewing tests, obtaining and/or reviewing a separately obtained history, performing a medically appropriate examination and/or evaluation, counseling and educating the patient/family/caregiver, ordering medications, tests, or procedures, referring and communicating with other health care professionals, documenting information in the medical record, independently interpreting results and communicating that information with the patient/family/caregiver, and care coordination.   Rafal Eduardo, APRN  07/10/2025

## 2025-07-09 ENCOUNTER — HOSPITAL ENCOUNTER (OUTPATIENT)
Dept: RADIATION ONCOLOGY | Facility: HOSPITAL | Age: 85
Setting detail: RADIATION/ONCOLOGY SERIES
End: 2025-07-09
Payer: MEDICARE

## 2025-07-10 ENCOUNTER — OFFICE VISIT (OUTPATIENT)
Age: 85
End: 2025-07-10
Payer: MEDICARE

## 2025-07-10 VITALS
HEART RATE: 51 BPM | BODY MASS INDEX: 21.05 KG/M2 | SYSTOLIC BLOOD PRESSURE: 134 MMHG | DIASTOLIC BLOOD PRESSURE: 74 MMHG | RESPIRATION RATE: 18 BRPM | WEIGHT: 147 LBS | HEIGHT: 70 IN | OXYGEN SATURATION: 100 %

## 2025-07-10 DIAGNOSIS — Z78.9 NON-SMOKER: ICD-10-CM

## 2025-07-10 DIAGNOSIS — Z85.46 HISTORY OF PROSTATE CANCER: Primary | ICD-10-CM

## 2025-07-10 DIAGNOSIS — Z92.3 HISTORY OF RADIATION THERAPY: ICD-10-CM

## 2025-07-10 PROCEDURE — G0463 HOSPITAL OUTPT CLINIC VISIT: HCPCS | Performed by: RADIOLOGY

## 2025-07-10 RX ORDER — GLYCOPYRROLATE 1 MG/1
1 TABLET ORAL 2 TIMES DAILY
COMMUNITY
Start: 2025-06-17 | End: 2025-08-17

## 2025-07-18 ENCOUNTER — TELEPHONE (OUTPATIENT)
Dept: CARDIOLOGY | Facility: CLINIC | Age: 85
End: 2025-07-18

## 2025-07-22 ENCOUNTER — OFFICE VISIT (OUTPATIENT)
Dept: CARDIOLOGY | Facility: CLINIC | Age: 85
End: 2025-07-22
Payer: MEDICARE

## 2025-07-22 ENCOUNTER — HOSPITAL ENCOUNTER (OUTPATIENT)
Dept: CARDIOLOGY | Facility: HOSPITAL | Age: 85
Discharge: HOME | End: 2025-07-22
Payer: MEDICARE

## 2025-07-22 ENCOUNTER — LAB (OUTPATIENT)
Dept: LAB | Facility: HOSPITAL | Age: 85
End: 2025-07-22
Payer: MEDICARE

## 2025-07-22 VITALS
BODY MASS INDEX: 21.19 KG/M2 | DIASTOLIC BLOOD PRESSURE: 62 MMHG | HEIGHT: 70 IN | SYSTOLIC BLOOD PRESSURE: 142 MMHG | WEIGHT: 148 LBS

## 2025-07-22 VITALS
BODY MASS INDEX: 21.19 KG/M2 | OXYGEN SATURATION: 98 % | WEIGHT: 148 LBS | DIASTOLIC BLOOD PRESSURE: 62 MMHG | HEART RATE: 53 BPM | SYSTOLIC BLOOD PRESSURE: 142 MMHG | HEIGHT: 70 IN

## 2025-07-22 DIAGNOSIS — R06.09 DYSPNEA ON EXERTION: Primary | ICD-10-CM

## 2025-07-22 DIAGNOSIS — I48.21 PERMANENT ATRIAL FIBRILLATION: ICD-10-CM

## 2025-07-22 DIAGNOSIS — I10 ESSENTIAL HYPERTENSION: ICD-10-CM

## 2025-07-22 DIAGNOSIS — M79.89 LEG SWELLING: ICD-10-CM

## 2025-07-22 DIAGNOSIS — R06.09 DYSPNEA ON EXERTION: ICD-10-CM

## 2025-07-22 DIAGNOSIS — M79.89 LEG SWELLING: Primary | ICD-10-CM

## 2025-07-22 DIAGNOSIS — Z79.01 CHRONIC ANTICOAGULATION: ICD-10-CM

## 2025-07-22 DIAGNOSIS — E78.5 DYSLIPIDEMIA: ICD-10-CM

## 2025-07-22 LAB
ANION GAP SERPL CALCULATED.3IONS-SCNC: 10 MMOL/L (ref 5–15)
BUN SERPL-MCNC: 12.8 MG/DL (ref 8–23)
BUN/CREAT SERPL: 16.6 (ref 7–25)
CALCIUM SPEC-SCNC: 9.7 MG/DL (ref 8.6–10.5)
CHLORIDE SERPL-SCNC: 100 MMOL/L (ref 98–107)
CO2 SERPL-SCNC: 26 MMOL/L (ref 22–29)
CREAT SERPL-MCNC: 0.77 MG/DL (ref 0.76–1.27)
EGFRCR SERPLBLD CKD-EPI 2021: 87.2 ML/MIN/1.73
GLUCOSE SERPL-MCNC: 110 MG/DL (ref 65–99)
NT-PROBNP SERPL-MCNC: 1698 PG/ML (ref 0–1800)
POTASSIUM SERPL-SCNC: 4 MMOL/L (ref 3.5–5.2)
SODIUM SERPL-SCNC: 136 MMOL/L (ref 136–145)

## 2025-07-22 PROCEDURE — 83880 ASSAY OF NATRIURETIC PEPTIDE: CPT

## 2025-07-22 PROCEDURE — 93306 TTE W/DOPPLER COMPLETE: CPT

## 2025-07-22 PROCEDURE — 80048 BASIC METABOLIC PNL TOTAL CA: CPT

## 2025-07-22 PROCEDURE — 36415 COLL VENOUS BLD VENIPUNCTURE: CPT

## 2025-07-22 RX ORDER — POTASSIUM CHLORIDE 1500 MG/1
20 TABLET, EXTENDED RELEASE ORAL DAILY
Qty: 30 TABLET | Refills: 3 | Status: SHIPPED | OUTPATIENT
Start: 2025-07-22

## 2025-07-22 RX ORDER — FUROSEMIDE 20 MG/1
20 TABLET ORAL DAILY
Qty: 30 TABLET | Refills: 3 | Status: SHIPPED | OUTPATIENT
Start: 2025-07-22

## 2025-07-22 NOTE — PROGRESS NOTES
Subjective:     Encounter Date: 07/22/2025      Patient ID: Elliot Palma is a 85 y.o. male with permanent atrial fibrillation/flutter, chronic anticoagulation, hypertension and dyslipidemia.    Chief Complaint:  leg swelling  History of Present Illness  Patient presents today for management of paroxysmal atrial fibrillation. Patient called into office this week due to ankle swelling and slight pain in back/shoulder blades. He reports that he has had ankle swelling that he noticed this spring. He reports that they have remained swollen throughout the day. He denies any rapid weight gain. He reports that he has remained active. He reports that he plays golf and runs every other day. He reports that he runs 1-1 1/2 mile. He denies any chest pain. He denies being short of breath. He reports having some dyspnea on exertion. He denies any dizziness or near syncope. He reports that he monitors his BP daily and it has been running /60-80. He denies any orthopnea or PND. He reports that he is still sleeping well. Patient is on pradaxa and denies any bleeding issues. Patient follows with Dr Kasper as PCP    Previous Cardiac Testing and Procedures:  - Echo (11/09/2016) EF 55-60%, left atrium mildly dilated, normal RV size and function, mild MR and TR  - Carotid ultrasound (03/05/2018) less than 50% bilaterally  - Lipid panel (10/17/2024) total cholesterol 137, HDL 71, LDL 55, triglycerides 48   -Lipid panel (6/5/2025): total cholesterol 118, HDL 52, LDL 54 and triglycerides 53    The following portions of the patient's history were reviewed and updated as appropriate: allergies, current medications, past family history, past medical history, past social history, past surgical history and problem list.    No Known Allergies    Current Outpatient Medications:     atorvastatin (LIPITOR) 10 MG tablet, Take 1 tablet by mouth Every Night., Disp: 30 tablet, Rfl: 5    Dabigatran Etexilate Mesylate (PRADAXA PO), Take 150 mg  by mouth 2 (Two) Times a Day., Disp: , Rfl:     dorzolamide-timolol (COSOPT) 22.3-6.8 MG/ML ophthalmic solution, Administer 1 drop to both eyes 2 (Two) Times a Day., Disp: , Rfl:     doxycycline (VIBRAMYCIN) 50 MG capsule, Take 1 capsule by mouth 2 (Two) Times a Day. Rosacea, Disp: , Rfl:     fluorouracil (EFUDEX) 5 % cream, 1 Application Daily As Needed., Disp: , Rfl:     glycopyrrolate (ROBINUL) 1 MG tablet, Take 1 tablet by mouth 2 (Two) Times a Day., Disp: , Rfl:     latanoprost (XALATAN) 0.005 % ophthalmic solution, INSTILL ONE DROP INTO EACH EYE QHS, Disp: , Rfl: 0    lisinopril-hydrochlorothiazide (PRINZIDE,ZESTORETIC) 10-12.5 MG per tablet, Take 1 tablet by mouth Daily., Disp: , Rfl:     Multiple Vitamins-Minerals (MULTIVITAMIN ADULT PO), Take 1 tablet by mouth Daily., Disp: , Rfl:     tamsulosin (FLOMAX) 0.4 MG capsule 24 hr capsule, Take 1 capsule by mouth Daily., Disp: , Rfl:   Past Medical History:   Diagnosis Date    Abnormal ECG     Arrhythmia     Atrial fibrillation     Body mass index (BMI) of 23.0-23.9 in adult     Cancer     basil cell carcinoma     Elevated PSA March 2022    Glaucoma     Hearing loss     Hyperlipidemia     Hypertension     Multiple actinic keratoses     Prostate cancer Nov. 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     Squamous cell carcinoma     metastatic, of left posterior cervical lymph node    Squamous cell carcinoma     of scalp and lower left extremity    Stroke 2016    difficulty swallowing foods at times    Wears glasses      Social History     Socioeconomic History    Marital status:     Number of children: 1   Tobacco Use    Smoking status: Never     Passive exposure: Never    Smokeless tobacco: Never    Tobacco comments:     nonuser   Vaping Use    Vaping status: Never Used   Substance and Sexual Activity    Alcohol use: Yes     Alcohol/week: 12.0 standard drinks of alcohol     Types: 12  "Cans of beer per week     Comment: few times a week    Drug use: No    Sexual activity: Not Currently     Partners: Female     Birth control/protection: Vasectomy       Review of Systems   Constitutional: Negative for malaise/fatigue.   HENT:  Negative for nosebleeds.    Cardiovascular:  Positive for dyspnea on exertion and leg swelling. Negative for chest pain, irregular heartbeat, near-syncope, orthopnea, palpitations, paroxysmal nocturnal dyspnea and syncope.   Hematologic/Lymphatic: Bruises/bleeds easily.   Genitourinary:  Negative for hematuria.   Neurological:  Negative for dizziness and weakness.   All other systems reviewed and are negative.         Objective:     Vitals reviewed.   Constitutional:       General: Not in acute distress.     Appearance: Normal appearance. Well-developed.   Eyes:      Pupils: Pupils are equal, round, and reactive to light.   HENT:      Head: Normocephalic and atraumatic.      Nose: Nose normal.   Neck:      Vascular: No carotid bruit.   Pulmonary:      Effort: Pulmonary effort is normal. No respiratory distress.      Breath sounds: Normal breath sounds. No wheezing. No rales.   Cardiovascular:      Bradycardia present. Irregularly irregular rhythm.      Murmurs: There is a grade 2/6 systolic murmur.   Edema:     Peripheral edema present.     Ankle: bilateral 2+ edema of the ankle.     Feet: bilateral 2+ edema of the feet.  Abdominal:      General: There is no distension.      Palpations: Abdomen is soft.   Musculoskeletal: Normal range of motion.      Cervical back: Normal range of motion and neck supple. Skin:     General: Skin is warm.      Findings: No erythema or rash.   Neurological:      Mental Status: Alert and oriented to person, place, and time.   Psychiatric:         Speech: Speech normal.         Behavior: Behavior normal.         Thought Content: Thought content normal.         Judgment: Judgment normal.         /62   Pulse 53   Ht 177.8 cm (70\")   Wt 67.1 " kg (148 lb)   SpO2 98%   BMI 21.24 kg/m²       ECG 12 Lead    Date/Time: 7/22/2025 9:09 AM  Performed by: Wilton Ocasio APRN    Authorized by: Wilton Ocasio APRN  Comparison: compared with previous ECG from 3/7/2025  Similar to previous ECG  Rhythm: atrial fibrillation  Rate: bradycardic  BPM: 53    Clinical impression: abnormal EKG          Lab Review:       Results for orders placed during the hospital encounter of 11/07/16    Adult Transthoracic Echo Complete    Interpretation Summary  · Left ventricular function is normal. Estimated ejection fraction is 55-60%.  · Left atrial cavity size is mildly dilated.  · Right ventricular size and systolic function is normal.  · Mild mitral valve regurgitation is present  · Mild tricuspid valve regurgitation is present.  · There is no evidence of intracardiac mass or thrombus.  · There is no evidence of right to left shunt on bubble study.        I have personally reviewed echo, labs and past office notes prior to patients visit  Assessment:          Diagnosis Plan   1. Dyspnea on exertion  Adult Transthoracic Echo Complete W/ Cont if Necessary Per Protocol    proBNP    Basic Metabolic Panel      2. Leg swelling  Adult Transthoracic Echo Complete W/ Cont if Necessary Per Protocol    proBNP    Basic Metabolic Panel      3. Permanent atrial fibrillation  ECG 12 Lead      4. Chronic anticoagulation        5. Essential hypertension        6. Dyslipidemia               Plan:       1/2. Dyspnea on exertion/Leg swelling: Will [facility]deer echo. Sending patient for BMP, BNP today. Will call with results and consider lasix for leg swelling. Discussed leg elevation, compression and low sodium diet.     3. Permanent atrial fibrillation: EKG shows atrial fibrillation with rate of 53. Continue pradaxa.     4. Chronic anticoagulation: Patient is on Pradaxa and denies any bleeding issues.     5. Hypertension: Controlled in office. BP log shows Bps running  at home routinely     6.  Dyslipidemia: Lipid panel 6/5/2025 demonstrates well controlled cholesterol. Continue atorvastatin    I spent 35 minutes caring for Don on this date of service. This time includes time spent by me in the following activities:preparing for the visit, reviewing tests, obtaining and/or reviewing a separately obtained history, performing a medically appropriate examination and/or evaluation , counseling and educating the patient/family/caregiver, ordering medications, tests, or procedures, and documenting information in the medical record     I spent 2 minutes on the separately reported service of EKG. This time is not included in the time used to support the E/M service also reported today.    Patient is to follow up in 6 weeks or sooner if needed

## 2025-07-23 LAB
AORTIC DIMENSIONLESS INDEX: 0.6 (DI)
ASCENDING AORTA: 2.8 CM
AV MEAN PRESS GRAD SYS DOP V1V2: 3.9 MMHG
AV VMAX SYS DOP: 133.1 CM/SEC
BH CV ECHO MEAS - AO MAX PG: 7.1 MMHG
BH CV ECHO MEAS - AO ROOT DIAM: 3.4 CM
BH CV ECHO MEAS - AO V2 VTI: 31.6 CM
BH CV ECHO MEAS - AVA(I,D): 1.79 CM2
BH CV ECHO MEAS - EDV(CUBED): 69.7 ML
BH CV ECHO MEAS - EDV(MOD-SP2): 58.9 ML
BH CV ECHO MEAS - EDV(MOD-SP4): 54.3 ML
BH CV ECHO MEAS - EF(MOD-SP2): 70.8 %
BH CV ECHO MEAS - EF(MOD-SP4): 53.9 %
BH CV ECHO MEAS - ESV(CUBED): 18.1 ML
BH CV ECHO MEAS - ESV(MOD-SP2): 17.2 ML
BH CV ECHO MEAS - ESV(MOD-SP4): 25 ML
BH CV ECHO MEAS - FS: 36.2 %
BH CV ECHO MEAS - IVS/LVPW: 0.9 CM
BH CV ECHO MEAS - IVSD: 0.87 CM
BH CV ECHO MEAS - LA DIMENSION: 3.9 CM
BH CV ECHO MEAS - LAT PEAK E' VEL: 10.4 CM/SEC
BH CV ECHO MEAS - LV DIASTOLIC VOL/BSA (35-75): 29.6 CM2
BH CV ECHO MEAS - LV MASS(C)D: 118 GRAMS
BH CV ECHO MEAS - LV MAX PG: 2.46 MMHG
BH CV ECHO MEAS - LV MEAN PG: 1.41 MMHG
BH CV ECHO MEAS - LV SYSTOLIC VOL/BSA (12-30): 13.6 CM2
BH CV ECHO MEAS - LV V1 MAX: 78.5 CM/SEC
BH CV ECHO MEAS - LV V1 VTI: 18.8 CM
BH CV ECHO MEAS - LVIDD: 4.1 CM
BH CV ECHO MEAS - LVIDS: 2.6 CM
BH CV ECHO MEAS - LVOT AREA: 3 CM2
BH CV ECHO MEAS - LVOT DIAM: 1.96 CM
BH CV ECHO MEAS - LVPWD: 0.97 CM
BH CV ECHO MEAS - MED PEAK E' VEL: 9.4 CM/SEC
BH CV ECHO MEAS - MR MAX PG: 86.8 MMHG
BH CV ECHO MEAS - MR MAX VEL: 465.9 CM/SEC
BH CV ECHO MEAS - MV A MAX VEL: 37.3 CM/SEC
BH CV ECHO MEAS - MV DEC SLOPE: 681.5 CM/SEC2
BH CV ECHO MEAS - MV DEC TIME: 0.17 SEC
BH CV ECHO MEAS - MV E MAX VEL: 113.3 CM/SEC
BH CV ECHO MEAS - MV E/A: 3
BH CV ECHO MEAS - PA V2 MAX: 158.4 CM/SEC
BH CV ECHO MEAS - PI END-D VEL: 75.1 CM/SEC
BH CV ECHO MEAS - SV(LVOT): 56.7 ML
BH CV ECHO MEAS - SV(MOD-SP2): 41.7 ML
BH CV ECHO MEAS - SV(MOD-SP4): 29.3 ML
BH CV ECHO MEAS - SVI(LVOT): 30.9 ML/M2
BH CV ECHO MEAS - SVI(MOD-SP2): 22.7 ML/M2
BH CV ECHO MEAS - SVI(MOD-SP4): 16 ML/M2
BH CV ECHO MEAS - TAPSE (>1.6): 1.65 CM
BH CV ECHO MEAS - TR MAX PG: 37.3 MMHG
BH CV ECHO MEAS - TR MAX VEL: 305.4 CM/SEC
BH CV ECHO MEASUREMENTS AVERAGE E/E' RATIO: 11.44
BH CV XLRA - RV BASE: 2.8 CM
BH CV XLRA - RV LENGTH: 7.2 CM
BH CV XLRA - RV MID: 1.67 CM
LEFT ATRIUM VOLUME INDEX: 38 ML/M2
LEFT ATRIUM VOLUME: 70 ML
LV EF BIPLANE MOD: 63 %

## 2025-08-26 ENCOUNTER — OFFICE VISIT (OUTPATIENT)
Dept: CARDIOLOGY | Facility: CLINIC | Age: 85
End: 2025-08-26
Payer: MEDICARE

## 2025-08-26 ENCOUNTER — LAB (OUTPATIENT)
Dept: LAB | Facility: HOSPITAL | Age: 85
End: 2025-08-26
Payer: MEDICARE

## 2025-08-26 VITALS
DIASTOLIC BLOOD PRESSURE: 82 MMHG | OXYGEN SATURATION: 100 % | HEART RATE: 50 BPM | WEIGHT: 145 LBS | HEIGHT: 72 IN | BODY MASS INDEX: 19.64 KG/M2 | SYSTOLIC BLOOD PRESSURE: 120 MMHG

## 2025-08-26 DIAGNOSIS — I50.32 CHRONIC DIASTOLIC (CONGESTIVE) HEART FAILURE: ICD-10-CM

## 2025-08-26 DIAGNOSIS — I48.21 PERMANENT ATRIAL FIBRILLATION: ICD-10-CM

## 2025-08-26 DIAGNOSIS — Z79.01 CHRONIC ANTICOAGULATION: ICD-10-CM

## 2025-08-26 DIAGNOSIS — I10 ESSENTIAL HYPERTENSION: ICD-10-CM

## 2025-08-26 DIAGNOSIS — E78.5 DYSLIPIDEMIA: ICD-10-CM

## 2025-08-26 DIAGNOSIS — I50.32 CHRONIC DIASTOLIC (CONGESTIVE) HEART FAILURE: Primary | ICD-10-CM

## 2025-08-26 LAB
ANION GAP SERPL CALCULATED.3IONS-SCNC: 10 MMOL/L (ref 5–15)
BUN SERPL-MCNC: 20.2 MG/DL (ref 8–23)
BUN/CREAT SERPL: 25.9 (ref 7–25)
CALCIUM SPEC-SCNC: 9.8 MG/DL (ref 8.6–10.5)
CHLORIDE SERPL-SCNC: 101 MMOL/L (ref 98–107)
CO2 SERPL-SCNC: 26 MMOL/L (ref 22–29)
CREAT SERPL-MCNC: 0.78 MG/DL (ref 0.76–1.27)
EGFRCR SERPLBLD CKD-EPI 2021: 86.9 ML/MIN/1.73
GLUCOSE SERPL-MCNC: 112 MG/DL (ref 65–99)
POTASSIUM SERPL-SCNC: 4.9 MMOL/L (ref 3.5–5.2)
SODIUM SERPL-SCNC: 137 MMOL/L (ref 136–145)

## 2025-08-26 PROCEDURE — 80048 BASIC METABOLIC PNL TOTAL CA: CPT

## 2025-08-26 PROCEDURE — 36415 COLL VENOUS BLD VENIPUNCTURE: CPT

## 2025-08-27 RX ORDER — SPIRONOLACTONE 25 MG/1
12.5 TABLET ORAL DAILY
Qty: 30 TABLET | Refills: 5 | Status: SHIPPED | OUTPATIENT
Start: 2025-08-27

## 2025-08-27 RX ORDER — SPIRONOLACTONE 25 MG/1
12.5 TABLET ORAL DAILY
COMMUNITY
End: 2025-08-27 | Stop reason: SDUPTHER

## (undated) DEVICE — CUFF,BP,DISP,1 TUBE,ADULT,HP: Brand: MEDLINE

## (undated) DEVICE — MASK,OXYGEN,MED CONC,ADLT,7' TUB, UC: Brand: PENDING

## (undated) DEVICE — TOWEL,OR,DSP,ST,BLUE,STD,4/PK,20PK/CS: Brand: MEDLINE

## (undated) DEVICE — Device: Brand: DEFENDO AIR/WATER/SUCTION AND BIOPSY VALVE

## (undated) DEVICE — NDL HYPO SFTY 22G 11/2IN

## (undated) DEVICE — GOWN,NON-REINFORCED,SIRUS,SET IN SLV,XXL: Brand: MEDLINE

## (undated) DEVICE — BAPTIST TURNOVER KIT: Brand: MEDLINE INDUSTRIES, INC.

## (undated) DEVICE — SENSR O2 OXIMAX FNGR A/ 18IN NONSTR

## (undated) DEVICE — GLV SURG BIOGEL M LTX PF 8

## (undated) DEVICE — NEEDLE, QUINCKE 22GX3.5": Brand: MEDLINE INDUSTRIES, INC.

## (undated) DEVICE — VAGINAL PREP TRAY: Brand: MEDLINE INDUSTRIES, INC.

## (undated) DEVICE — THE CHANNEL CLEANING BRUSH IS A NYLON FLEXI BRUSH ATTACHED TO A FLEXIBLE PLASTIC SHEATH DESIGNED TO SAFELY REMOVE DEBRIS FROM FLEXIBLE ENDOSCOPES.

## (undated) DEVICE — YANKAUER,BULB TIP WITH VENT: Brand: ARGYLE

## (undated) DEVICE — ENDOGATOR AUXILIARY WATER JET CONNECTOR: Brand: ENDOGATOR

## (undated) DEVICE — TBG SMPL FLTR LINE NASL 02/C02 A/ BX/100